# Patient Record
Sex: FEMALE | Race: ASIAN | NOT HISPANIC OR LATINO | Employment: FULL TIME | URBAN - METROPOLITAN AREA
[De-identification: names, ages, dates, MRNs, and addresses within clinical notes are randomized per-mention and may not be internally consistent; named-entity substitution may affect disease eponyms.]

---

## 2017-01-23 ENCOUNTER — GYNECOLOGY VISIT (OUTPATIENT)
Dept: OBGYN | Facility: MEDICAL CENTER | Age: 47
End: 2017-01-23
Payer: COMMERCIAL

## 2017-01-23 ENCOUNTER — HOSPITAL ENCOUNTER (OUTPATIENT)
Facility: MEDICAL CENTER | Age: 47
End: 2017-01-23
Attending: OBSTETRICS & GYNECOLOGY
Payer: COMMERCIAL

## 2017-01-23 VITALS
BODY MASS INDEX: 26.4 KG/M2 | DIASTOLIC BLOOD PRESSURE: 88 MMHG | HEIGHT: 63 IN | SYSTOLIC BLOOD PRESSURE: 142 MMHG | WEIGHT: 149 LBS

## 2017-01-23 DIAGNOSIS — N93.9 ABNORMAL UTERINE BLEEDING (AUB): ICD-10-CM

## 2017-01-23 DIAGNOSIS — D25.1 INTRAMURAL LEIOMYOMA OF UTERUS: ICD-10-CM

## 2017-01-23 LAB — PATHOLOGY CONSULT NOTE: NORMAL

## 2017-01-23 PROCEDURE — 88305 TISSUE EXAM BY PATHOLOGIST: CPT

## 2017-01-23 PROCEDURE — 58100 BIOPSY OF UTERUS LINING: CPT | Performed by: OBSTETRICS & GYNECOLOGY

## 2017-01-23 NOTE — MR AVS SNAPSHOT
"        Ines Soliz   2017 4:00 PM   Gynecology Visit   MRN: [id number]    Department:  [facility]   Dept Phone:  [phone]    Description:  Female : 1970   Provider:  Salena Eisenberg M.D.           Reason for Visit     Procedure Endometrial bipopsy       Allergies as of 2017     No Known Allergies      You were diagnosed with     Abnormal uterine bleeding (AUB)   [[id number]]         Vital Signs     Blood Pressure Height Weight Body Mass Index Last Menstrual Period Smoking Status    142/88 mmHg 1.588 m (5' 2.52\") 67.586 kg (149 lb) 26.80 kg/m2 2017 Never Smoker       Basic Information     Date Of Birth Sex Race Ethnicity Preferred Language    1970 Female  Non- English      Your appointments     2017  9:10 AM   MA SCRN10 with [facility] MG 3   [facility] BREAST [facility] ([address])    [address] [place] [address] 103  [facility] [id number] [phone]           No deodorant, powder, perfume or lotion under the arm or breast area.              Problem List              ICD-10-CM Priority Class Noted - Resolved    Family history of ischemic heart disease Z82.49   Unknown - Present    History of thyroidectomy, subtotal E89.0   Unknown - Present    Uterine fibroid D25.9   2011 - Present    History of iron deficiency anemia Z86.2   2011 - Present    Essential hypertension I10   2012 - Present    Choledocholithiasis with obstruction K80.51   2014 - Present    Hypokalemia E87.6   2016 - Present      Health Maintenance        Date Due Completion Dates    PAP SMEAR 2016 (Prv Comp), 2012    Override on 2013: Previously completed (negative)    MAMMOGRAM 2016, 2014, 2014 (Prv Comp), 2011    Override on 2014: Previously completed (category 1, [place])    IMM DTaP/Tdap/Td Vaccine (2 - Td) 2025            Current Immunizations     Influenza TIV (IM) " 10/13/2014, 9/27/2013, 10/5/2011    Influenza Vaccine Quad Inj (Pf) 10/3/2016  8:15 AM, 10/5/2015  9:25 AM, 10/13/2014    Tdap Vaccine 7/31/2015    Tuberculin Skin Test 3/7/2014  1:40 PM, 2/28/2014  1:45 PM      Below and/or attached are the medications your provider expects you to take. Review all of your home medications and newly ordered medications with your provider and/or pharmacist. Follow medication instructions as directed by your provider and/or pharmacist. Please keep your medication list with you and share with your provider. Update the information when medications are discontinued, doses are changed, or new medications (including over-the-counter products) are added; and carry medication information at all times in the event of emergency situations     Allergies:  No Known Allergies          Medications  Valid as of: January 23, 2017 -  4:38 PM    Generic Name Brand Name Tablet Size Instructions for use    AmLODIPine Besylate (Tab) NORVASC 5 MG Take 1 Tab by mouth every day.        Losartan Potassium (Tab) COZAAR 100 MG Take 1 Tab by mouth every day.        .                 Medicines prescribed today were sent to:     [id number] - [place], [place] - [address]    [address] [place] [address]    Phone: [phone] Fax: [phone]    Open 24 Hours?: No      Medication refill instructions:       If your prescription bottle indicates you have medication refills left, it is not necessary to call your provider’s office. Please contact your pharmacy and they will refill your medication.    If your prescription bottle indicates you do not have any refills left, you may request refills at any time through one of the following ways: The online Bfly system (except Urgent Care), by calling your provider’s office, or by asking your pharmacy to contact your provider’s office with a refill request. Medication refills are processed only during regular business hours and may not be available until the  next business day. Your provider may request additional information or to have a follow-up visit with you prior to refilling your medication.   *Please Note: Medication refills are assigned a new Rx number when refilled electronically. Your pharmacy may indicate that no refills were authorized even though a new prescription for the same medication is available at the pharmacy. Please request the medicine by name with the pharmacy before contacting your provider for a refill.        Your To Do List     Future Labs/Procedures Complete By Expires    PATHOLOGY SPECIMEN  As directed 1/24/2018         Finexkap Access Code: [email or web address]  Expires: 2/22/2017  3:45 PM    Finexkap  A secure, online tool to manage your health information     [employer]’s Finexkap® is a secure, online tool that connects you to your personalized health information from the privacy of your home -- day or night - making it very easy for you to manage your healthcare. Once the activation process is completed, you can even access your medical information using the Finexkap jose, which is available for free in the Apple Jose store or Google Play store.     Finexkap provides the following levels of access (as shown below):   My Chart Features   [facility] Doctor [facility]  Urgent  Care [facility]  Primary Care  Doctor   Email your healthcare team securely and privately 24/7 X X X    Manage appointments: schedule your next appointment; view details of past/upcoming appointments X      Request prescription refills. X      View recent personal medical records, including lab and immunizations X X X X   View health record, including health history, allergies, medications X X X X   Read reports about your outpatient visits, procedures, consult and ER notes X X X X   See your discharge summary, which is a recap of your hospital and/or ER visit that includes your diagnosis, lab results, and care plan. X X       How to register for  MyChart:  1. Go to  [email or web address].  2. Click on the Sign Up Now box, which takes you to the New Member Sign Up page. You will need to provide the following information:  a. Enter your [employer] Access Code exactly as it appears at the top of this page. (You will not need to use this code after you’ve completed the sign-up process. If you do not sign up before the expiration date, you must request a new code.)   b. Enter your date of birth.   c. Enter your home email address.   d. Click Submit, and follow the next screen’s instructions.  3. Create a [employer] ID. This will be your [employer] login ID and cannot be changed, so think of one that is secure and easy to remember.  4. Create a [employer] password. You can change your password at any time.  5. Enter your Password Reset Question and Answer. This can be used at a later time if you forget your password.   6. Enter your e-mail address. This allows you to receive e-mail notifications when new information is available in [employer]  7. Click Sign Up. You can now view your health information.    For assistance activating your [employer] account, call [phone]

## 2017-01-24 NOTE — PROGRESS NOTES
"Chief Complaint   Patient presents with   • Procedure     Endometrial bipopsy      History of present illness:   46 y.o. With history of fibroid uterus and AUB presents for endometrial biopsy  Still with on and off vaginal bleeding, most of the times heavy with blood clots  (+) crampy pelvic pain    Review of systems:  Pertinent positives documented in HPI and all other systems reviewed & are negative    All PMH, PSH, allergies, social history and FH reviewed and updated today:  Past Medical History   Diagnosis Date   • Elevated blood pressure complicating pregnancy, antepartum 2002   • Family history of diabetes mellitus    • Family history of ischemic heart disease    • History of thyroidectomy, subtotal    • Hypertension 4/2012   • Fibroid uterus      relatively stable   • Choledocholithiasis with obstruction 6/2014       Past Surgical History   Procedure Laterality Date   • Thyroidectomy  1997   • Ercp in or  6/15/2014     Performed by Raymon Mena M.D. at SURGERY [facility]   • Genia by laparoscopy  6/16/2014     Performed by Raghu Mata M.D. at SURGERY SAME DAY [facility] ORS   • Cholecystectomy  6/16/2014     Performed by Raghu Mata M.D. at SURGERY SAME DAY [facility] ORS       Allergies: No Known Allergies    Social History     Social History   • Marital Status:      Spouse Name: N/A   • Number of Children: N/A   • Years of Education: N/A     Occupational History   • Not on file.     Social History Main Topics   • Smoking status: Never Smoker    • Smokeless tobacco: Never Used   • Alcohol Use: No   • Drug Use: No   • Sexual Activity: Yes     Other Topics Concern   • Not on file     Social History Narrative       Family History   Problem Relation Age of Onset   • Diabetes Mother    • Heart Disease Father 70     heart attack   • Hypertension Father        Physical exam:  Blood pressure 142/88, height 1.588 m (5' 2.52\"), weight 67.586 kg (149 lb), last menstrual period " 01/19/2017.    General:appears stated age, is in no apparent distress, is well developed and well nourished  Head: normocephalic, non-tender  Abdomen: Bowel sounds positive, nondistended, soft, nontender x4, no rebound or guarding. No organomegaly. No masses.  Female GYN: cervix - closed  Uterus asymmetrically enlarged to 16-18 weeks size, mobile  Skin: No rashes, or ulcers or lesions seen  Psychiatric: Patient shows appropriate affect, is alert and oriented x3, intact judgment and insight.  1. Abnormal uterine bleeding (AUB)  Consent for Surgery / Procedure    POCT Pregnancy    PATHOLOGY SPECIMEN    REFERRAL TO GYNECOLOGY   2. Intramural leiomyoma of uterus  REFERRAL TO GYNECOLOGY   3. Negative UPT  4. Note: Procedure EMB  1. Bimanual exam done. See above  2. Sterile speculum placed with good visualization of the cervix. Cervix showed no lesions   3. Cervix cleansed with betadine x 3.  4. Anterior lip of cervix grasped with single-tooth tenaculum.  5. Uterus sounded to 13 cm  6. Pipelle curet  inserted gently in a normal usual fashion. Passed 2x.   7. Adequate endometrial tissue obtained and sent to pathology.  8. Intruments removed.  9. Bleeding from tenaculum controlled with silver nitrate.  10. Patient tolerated procedure well. No complications encountered.    Await pathology  Would like to have ANGELITO; other options discussed  - but in June or July. Her daughter's vacation from school  Referral placed

## 2017-01-30 ENCOUNTER — TELEPHONE (OUTPATIENT)
Dept: OBGYN | Facility: CLINIC | Age: 47
End: 2017-01-30

## 2017-01-30 NOTE — TELEPHONE ENCOUNTER
----- Message from Salena Eisenberg M.D. sent at 1/27/2017 12:36 PM PST -----  May inform pt of benign results. Gyn appt for POC

## 2017-02-17 ENCOUNTER — HOSPITAL ENCOUNTER (OUTPATIENT)
Dept: RADIOLOGY | Facility: MEDICAL CENTER | Age: 47
End: 2017-02-17
Attending: FAMILY MEDICINE
Payer: COMMERCIAL

## 2017-02-17 DIAGNOSIS — Z13.9 SCREENING: ICD-10-CM

## 2017-02-17 PROCEDURE — 77063 BREAST TOMOSYNTHESIS BI: CPT

## 2017-03-06 ENCOUNTER — OCCUPATIONAL MEDICINE (OUTPATIENT)
Dept: OCCUPATIONAL MEDICINE | Facility: CLINIC | Age: 47
End: 2017-03-06
Payer: COMMERCIAL

## 2017-03-06 VITALS
RESPIRATION RATE: 16 BRPM | TEMPERATURE: 98.2 F | SYSTOLIC BLOOD PRESSURE: 140 MMHG | DIASTOLIC BLOOD PRESSURE: 90 MMHG | OXYGEN SATURATION: 97 % | WEIGHT: 149 LBS | HEART RATE: 87 BPM | HEIGHT: 63 IN | BODY MASS INDEX: 26.4 KG/M2

## 2017-03-06 DIAGNOSIS — S80.02XA CONTUSION OF KNEE, LEFT: ICD-10-CM

## 2017-03-06 DIAGNOSIS — Z02.1 PRE-EMPLOYMENT DRUG SCREENING: ICD-10-CM

## 2017-03-06 LAB
AMP AMPHETAMINE: NORMAL
BAR BARBITURATES: NORMAL
BREATH ALCOHOL COMMENT: NORMAL
BZO BENZODIAZEPINES: NORMAL
COC COCAINE: NORMAL
INT CON NEG: NORMAL
INT CON POS: NORMAL
MDMA ECSTASY: NORMAL
MET METHAMPHETAMINES: NORMAL
MTD METHADONE: NORMAL
OPI OPIATES: NORMAL
OXY OXYCODONE: NORMAL
PCP PHENCYCLIDINE: NORMAL
POC BREATHALIZER: 0 PERCENT (ref 0–0.01)
POC URINE DRUG SCREEN OCDRS: NEGATIVE
THC: NORMAL

## 2017-03-06 PROCEDURE — 99201 PR OFFICE/OUTPT VISIT,NEW,LEVL I: CPT | Performed by: PREVENTIVE MEDICINE

## 2017-03-06 PROCEDURE — 80305 DRUG TEST PRSMV DIR OPT OBS: CPT | Performed by: PREVENTIVE MEDICINE

## 2017-03-06 PROCEDURE — 82075 ASSAY OF BREATH ETHANOL: CPT | Performed by: PREVENTIVE MEDICINE

## 2017-03-06 NOTE — PROGRESS NOTES
"Subjective:      Ines Soliz is a 46 y.o. female who presents with Other      DOI 3/6/2017. Mechanism of injury-twisted and fell on left knee. 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 She reports that while mopping she fell directly on the left knee. She has minimal pain complaints, but does note tenderness on the medial patellar area.     Other        ROS       Objective:     /90 mmHg  Pulse 87  Temp(Src) 36.8 °C (98.2 °F)  Resp 16  Ht 1.588 m (5' 2.5\")  Wt 67.586 kg (149 lb)  BMI 26.80 kg/m2  SpO2 97%     Physical Exam    Appearance: Well-developed, well-nourished.   Mental Status: Pleasant. Cooperative. Appropriate. .   Musculoskeletal: Left knee exam shows mild tenderness medial patella. Minimal crepitus. No effusion.       Assessment/Plan:     1. Contusion of knee, left  OTC ibuprofen  Ice  Regular work  Recheck in 4 days or sooner if needed        "

## 2017-03-06 NOTE — Clinical Note
"EMPLOYEE’S CLAIM FOR COMPENSATION/ REPORT OF INITIAL TREATMENT  FORM C-4    EMPLOYEE’S CLAIM - PROVIDE ALL INFORMATION REQUESTED   First Name  Ines Last Name  Martínez Birthdate                    1970                Sex  female Claim Number   Home Address  [address] Age  46 y.o. Height  1.588 m (5' 2.5\") Weight  67.586 kg (149 lb) [facility]     [facility] Zip  [address] Telephone  [phone] (home)    Mailing Address  [address] [address] [facility] Zip  [address] Primary Language Spoken  English    Insurer  *** Third Party   Workers Choice***   Employee's Occupation (Job Title) When Injury or Occupational Disease Occurred   ***   Employer's Name  [employer] *** Telephone  [phone] ***   Employer Address  [address] [place] [address] *** Located within [facility] *** [facility] *** Zip  [address] ***   Date of Injury  3/6/2017               Hour of Injury  12:30 PM Date Employer Notified  3/6/2017 Last Day of Work after Injury or Occupational Disease  3/6/2017 Supervisor to Whom Injury Reported  Mr Joshua Moreno   Address or Location of Accident (if applicable)  [[address]]   What were you doing at the time of accident? (if applicable)  Mopping the floor, I step on the fork plastic & slip    How did this injury or occupational disease occur? (Be specific an answer in detail. Use additional sheet if necessary)  Knee left pain/ red  I was mopping the floor when I step on plastic fork & I slip on the slip   If you believe that you have an occupational disease, when did you first have knowledge of the disability and it relationship to your employment?  na Witnesses to the Accident  na      Nature of Injury or Occupational Disease  Strain  Part(s) of Body Injured or Affected  Knee (L), ,     I certify that the above is true and correct to the best of my knowledge and that I have provided this information in order to obtain the " benefits of [place]).  I hereby authorize any physician, chiropractor, surgeon, practitioner, or other person, any hospital, including [facility] or [facility] hospital, any medical service organization, any insurance company, or other institution or organization to release to each other, any medical or other information, including benefits paid or payable, pertinent to this injury or disease, except information relative to diagnosis, treatment and/or counseling for AIDS, psychological conditions, alcohol or controlled substances, for which I must give specific authorization.  A Photostat of this authorization shall be as valid as the original.     Date   Place   Employee’s Signature   THIS REPORT MUST BE COMPLETED AND MAILED WITHIN 3 WORKING DAYS OF TREATMENT   Place  [facility]  [facility]   Date  3/6/2017 Diagnosis  No diagnosis found. Is there evidence the injured employee was under the influence of alcohol and/or another controlled substance at the time of accident?   Hour  2:33 PM Description of Injury or Disease  There were no encounter diagnoses. No   Treatment  Left knee contusion-OTC NSAIDs, ice  Have you advised the patient to remain off work five days or more? No   X-Ray Findings      If Yes   From Date  To Date      From information given by the employee, together with medical evidence, can you directly connect this injury or occupational disease as job incurred?  Yes If No Full Duty  Yes Modified Duty      Is additional medical care by a physician indicated?  Yes If Modified Duty, Specify any Limitations / Restrictions      Do you know of any previous injury or disease contributing to this condition or occupational disease?                            No   Date  3/6/2017 Print Doctor’s Name Golden Freedman M.D. I certify the employer’s copy  "of  this form was mailed on:   Address  [address]   [address] Insurer’s Use Only     [facility] Zip  [address]    Provider’s Tax ID Number  [id number]  Telephone  Dept: [phone]        alisha-TIGRE Mcclelland M.D.   e-Signature: Dr. Kevin Mo, Medical Director Degree  MD        ORIGINAL-TREATING PHYSICIAN OR CHIROPRACTOR    PAGE 2-INSURER/TPA    PAGE 3-EMPLOYER    PAGE 4-EMPLOYEE             Form C-4 (rev10/07)              BRIEF DESCRIPTION OF RIGHTS AND BENEFITS  (Pursuant to [place] 616C.050)    Notice of Injury or Occupational Disease (Incident Report Form C-1): If an injury or occupational disease (OD) arises out of and in the  course of employment, you must provide written notice to your employer as soon as practicable, but no later than 7 days after the accident or  OD. Your employer shall maintain a sufficient supply of the required forms.    Claim for Compensation (Form C-4): If medical treatment is sought, the form C-4 is available at the place of initial treatment. A completed  \"Claim for Compensation\" (Form C-4) must be filed within 90 days after an accident or OD. The treating physician or chiropractor must,  within 3 working days after treatment, complete and mail to the employer, the employer's insurer and third-party , the Claim for  Compensation.    Medical Treatment: If you require medical treatment for your on-the-job injury or OD, you may be required to select a physician or  chiropractor from a list provided by your workers’ compensation insurer, if it has contracted with an Organization for Managed Care (MCO) or  Preferred Provider Organization (PPO) or providers of health care. If your employer has not entered into a contract with an MCO or PPO, you  may select a physician or chiropractor from the Panel of Physicians and Chiropractors. Any medical costs related to your industrial injury or  OD will be paid by your insurer.    Temporary Total Disability (TTD): " If your doctor has certified that you are unable to work for a period of at least 5 consecutive days, or 5  cumulative days in a 20-day period, or places restrictions on you that your employer does not accommodate, you may be entitled to TTD  compensation.    Temporary Partial Disability (TPD): If the wage you receive upon reemployment is less than the compensation for TTD to which you are  entitled, the insurer may be required to pay you TPD compensation to make up the difference. TPD can only be paid for a maximum of 24  months.    Permanent Partial Disability (PPD): When your medical condition is stable and there is an indication of a PPD as a result of your injury or  OD, within 30 days, your insurer must arrange for an evaluation by a rating physician or chiropractor to determine the degree of your PPD. The  amount of your PPD award depends on the date of injury, the results of the PPD evaluation and your age and wage.    Permanent Total Disability (PTD): If you are medically certified by a treating physician or chiropractor as permanently and totally disabled  and have been granted a PTD status by your insurer, you are entitled to receive monthly benefits not to exceed 66 2/3% of your average  monthly wage. The amount of your PTD payments is subject to reduction if you previously received a PPD award.    Vocational Rehabilitation Services: You may be eligible for vocational rehabilitation services if you are unable to return to the job due to a  permanent physical impairment or permanent restrictions as a result of your injury or occupational disease.    Transportation and Per Ari Reimbursement: You may be eligible for travel expenses and per ari associated with medical treatment.    Reopening: You may be able to reopen your claim if your condition worsens after claim closure.    Appeal Process: If you disagree with a written determination issued by the insurer or the insurer does not respond to your  request, you may  appeal to the Department of Administration, , by following the instructions contained in your determination letter. You must  appeal the determination within 70 days from the date of the determination letter at [address], [address], [place], [place]  [address], or [address], [place], [place] [address]. If you disagree with the  decision, you may appeal to the  Department of Administration, . You must file your appeal within 30 days from the date of the  decision  letter at [address], [address], [place], [place] [address], or [address], [address], [place], [place] [address]. If you  disagree with a decision of an , you may file a petition for judicial review with the District Court. You must do so within 30  days of the Appeal Officer’s decision. You may be represented by an  at your own expense or you may contact the [facility] for possible  representation.    [place]  for Injured Workers (NAIW): If you disagree with a  decision, you may request that NAIW represent you  without charge at an  Hearing. For information regarding denial of benefits, you may contact the [facility] at: [address], [address], [place] [address], [phone], or [address], [place] [address], [phone]    To File a Complaint with the Division: If you wish to file a complaint with the  of the Division of Industrial Relations (DIR),  please contact the Workers’ Compensation Section, [address], [address], [place], [place] [address], telephone [phone], or  [address], [place], [place] [address], telephone [phone].    For assistance with Workers’ Compensation Issues: you may contact the Office of the Governor Consumer Health Assistance, [address], [address], [place], [place] [address], Toll Free 1-376.217.6630, Web site: http://govcha.[facility].[email or web address].us, E-mail  Ruth [email or web address].                                                                                                                                                                                                                                   __________________________________________________________________                                                                   _________________                Employee Name / Signature                                                                                                                                                       Date                                                                                                                                                                                                     D-2 (rev. 10/07)

## 2017-03-06 NOTE — MR AVS SNAPSHOT
"        Ines Soliz   3/6/2017 3:10 PM   Occupational Medicine   MRN: [id number]    Department:  [facility]   Dept Phone:  [phone]    Description:  Female : 1970   Provider:  Golden Freedman M.D.           Reason for Visit     Other WC New DOI 3/6/17 L Knee RM 25      Allergies as of 3/6/2017     No Known Allergies      You were diagnosed with     Contusion of knee, left   [[id number]]         Vital Signs     Blood Pressure Pulse Temperature Respirations Height Weight    140/90 mmHg 87 36.8 °C (98.2 °F) 16 1.588 m (5' 2.5\") 67.586 kg (149 lb)    Body Mass Index Oxygen Saturation Smoking Status             26.80 kg/m2 97% Never Smoker          Basic Information     Date Of Birth Sex Race Ethnicity Preferred Language    1970 Female  Non- English      Your appointments     Mar 06, 2017  3:10 PM   Workers Compensation (Long) with Golden Freedman M.D.   [facility]    [address]  [address]  [place] [address]   [phone]            2017 10:45 AM   GYN Visit with Salena Eisenberg M.D.   [facility]    [address]  [place] [address]   [phone]              Problem List              ICD-10-CM Priority Class Noted - Resolved    Family history of ischemic heart disease Z82.49   Unknown - Present    History of thyroidectomy, subtotal E89.0   Unknown - Present    Uterine fibroid D25.9   2011 - Present    History of iron deficiency anemia Z86.2   2011 - Present    Essential hypertension I10   2012 - Present    Choledocholithiasis with obstruction K80.51   2014 - Present    Hypokalemia E87.6   2016 - Present      Health Maintenance        Date Due Completion Dates    PAP SMEAR 2016 (Prv Comp), 2012    Override on 2013: Previously completed (negative)    MAMMOGRAM 2018, 2015, 2014, " 1/2/2014 (Prv Comp), 12/19/2011    Override on 1/2/2014: Previously completed (category 1, [place])    IMM DTaP/Tdap/Td Vaccine (2 - Td) 7/31/2025 7/31/2015            Current Immunizations     Influenza TIV (IM) 10/13/2014, 9/27/2013, 10/5/2011    Influenza Vaccine Quad Inj (Pf) 10/3/2016  8:15 AM, 10/5/2015  9:25 AM, 10/13/2014    Tdap Vaccine 7/31/2015    Tuberculin Skin Test 3/7/2014  1:40 PM, 2/28/2014  1:45 PM      Below and/or attached are the medications your provider expects you to take. Review all of your home medications and newly ordered medications with your provider and/or pharmacist. Follow medication instructions as directed by your provider and/or pharmacist. Please keep your medication list with you and share with your provider. Update the information when medications are discontinued, doses are changed, or new medications (including over-the-counter products) are added; and carry medication information at all times in the event of emergency situations     Allergies:  No Known Allergies          Medications  Valid as of: March 06, 2017 -  3:06 PM    Generic Name Brand Name Tablet Size Instructions for use    AmLODIPine Besylate (Tab) NORVASC 5 MG Take 1 Tab by mouth every day.        Losartan Potassium (Tab) COZAAR 100 MG Take 1 Tab by mouth every day.        .                 Medicines prescribed today were sent to:     [id number] - [place], [place] - [address]    [address] [address]    Phone: [phone] Fax: [phone]    Open 24 Hours?: No      Medication refill instructions:       If your prescription bottle indicates you have medication refills left, it is not necessary to call your provider’s office. Please contact your pharmacy and they will refill your medication.    If your prescription bottle indicates you do not have any refills left, you may request refills at any time through one of the following ways: The online My COI system (except Urgent Care), by  calling your provider’s office, or by asking your pharmacy to contact your provider’s office with a refill request. Medication refills are processed only during regular business hours and may not be available until the next business day. Your provider may request additional information or to have a follow-up visit with you prior to refilling your medication.   *Please Note: Medication refills are assigned a new Rx number when refilled electronically. Your pharmacy may indicate that no refills were authorized even though a new prescription for the same medication is available at the pharmacy. Please request the medicine by name with the pharmacy before contacting your provider for a refill.           [employer] Access Code: [email or web address]  Expires: 4/5/2017  1:45 PM    [employer]  A secure, online tool to manage your health information     [employer]’s [employer]® is a secure, online tool that connects you to your personalized health information from the privacy of your home -- day or night - making it very easy for you to manage your healthcare. Once the activation process is completed, you can even access your medical information using the [employer] jose, which is available for free in the Apple Jose store or Google Play store.     [employer] provides the following levels of access (as shown below):   My Chart Features   [facility] Doctor [facility]  Specialists [facility]  Urgent  Care Non-[facility]  Primary Care  Doctor   Email your healthcare team securely and privately 24/7 X X X    Manage appointments: schedule your next appointment; view details of past/upcoming appointments X      Request prescription refills. X      View recent personal medical records, including lab and immunizations X X X X   View health record, including health history, allergies, medications X X X X   Read reports about your outpatient visits, procedures, consult and ER notes X X X X   See your discharge summary, which is a recap of your hospital  and/or ER visit that includes your diagnosis, lab results, and care plan. X X       How to register for [employer]:  1. Go to  [email or web address].  2. Click on the Sign Up Now box, which takes you to the New Member Sign Up page. You will need to provide the following information:  a. Enter your [employer] Access Code exactly as it appears at the top of this page. (You will not need to use this code after you’ve completed the sign-up process. If you do not sign up before the expiration date, you must request a new code.)   b. Enter your date of birth.   c. Enter your home email address.   d. Click Submit, and follow the next screen’s instructions.  3. Create a [employer] ID. This will be your [employer] login ID and cannot be changed, so think of one that is secure and easy to remember.  4. Create a [employer] password. You can change your password at any time.  5. Enter your Password Reset Question and Answer. This can be used at a later time if you forget your password.   6. Enter your e-mail address. This allows you to receive e-mail notifications when new information is available in [employer].  7. Click Sign Up. You can now view your health information.    For assistance activating your [employer] account, call [phone]

## 2017-03-06 NOTE — Clinical Note
[facility]   [address] [place] [address],   [address] [place] [address]  Phone: [phone] - Fax: [phone]        [facility] Progress Report and Disability Certification  Date of Service: 3/6/2017   No Show:  No  Date / Time of Next Visit: 3/10/2017   Claim Information   Patient Name: Ines Soliz  Claim Number:     Employer: [employer] *** Date of Injury: 3/6/2017     Insurer / TPA: Workers Choice *** ID / SSN:     Occupation: EVS Tech *** Diagnosis: There were no encounter diagnoses.    Medical Information   Related to Industrial Injury? Yes ***   Subjective Complaints:  DOI 3/6/2017. Mechanism of injury-twisted and fell on left knee. 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 She reports that while mopping she fell directly on the left knee. She has minimal pain complaints, but does note tenderness on the medial patellar area.   Objective Findings: Appearance: Well-developed, well-nourished.   Mental Status: Pleasant. Cooperative. Appropriate. .   Musculoskeletal: Left knee exam shows mild tenderness medial patella. Minimal crepitus. No effusion.   Pre-Existing Condition(s):     Assessment:   Initial Visit    Status: Additional Care Required  Permanent Disability:No    Plan:      Diagnostics:      Comments:  OTC ibuprofen    Disability Information   Status: Released to Full Duty    From:  3/6/2017  Through: 3/10/2017 Restrictions are:     Physical Restrictions   Sitting:    Standing:    Stooping:    Bending:      Squatting:    Walking:    Climbing:    Pushing:      Pulling:    Other:    Reaching Above Shoulder (L):   Reaching Above Shoulder (R):       Reaching Below Shoulder (L):    Reaching Below Shoulder (R):      Not to exceed Weight Limits   Carrying(hrs):   Weight Limit(lb):   Lifting(hrs):   Weight  Limit(lb):     Comments:      Repetitive Actions   Hands: i.e. Fine Manipulations from Grasping:     Feet: i.e. Operating Foot Controls:      Driving / Operate Machinery:     Physician Name: Golden Freedman M.D. Physician Signature: GOLDEN Galeana M.D. e-Signature: Dr. Kevin Mo, Medical Director   Clinic Name / Location: [address],   [address]  [place] [address] Clinic Phone Number: Dept: [phone]   Appointment Time: 3:10 Pm Visit Start Time: 2:33 PM   Check-In Time:  1:59 Pm Visit Discharge Time:  ***   Original-Treating Physician or Chiropractor    Page 2-Insurer/TPA    Page 3-Employer    Page 4-Employee

## 2017-03-06 NOTE — Clinical Note
"EMPLOYEE’S CLAIM FOR COMPENSATION/ REPORT OF INITIAL TREATMENT  FORM C-4    EMPLOYEE’S CLAIM - PROVIDE ALL INFORMATION REQUESTED   First Name  Ines Last Name  Martínez Birthdate                    1970                Sex  female Claim Number   Home Address  [address] Age  46 y.o. 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 (if applicable)  Mopping the floor, I step on the fork plastic & slip    How did this injury or occupational disease occur? (Be specific an answer in detail. Use additional sheet if necessary)  Knee left pain/ red  I was mopping the floor when I step on plastic fork & I slip on the slip   If you believe that you have an occupational disease, when did you first have knowledge of the disability and it relationship to your employment?  na Witnesses to the Accident  na      Nature of Injury or Occupational Disease  Strain  Part(s) of Body Injured or Affected  Knee (L), ,     I certify that the above is true and correct to the best of my knowledge and that I have provided this information in order to obtain the benefits of [place]’s " [place]).  I hereby authorize any physician, chiropractor, surgeon, practitioner, or other person, any hospital, including [facility] or [facility] hospital, any medical service organization, any insurance company, or other institution or organization to release to each other, any medical or other information, including benefits paid or payable, pertinent to this injury or disease, except information relative to diagnosis, treatment and/or counseling for AIDS, psychological conditions, alcohol or controlled substances, for which I must give specific authorization.  A Photostat of this authorization shall be as valid as the original.     Date   Place   Employee’s Signature   THIS REPORT MUST BE COMPLETED AND MAILED WITHIN 3 WORKING DAYS OF TREATMENT   Place  [facility]  Name of Facility  [facility]   Date  3/6/2017 Diagnosis  (S80.02XA) Contusion of knee, left Is there evidence the injured employee was under the influence of alcohol and/or another controlled substance at the time of accident?   Hour  2:33 PM Description of Injury or Disease  The encounter diagnosis was Contusion of knee, left. No   Treatment  Left knee contusion-OTC NSAIDs, ice  Have you advised the patient to remain off work five days or more? No   X-Ray Findings      If Yes   From Date  To Date      From information given by the employee, together with medical evidence, can you directly connect this injury or occupational disease as job incurred?  Yes If No Full Duty  Yes Modified Duty      Is additional medical care by a physician indicated?  Yes If Modified Duty, Specify any Limitations / Restrictions      Do you know of any previous injury or disease contributing to this condition or occupational disease?                            No   Date  3/6/2017 Print Doctor’s Name Golden Freedman M.D. I certify the  "employer’s copy of  this form was mailed on:   Address  [address]   [address] Insurer’s Use Only     [place] Zip  [address]    Provider’s Tax ID Number  [id number]  Telephone  Dept: [phone]        alisha-TIGRE Mcclelland M.D.   e-Signature: Dr. Kevin Mo, Medical Director Degree  MD        ORIGINAL-TREATING PHYSICIAN OR CHIROPRACTOR    PAGE 2-INSURER/TPA    PAGE 3-EMPLOYER    PAGE 4-EMPLOYEE             Form C-4 (rev10/07)              BRIEF DESCRIPTION OF RIGHTS AND BENEFITS  (Pursuant to [place]RS 616C.050)    Notice of Injury or Occupational Disease (Incident Report Form C-1): If an injury or occupational disease (OD) arises out of and in the  course of employment, you must provide written notice to your employer as soon as practicable, but no later than 7 days after the accident or  OD. Your employer shall maintain a sufficient supply of the required forms.    Claim for Compensation (Form C-4): If medical treatment is sought, the form C-4 is available at the place of initial treatment. A completed  \"Claim for Compensation\" (Form C-4) must be filed within 90 days after an accident or OD. The treating physician or chiropractor must,  within 3 working days after treatment, complete and mail to the employer, the employer's insurer and third-party , the Claim for  Compensation.    Medical Treatment: If you require medical treatment for your on-the-job injury or OD, you may be required to select a physician or  chiropractor from a list provided by your workers’ compensation insurer, if it has contracted with an Organization for Managed Care (MCO) or  Preferred Provider Organization (PPO) or providers of health care. If your employer has not entered into a contract with an MCO or PPO, you  may select a physician or chiropractor from the Panel of Physicians and Chiropractors. Any medical costs related to your industrial injury or  OD will be paid by your insurer.    Temporary Total " Disability (TTD): If your doctor has certified that you are unable to work for a period of at least 5 consecutive days, or 5  cumulative days in a 20-day period, or places restrictions on you that your employer does not accommodate, you may be entitled to TTD  compensation.    Temporary Partial Disability (TPD): If the wage you receive upon reemployment is less than the compensation for TTD to which you are  entitled, the insurer may be required to pay you TPD compensation to make up the difference. TPD can only be paid for a maximum of 24  months.    Permanent Partial Disability (PPD): When your medical condition is stable and there is an indication of a PPD as a result of your injury or  OD, within 30 days, your insurer must arrange for an evaluation by a rating physician or chiropractor to determine the degree of your PPD. The  amount of your PPD award depends on the date of injury, the results of the PPD evaluation and your age and wage.    Permanent Total Disability (PTD): If you are medically certified by a treating physician or chiropractor as permanently and totally disabled  and have been granted a PTD status by your insurer, you are entitled to receive monthly benefits not to exceed 66 2/3% of your average  monthly wage. The amount of your PTD payments is subject to reduction if you previously received a PPD award.    Vocational Rehabilitation Services: You may be eligible for vocational rehabilitation services if you are unable to return to the job due to a  permanent physical impairment or permanent restrictions as a result of your injury or occupational disease.    Transportation and Per Ari Reimbursement: You may be eligible for travel expenses and per ari associated with medical treatment.    Reopening: You may be able to reopen your claim if your condition worsens after claim closure.    Appeal Process: If you disagree with a written determination issued by the insurer or the insurer does not  respond to your request, you may  appeal to the Department of Administration, , by following the instructions contained in your determination letter. You must  appeal the determination within 70 days from the date of the determination letter at [address], [address], [place], [place]  [address], or [address], [place], [place] [address]. If you disagree with the  decision, you may appeal to the  Department of Administration, . You must file your appeal within 30 days from the date of the  decision  letter at [address], [address], [place], [place] [address], or [address], [place], [place] [address]. If you  disagree with a decision of an , you may file a petition for judicial review with the District Court. You must do so within 30  days of the Appeal Officer’s decision. You may be represented by an  at your own expense or you may contact the [facility] for possible  representation.    [place]  for Injured Workers (NAIW): If you disagree with a  decision, you may request that NAIW represent you  without charge at an  Hearing. For information regarding denial of benefits, you may contact the [facility] at: [address], [address], [place] [address], [phone], or [address], [place] [address], [phone]    To File a Complaint with the Division: If you wish to file a complaint with the  of the Division of Industrial Relations (DIR),  please contact the Workers’ Compensation Section, [address], [address], [place], [place] [address], telephone [phone]) [phone], or  [address], [place] [address], telephone [phone]) [phone].    For assistance with Workers’ Compensation Issues: you may contact the Office of the [facility] Consumer Health Assistance, [address], [address], [place], [place] [address], Toll Free 1-414.812.2168, Web site: http://[email or web address].nv., E-mail  Ruth [email or web address].[email or web address].                                                                                                                                                                                                                                   __________________________________________________________________                                                                   _________________                Employee Name / Signature                                                                                                                                                       Date                                                                                                                                                                                                     D-2 (rev. 10/07)

## 2017-03-06 NOTE — Clinical Note
[address],   [address] [place] [address]  Phone: [phone] - Fax: [phone]        [facility] [address] [facility] Progress Report and Disability Certification  Date of Service: 3/6/2017   No Show:  No  Date / Time of Next Visit: 3/10/2017 @2:50 PM    Claim Information   Patient Name: Ines Soliz  Claim Number:     Employer: [employer]  Date of Injury: 3/6/2017     Insurer / TPA: Workers Choice  ID / SSN:     Occupation: EVS Tech  Diagnosis: The encounter diagnosis was Contusion of knee, left.    Medical Information   Related to Industrial Injury? Yes    Subjective Complaints:  DOI 3/6/2017. Mechanism of injury-twisted and fell on left knee. 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 She reports that while mopping she fell directly on the left knee. She has minimal pain complaints, but does note tenderness on the medial patellar area.   Objective Findings: Appearance: Well-developed, well-nourished.   Mental Status: Pleasant. Cooperative. Appropriate. .   Musculoskeletal: Left knee exam shows mild tenderness medial patella. Minimal crepitus. No effusion.   Pre-Existing Condition(s):     Assessment:   Initial Visit    Status: Additional Care Required  Permanent Disability:No    Plan:      Diagnostics:      Comments:  OTC ibuprofen    Disability Information   Status: Released to Full Duty    From:  3/6/2017  Through: 3/10/2017 Restrictions are:     Physical Restrictions   Sitting:    Standing:    Stooping:    Bending:      Squatting:    Walking:    Climbing:    Pushing:      Pulling:    Other:    Reaching Above Shoulder (L):   Reaching Above Shoulder (R):       Reaching Below Shoulder (L):    Reaching Below Shoulder (R):      Not to exceed Weight Limits   Carrying(hrs):   Weight Limit(lb):   Lifting(hrs):   Weight  Limit(lb):     Comments:      Repetitive Actions   Hands: i.e. Fine Manipulations from Grasping:     Feet: i.e. Operating  Foot Controls:     Driving / Operate Machinery:     Physician Name: Golden Freedman M.D. Physician Signature: GOLDEN Galeana M.D. e-Signature: Dr. Kevin Mo, Medical Director   Clinic Name / Location: [address],   [address]  [place] [address] Clinic Phone Number: Dept: [phone]   Appointment Time: 3:10 Pm Visit Start Time: 2:33 PM   Check-In Time:  1:59 Pm Visit Discharge Time:  3:36 PM    Original-Treating Physician or Chiropractor    Page 2-Insurer/TPA    Page 3-Employer    Page 4-Employee

## 2017-03-09 ENCOUNTER — OCCUPATIONAL MEDICINE (OUTPATIENT)
Dept: OCCUPATIONAL MEDICINE | Facility: CLINIC | Age: 47
End: 2017-03-09
Payer: COMMERCIAL

## 2017-03-09 VITALS
BODY MASS INDEX: 27.42 KG/M2 | RESPIRATION RATE: 16 BRPM | OXYGEN SATURATION: 98 % | WEIGHT: 149 LBS | DIASTOLIC BLOOD PRESSURE: 82 MMHG | HEIGHT: 62 IN | TEMPERATURE: 97.7 F | HEART RATE: 77 BPM | SYSTOLIC BLOOD PRESSURE: 110 MMHG

## 2017-03-09 DIAGNOSIS — S80.02XA CONTUSION OF KNEE, LEFT: ICD-10-CM

## 2017-03-09 PROCEDURE — 99213 OFFICE O/P EST LOW 20 MIN: CPT | Performed by: PREVENTIVE MEDICINE

## 2017-03-09 NOTE — Clinical Note
[address],   [address] [place] [address]  Phone: [phone] - Fax: [phone]        [facility] Progress Report and Disability Certification  Date of Service: 3/9/2017   No Show:  No  Date / Time of Next Visit:  MMI   Claim Information   Patient Name: Ines Soliz  Claim Number:     Employer: [employer]  Date of Injury: 3/6/2017     Insurer / TPA: Workers [employer]  ID / SSN:     Occupation: EVS Tech  Diagnosis: The encounter diagnosis was Contusion of knee, left.    Medical Information   Related to Industrial Injury? Yes    Subjective Complaints:  DOI 3/6/2017. Mechanism of injury-twisted and fell on left knee. 46-year-old worker seen for follow-up of left knee contusion. She indicates she was improved. She has resumed regular work   Objective Findings: Appearance: Well-developed, well-nourished.   Mental Status: Pleasant. Cooperative. Appropriate.   Musculoskeletal: Left knee exam shows small ecchymosis anteromedial left knee. Normal gait. Normal squat.   Pre-Existing Condition(s):     Assessment:   Condition Improved    Status: Discharged /  MMI  Permanent Disability:No    Plan:      Diagnostics:      Comments:       Disability Information   Status: Released to Full Duty    From:  3/9/2017  Through:   Restrictions are:     Physical Restrictions   Sitting:    Standing:    Stooping:    Bending:      Squatting:    Walking:    Climbing:    Pushing:      Pulling:    Other:    Reaching Above Shoulder (L):   Reaching Above Shoulder (R):       Reaching Below Shoulder (L):    Reaching Below Shoulder (R):      Not to exceed Weight Limits   Carrying(hrs):   Weight Limit(lb):   Lifting(hrs):   Weight  Limit(lb):     Comments:      Repetitive Actions   Hands: i.e. Fine Manipulations from Grasping:     Feet: i.e. Operating Foot Controls:     Driving / Operate Machinery:     Physician Name: Golden Freedman M.D. Physician Signature:  alisha-TIGRE Mcclelland M.D. e-Signature: Dr. Kevin Mo, Medical Director   Clinic Name / Location: [address],   [address]  [place] [address] Clinic Phone Number: Dept: [phone]   Appointment Time: 2:50 Pm Visit Start Time: 2:44 PM   Check-In Time:  2:36 Pm Visit Discharge Time: 3:11 Pm    Original-Treating Physician or Chiropractor    Page 2-Insurer/TPA    Page 3-Employer    Page 4-Employee

## 2017-03-09 NOTE — Clinical Note
[address],   [address] [place] [address]  Phone: [phone] - Fax: [phone]        [facility] Progress Report and Disability Certification  Date of Service: 3/9/2017   No Show:  No  Date / Time of Next Visit:     Claim Information   Patient Name: Ines Soliz  Claim Number:     Employer: [employer]  Date of Injury: 3/6/2017     Insurer / TPA: Workers [employer]  ID / SSN:     Occupation: EVS Tech  Diagnosis: The encounter diagnosis was Contusion of knee, left.    Medical Information   Related to Industrial Injury? Yes    Subjective Complaints:  DOI 3/6/2017. Mechanism of injury-twisted and fell on left knee. 46-year-old worker seen for follow-up of left knee contusion. She indicates she was improved. She has resumed regular work   Objective Findings: Appearance: Well-developed, well-nourished.   Mental Status: Pleasant. Cooperative. Appropriate.   Musculoskeletal: Left knee exam shows small ecchymosis anteromedial left knee. Normal gait. Normal squat.   Pre-Existing Condition(s):     Assessment:   Condition Improved    Status: Discharged /  MMI  Permanent Disability:No    Plan:      Diagnostics:      Comments:       Disability Information   Status: Released to Full Duty    From:  3/9/2017  Through:   Restrictions are:     Physical Restrictions   Sitting:    Standing:    Stooping:    Bending:      Squatting:    Walking:    Climbing:    Pushing:      Pulling:    Other:    Reaching Above Shoulder (L):   Reaching Above Shoulder (R):       Reaching Below Shoulder (L):    Reaching Below Shoulder (R):      Not to exceed Weight Limits   Carrying(hrs):   Weight Limit(lb):   Lifting(hrs):   Weight  Limit(lb):     Comments:      Repetitive Actions   Hands: i.e. Fine Manipulations from Grasping:     Feet: i.e. Operating Foot Controls:     Driving / Operate Machinery:     Physician Name: Golden Freedman M.D. Physician Signature: Kervin  TIGRE SANTIAGO M.D. e-Signature: Dr. Kevin Mo, Medical Director   Clinic Name / Location: [address],   [address]  [place] [address] Clinic Phone Number: Dept: [phone]   Appointment Time: 2:50 Pm Visit Start Time: 2:44 PM   Check-In Time:  2:36 Pm Visit Discharge Time: 3:11 Pm    Original-Treating Physician or Chiropractor    Page 2-Insurer/TPA    Page 3-Employer    Page 4-Employee

## 2017-03-09 NOTE — MR AVS SNAPSHOT
"        Ines Soliz   3/9/2017 2:50 PM   Occupational Medicine   MRN: [id number]    Department:  [facility]   Dept Phone:  [phone]    Description:  Female : 1970   Provider:  Golden Freedman M.D.           Reason for Visit     Follow-Up WC DOI 3/6/17 Knee is feeling better      Allergies as of 3/9/2017     No Known Allergies      You were diagnosed with     Contusion of knee, left   [[id number]]         Vital Signs     Blood Pressure Pulse Temperature Respirations Height Weight    110/82 mmHg 77 36.5 °C (97.7 °F) 16 1.575 m (5' 2\") 67.586 kg (149 lb)    Body Mass Index Oxygen Saturation Smoking Status             27.25 kg/m2 98% Never Smoker          Basic Information     Date Of Birth Sex Race Ethnicity Preferred Language    1970 Female  Non- English      Your appointments     2017 10:45 AM   GYN Visit with Salena Eisenberg M.D.   [facility]    [address]  [facility] [address] [phone]              Problem List              ICD-10-CM Priority Class Noted - Resolved    Family history of ischemic heart disease Z82.49   Unknown - Present    History of thyroidectomy, subtotal E89.0   Unknown - Present    Uterine fibroid D25.9   2011 - Present    History of iron deficiency anemia Z86.2   2011 - Present    Essential hypertension I10   2012 - Present    Choledocholithiasis with obstruction K80.51   2014 - Present    Hypokalemia E87.6   2016 - Present      Health Maintenance        Date Due Completion Dates    PAP SMEAR 2016 (Prv Comp), 2012    Override on 2013: Previously completed (negative)    MAMMOGRAM 2018, 2015, 2014, 2014 (Prv Comp), 2011    Override on 2014: Previously completed (category 1, [place]ines)    IMM DTaP/Tdap/Td Vaccine (2 - Td) 2025            Current Immunizations   "    Influenza TIV (IM) 10/13/2014, 9/27/2013, 10/5/2011    Influenza Vaccine Quad Inj (Pf) 10/3/2016  8:15 AM, 10/5/2015  9:25 AM, 10/13/2014    Tdap Vaccine 7/31/2015    Tuberculin Skin Test 3/7/2014  1:40 PM, 2/28/2014  1:45 PM      Below and/or attached are the medications your provider expects you to take. Review all of your home medications and newly ordered medications with your provider and/or pharmacist. Follow medication instructions as directed by your provider and/or pharmacist. Please keep your medication list with you and share with your provider. Update the information when medications are discontinued, doses are changed, or new medications (including over-the-counter products) are added; and carry medication information at all times in the event of emergency situations     Allergies:  No Known Allergies          Medications  Valid as of: March 09, 2017 -  3:11 PM    Generic Name Brand Name Tablet Size Instructions for use    AmLODIPine Besylate (Tab) NORVASC 5 MG Take 1 Tab by mouth every day.        Losartan Potassium (Tab) COZAAR 100 MG Take 1 Tab by mouth every day.        .                 Medicines prescribed today were sent to:     [facility]ARMACY #[id number] - [place], [place] - [address]    [address] [place] [address]    Phone: [phone] Fax: [phone]    Open 24 Hours?: No      Medication refill instructions:       If your prescription bottle indicates you have medication refills left, it is not necessary to call your provider’s office. Please contact your pharmacy and they will refill your medication.    If your prescription bottle indicates you do not have any refills left, you may request refills at any time through one of the following ways: The online [employer] system (except Urgent Care), by calling your provider’s office, or by asking your pharmacy to contact your provider’s office with a refill request. Medication refills are processed only during regular business hours and may not be  available until the next business day. Your provider may request additional information or to have a follow-up visit with you prior to refilling your medication.   *Please Note: Medication refills are assigned a new Rx number when refilled electronically. Your pharmacy may indicate that no refills were authorized even though a new prescription for the same medication is available at the pharmacy. Please request the medicine by name with the pharmacy before contacting your provider for a refill.           [employer] Access Code: [email or web address]  Expires: 4/5/2017  1:45 PM    [employer]  A secure, online tool to manage your health information     [employer]’s [employer]ph[employer]® is a secure, online tool that connects you to your personalized health information from the privacy of your home -- day or night - making it very easy for you to manage your healthcare. Once the activation process is completed, you can even access your medical information using the nk[employer]ph[employer] jose, which is available for free in the Apple Jose store or Google Play store.     [employer] provides the following levels of access (as shown below):   My Chart Features   [facility] Primary Care Doctor [facility]  Specialists [facility]  Urgent  Care Non-[facility]  Primary Care  Doctor   Email your healthcare team securely and privately 24/7 X X X    Manage appointments: schedule your next appointment; view details of past/upcoming appointments X      Request prescription refills. X      View recent personal medical records, including lab and immunizations X X X X   View health record, including health history, allergies, medications X X X X   Read reports about your outpatient visits, procedures, consult and ER notes X X X X   See your discharge summary, which is a recap of your hospital and/or ER visit that includes your diagnosis, lab results, and care plan. X X       How to register for [employer]:  1. Go to  [email or web address].  2. Click on the Sign Up Now box, which takes  you to the New Member Sign Up page. You will need to provide the following information:  a. Enter your [employer] Access Code exactly as it appears at the top of this page. (You will not need to use this code after you’ve completed the sign-up process. If you do not sign up before the expiration date, you must request a new code.)   b. Enter your date of birth.   c. Enter your home email address.   d. Click Submit, and follow the next screen’s instructions.  3. Create a [employer] ID. This will be your [employer] login ID and cannot be changed, so think of one that is secure and easy to remember.  4. Create a [employer] password. You can change your password at any time.  5. Enter your Password Reset Question and Answer. This can be used at a later time if you forget your password.   6. Enter your e-mail address. This allows you to receive e-mail notifications when new information is available in [employer].  7. Click Sign Up. You can now view your health information.    For assistance activating your [employer] account, call [phone]

## 2017-03-09 NOTE — Clinical Note
[address],   [address] [place] [address]  Phone: [phone] - Fax: [phone]        [facility] Progress Report and Disability Certification  Date of Service: 3/9/2017   No Show:  No  Date / Time of Next Visit:   MMI    Claim Information   Patient Name: Ines Soliz  Claim Number:     Employer: [employer]  Date of Injury: 3/6/2017     Insurer / TPA: Workers [employer]  ID / SSN:     Occupation: EVS Tech  Diagnosis: The encounter diagnosis was Contusion of knee, left.    Medical Information   Related to Industrial Injury? Yes    Subjective Complaints:  DOI 3/6/2017. Mechanism of injury-twisted and fell on left knee. 46-year-old worker seen for follow-up of left knee contusion. She indicates she was improved. She has resumed regular work   Objective Findings: Appearance: Well-developed, well-nourished.   Mental Status: Pleasant. Cooperative. Appropriate.   Musculoskeletal: Left knee exam shows small ecchymosis anteromedial left knee. Normal gait. Normal squat.   Pre-Existing Condition(s):     Assessment:   Condition Improved    Status: Discharged /  MMI  Permanent Disability:No    Plan:      Diagnostics:      Comments:       Disability Information   Status: Released to Full Duty    From:  3/9/2017  Through:   Restrictions are:     Physical Restrictions   Sitting:    Standing:    Stooping:    Bending:      Squatting:    Walking:    Climbing:    Pushing:      Pulling:    Other:    Reaching Above Shoulder (L):   Reaching Above Shoulder (R):       Reaching Below Shoulder (L):    Reaching Below Shoulder (R):      Not to exceed Weight Limits   Carrying(hrs):   Weight Limit(lb):   Lifting(hrs):   Weight  Limit(lb):     Comments:      Repetitive Actions   Hands: i.e. Fine Manipulations from Grasping:     Feet: i.e. Operating Foot Controls:     Driving / Operate Machinery:     Physician Name: Golden Freedman M.D. Physician Signature:  TIGRE Galeana M.D. e-Signature: Dr. Kevin Mo, Medical Director   Clinic Name / Location: [address],   [address]  [place] [address] Clinic Phone Number: Dept: [phone]   Appointment Time: 2:50 Pm Visit Start Time: 2:44 PM   Check-In Time:  2:36 Pm Visit Discharge Time:  @3:03PM   Original-Treating Physician or Chiropractor    Page 2-Insurer/TPA    Page 3-Employer    Page 4-Employee

## 2017-03-09 NOTE — PROGRESS NOTES
"Subjective:      Ines Soliz is a 46 y.o. female who presents with Follow-Up      DOI 3/6/2017. Mechanism of injury-twisted and fell on left knee. 46-year-old worker seen for follow-up of left knee contusion. She indicates she was improved. She has resumed regular work     HPI    ROS       Objective:     /82 mmHg  Pulse 77  Temp(Src) 36.5 °C (97.7 °F)  Resp 16  Ht 1.575 m (5' 2\")  Wt 67.586 kg (149 lb)  BMI 27.25 kg/m2  SpO2 98%     Physical Exam    Appearance: Well-developed, well-nourished.   Mental Status: Pleasant. Cooperative. Appropriate.   Musculoskeletal: Left knee exam shows small ecchymosis anteromedial left knee. Normal gait. Normal squat.       Assessment/Plan:     1. Contusion of knee, left  Condition improved  Regular work  No impairment  Released from care        "

## 2017-04-14 ENCOUNTER — GYNECOLOGY VISIT (OUTPATIENT)
Dept: OBGYN | Facility: MEDICAL CENTER | Age: 47
End: 2017-04-14
Payer: COMMERCIAL

## 2017-04-14 VITALS
BODY MASS INDEX: 27.23 KG/M2 | DIASTOLIC BLOOD PRESSURE: 82 MMHG | WEIGHT: 148 LBS | HEIGHT: 62 IN | SYSTOLIC BLOOD PRESSURE: 124 MMHG

## 2017-04-14 DIAGNOSIS — D25.1 INTRAMURAL LEIOMYOMA OF UTERUS: ICD-10-CM

## 2017-04-14 DIAGNOSIS — N93.9 ABNORMAL UTERINE BLEEDING (AUB): ICD-10-CM

## 2017-04-14 PROCEDURE — 99213 OFFICE O/P EST LOW 20 MIN: CPT | Performed by: OBSTETRICS & GYNECOLOGY

## 2017-04-14 NOTE — PROGRESS NOTES
"Chief Complaint   Patient presents with   • Follow-Up       History of present illness:   46 y.o. With known fibroid uterus and AUB presents for ff-up to discuss EMB results and POC  EMB - benign  She would like to have the hysterectomy after June 2017, when her child if off school  Irregular vaginal bleeding now, not heavy  Occasional pelvic cramping    Review of systems:  Pertinent positives documented in HPI and all other systems reviewed & are negative    All PMH, PSH, allergies, social history and FH reviewed and updated today:  Past Medical History   Diagnosis Date   • Elevated blood pressure complicating pregnancy, antepartum 2002   • Family history of diabetes mellitus    • Family history of ischemic heart disease    • History of thyroidectomy, subtotal    • Hypertension 4/2012   • Fibroid uterus      relatively stable   • Choledocholithiasis with obstruction 6/2014       Past Surgical History   Procedure Laterality Date   • Thyroidectomy  1997   • Ercp in or  6/15/2014     Performed by Raymon Mena M.D. at SUR[facility]ERY [facility]   • Genia by laparoscopy  6/16/2014     Performed by Raghu Mata M.D. at SURGERY SAME DAY [facility]   • Cholecystectomy  6/16/2014     Performed by Raghu Mata M.D. at SURGERY SAME DAY [facility]       Allergies: No Known Allergies    Social History     Social History   • Marital Status:      Spouse Name: N/A   • Number of Children: N/A   • Years of Education: N/A     Occupational History   • Not on file.     Social History Main Topics   • Smoking status: Never Smoker    • Smokeless tobacco: Never Used   • Alcohol Use: No   • Drug Use: No   • Sexual Activity: Yes     Other Topics Concern   • Not on file     Social History Narrative       Family History   Problem Relation Age of Onset   • Diabetes Mother    • Heart Disease Father 70     heart attack   • Hypertension Father        Physical exam:  Blood pressure 124/82, height 1.575 m (5' 2\"), weight 67.132 kg " (148 lb).    General:appears stated age, is in no apparent distress, is well developed and well nourished  Skin: No rashes, or ulcers or lesions seen  Psychiatric: Patient shows appropriate affect, is alert and oriented x3, intact judgment and insight.      1. Intramural leiomyoma of uterus     2. Abnormal uterine bleeding (AUB)     3. EMB results discussed. Pt would still want to go with ANG[facility]O-[facility], but would like to have the procedure after June 1st.  4. Procedure discussed with her. All questions addressed.    Spent  15 minutes in face-to-face patient contact in which greater than 50% of that visit was spent in counseling/coordination of care

## 2017-04-14 NOTE — MR AVS SNAPSHOT
"Ines Soliz   2017 10:45 AM   Gynecology Visit   MRN: [id number]    [facility]   Dept Phone:  [phone]    Description:  Female : 1970   Provider:  Salena Eisenberg M.D.           Reason for Visit     Follow-Up           Allergies as of 2017     No Known Allergies      Vital Signs     Blood Pressure Height Weight Body Mass Index Smoking Status       124/82 mmHg 1.575 m (5' 2\") 67.132 kg (148 lb) 27.06 kg/m2 Never Smoker        Basic Information     Date Of Birth Sex Race Ethnicity Preferred Language    1970 Female  Non- English      Problem List              ICD-10-CM Priority Class Noted - Resolved    Family history of ischemic heart disease Z82.49   Unknown - Present    History of thyroidectomy, subtotal E89.0   Unknown - Present    Uterine fibroid D25.9   2011 - Present    History of iron deficiency anemia Z86.2   2011 - Present    Essential hypertension I10   2012 - Present    Choledocholithiasis with obstruction K80.51   2014 - Present    Hypokalemia E87.6   2016 - Present      Health Maintenance        Date Due Completion Dates    PAP SMEAR 2016 (Prv Comp), 2012    Override on 2013: Previously completed (negative)    MAMMOGRAM 2018, 2015, 2014, 2014 (Prv Comp), 2011    Override on 2014: Previously completed (category 1, [place])    IMM DTaP/Tdap/Td Vaccine (2 - Td) 2025            Current Immunizations     Hepatitis B Vaccine Non-Recombivax (Ped/Adol) 2012, 2011, 2011    Influenza TIV (IM) 10/13/2014, 2013, 10/5/2011    Influenza Vaccine Quad Inj (Pf) 10/3/2016  8:15 AM, 10/5/2015  9:25 AM, 10/13/2014    Tdap Vaccine 2015    Tuberculin Skin Test 3/7/2014  1:40 PM, 2014  1:45 PM      Below and/or attached are the medications your provider expects you to take. Review all of your home " medications and newly ordered medications with your provider and/or pharmacist. Follow medication instructions as directed by your provider and/or pharmacist. Please keep your medication list with you and share with your provider. Update the information when medications are discontinued, doses are changed, or new medications (including over-the-counter products) are added; and carry medication information at all times in the event of emergency situations     Allergies:  No Known Allergies          Medications  Valid as of: April 14, 2017 - 11:30 AM    Generic Name Brand Name Tablet Size Instructions for use    AmLODIPine Besylate (Tab) NORVASC 5 MG Take 1 Tab by mouth every day.        Losartan Potassium (Tab) COZAAR 100 MG Take 1 Tab by mouth every day.        .                 Medicines prescribed today were sent to:     [facility]/PHARMACY #[id number] - [place], [place] - [address]    [address] [place] [address]    Phone: [phone] Fax: [phone]    Open 24 Hours?: No      Medication refill instructions:       If your prescription bottle indicates you have medication refills left, it is not necessary to call your provider’s office. Please contact your pharmacy and they will refill your medication.    If your prescription bottle indicates you do not have any refills left, you may request refills at any time through one of the following ways: The online [employer] system (except Urgent Care), by calling your provider’s office, or by asking your pharmacy to contact your provider’s office with a refill request. Medication refills are processed only during regular business hours and may not be available until the next business day. Your provider may request additional information or to have a follow-up visit with you prior to refilling your medication.   *Please Note: Medication refills are assigned a new Rx number when refilled electronically. Your pharmacy may indicate that no refills were authorized even though a new  prescription for the same medication is available at the pharmacy. Please request the medicine by name with the pharmacy before contacting your provider for a refill.           MyChart Access Code: Activation code not generated  Current MyChart Status: Active

## 2017-05-20 DIAGNOSIS — I10 ESSENTIAL HYPERTENSION: ICD-10-CM

## 2017-05-21 RX ORDER — LOSARTAN POTASSIUM 100 MG/1
TABLET ORAL
Qty: 90 TAB | Refills: 2 | Status: SHIPPED | OUTPATIENT
Start: 2017-05-21 | End: 2018-02-11 | Stop reason: SDUPTHER

## 2017-05-21 RX ORDER — AMLODIPINE BESYLATE 5 MG/1
TABLET ORAL
Qty: 90 TAB | Refills: 2 | Status: SHIPPED | OUTPATIENT
Start: 2017-05-21 | End: 2018-02-11 | Stop reason: SDUPTHER

## 2017-06-30 ENCOUNTER — GYNECOLOGY VISIT (OUTPATIENT)
Dept: OBGYN | Facility: MEDICAL CENTER | Age: 47
End: 2017-06-30
Payer: COMMERCIAL

## 2017-06-30 VITALS
SYSTOLIC BLOOD PRESSURE: 120 MMHG | HEIGHT: 62 IN | WEIGHT: 152 LBS | DIASTOLIC BLOOD PRESSURE: 80 MMHG | BODY MASS INDEX: 27.97 KG/M2

## 2017-06-30 DIAGNOSIS — N93.9 ABNORMAL UTERINE BLEEDING (AUB): ICD-10-CM

## 2017-06-30 DIAGNOSIS — D25.9 UTERINE LEIOMYOMA, UNSPECIFIED LOCATION: ICD-10-CM

## 2017-06-30 PROCEDURE — 99213 OFFICE O/P EST LOW 20 MIN: CPT | Performed by: OBSTETRICS & GYNECOLOGY

## 2017-06-30 NOTE — MR AVS SNAPSHOT
"        Ines Soliz   2017 1:45 PM   Gynecology Visit   MRN: [id number]    Department:  [facility]   Dept Phone:  [phone]    Description:  Female : 1970   Provider:  Salena Eisenberg M.D.           Reason for Visit     Pre-op Exam consultation for surgical managment      Allergies as of 2017     No Known Allergies      You were diagnosed with     Abnormal uterine bleeding (AUB)   [[id number]]       Uterine leiomyoma, unspecified location   [[id number]]         Vital Signs     Blood Pressure Height Weight Body Mass Index Smoking Status       120/80 mmHg 1.575 m (5' 2.01\") 68.947 kg (152 lb) 27.79 kg/m2 Never Smoker        Basic Information     Date Of Birth Sex Race Ethnicity Preferred Language    1970 Female  Non- English      Your appointments     2017 10:15 AM   Scheduled Pre Admission with PREADMIT 1   [facility] TESTS [facility] [place] (--)    [address] [address] [phone]            2017 11:15 AM   Pre / Post GYN Surgical Visit with Salena Eisenberg M.D.   [facility]    [address]  [place] [address] [phone]              Problem List              ICD-10-CM Priority Class Noted - Resolved    Family history of ischemic heart disease Z82.49   Unknown - Present    History of thyroidectomy, subtotal E89.0   Unknown - Present    Uterine fibroid D25.9   2011 - Present    History of iron deficiency anemia Z86.2   2011 - Present    Essential hypertension I10   2012 - Present    Choledocholithiasis with obstruction K80.51   2014 - Present    Hypokalemia E87.6   2016 - Present      Health Maintenance        Date Due Completion Dates    PAP SMEAR 2016 (Prv Comp), 2012    Override on 2013: Previously completed (negative)    MAMMOGRAM 2018, 2015, 2014, 2014 (Prv Comp), 2011   "    Override on 1/2/2014: Previously completed (category 1, [place])    IMM DTaP/Tdap/Td Vaccine (2 - Td) 7/31/2025 7/31/2015            Current Immunizations     Hepatitis B Vaccine Non-Recombivax (Ped/Adol) 1/17/2012, 8/16/2011, 7/12/2011    Influenza TIV (IM) 10/13/2014, 9/27/2013, 10/5/2011    Influenza Vaccine Quad Inj (Pf) 10/3/2016  8:15 AM, 10/5/2015  9:25 AM, 10/13/2014    Tdap Vaccine 7/31/2015    Tuberculin Skin Test 3/7/2014  1:40 PM, 2/28/2014  1:45 PM      Below and/or attached are the medications your provider expects you to take. Review all of your home medications and newly ordered medications with your provider and/or pharmacist. Follow medication instructions as directed by your provider and/or pharmacist. Please keep your medication list with you and share with your provider. Update the information when medications are discontinued, doses are changed, or new medications (including over-the-counter products) are added; and carry medication information at all times in the event of emergency situations     Allergies:  No Known Allergies          Medications  Valid as of: June 30, 2017 -  2:12 PM    Generic Name Brand Name Tablet Size Instructions for use    AmLODIPine Besylate (Tab) NORVASC 5 MG TAKE 1 TAB BY MOUTH EVERY DAY.        Losartan Potassium (Tab) COZAAR 100 MG TAKE 1 TAB BY MOUTH EVERY DAY.        .                 Medicines prescribed today were sent to:     [id number] - [place], [place] - [address] [facility]    [address] [place] [address]    Phone: [phone] Fax: [phone]    Open 24 Hours?: No      Medication refill instructions:       If your prescription bottle indicates you have medication refills left, it is not necessary to call your provider’s office. Please contact your pharmacy and they will refill your medication.    If your prescription bottle indicates you do not have any refills left, you may request refills at any time through one of the following ways: The  online [employer] system (except Urgent Care), by calling your provider’s office, or by asking your pharmacy to contact your provider’s office with a refill request. Medication refills are processed only during regular business hours and may not be available until the next business day. Your provider may request additional information or to have a follow-up visit with you prior to refilling your medication.   *Please Note: Medication refills are assigned a new Rx number when refilled electronically. Your pharmacy may indicate that no refills were authorized even though a new prescription for the same medication is available at the pharmacy. Please request the medicine by name with the pharmacy before contacting your provider for a refill.           [employer] Access Code: Activation code not generated  Current [employer] Status: Active

## 2017-07-01 NOTE — PROGRESS NOTES
"Chief Complaint   Patient presents with   • Pre-op Exam     consultation for surgical managment     PREOP         History of present illness:   46 y.o. presents to discuss hysterectomy procedure scheduled for 7/11/2017  History of abnormal menses, most recent periods lasted 2 weeks  (+) pelvic pain  Pt opts to retain her cervix    Review of systems:  Pertinent positives documented in HPI and all other systems reviewed & are negative    All PMH, PSH, allergies, social history and FH reviewed and updated today:  Past Medical History   Diagnosis Date   • Elevated blood pressure complicating pregnancy, antepartum 2002   • Family history of diabetes mellitus    • Family history of ischemic heart disease    • History of thyroidectomy, subtotal    • Hypertension 4/2012   • Fibroid uterus      relatively stable   • Choledocholithiasis with obstruction 6/2014       Past Surgical History   Procedure Laterality Date   • Thyroidectomy  1997   • Ercp in or  6/15/2014     Performed by Raymon Mena M.D. at SURGERY [facility]   • Genia by laparoscopy  6/16/2014     Performed by Raghu Mata M.D. at SURGERY SAME DAY [facility] ORS   • Cholecystectomy  6/16/2014     Performed by Raghu Mata M.D. at SURGERY SAME DAY [facility] ORS       Allergies: No Known Allergies    Social History     Social History   • Marital Status:      Spouse Name: N/A   • Number of Children: N/A   • Years of Education: N/A     Occupational History   • Not on file.     Social History Main Topics   • Smoking status: Never Smoker    • Smokeless tobacco: Never Used   • Alcohol Use: No   • Drug Use: No   • Sexual Activity: Yes     Other Topics Concern   • Not on file     Social History Narrative       Family History   Problem Relation Age of Onset   • Diabetes Mother    • Heart Disease Father 70     heart attack   • Hypertension Father        Physical exam:  Blood pressure 120/80, height 1.575 m (5' 2.01\"), weight 68.947 kg (152 " lb).    General:appears stated age, is in no apparent distress, is well developed and well nourished  Head: normocephalic, non-tender  Neck: neck is supple  CV : regular rate and rhythm, no peripheral edema  Lungs: Normal respiratory effort. Clear to auscultation bilaterally  Abdomen: Bowel sounds positive, nondistended, soft, nontender x4, no rebound or guarding. No organomegaly. No masses.  Female GYN: deferred   Skin: No rashes, or ulcers or lesions seen  Psychiatric: Patient shows appropriate affect, is alert and oriented x3, intact judgment and insight.      1. Abnormal uterine bleeding (AUB)  Consent for Surgery / Procedure   2. Uterine leiomyoma, unspecified location  Consent for Surgery / Procedure   3. Complete discussion regarding the proposed procedure was conducted both today and throughout the entire micheal-operative period. The procedure: ABDOMINAL SUPRACERVICAL HYSTERECTOMY WITH BILATERAL SALPINGECTOMY                                                           _______________  Specific to Abdominal surgery ( open), patient is made aware of risk of injury to the bowel, bladder, nerves and the ureter ( urine conduit). Complications to these organs are rare, but do occur and indeed it was discussed that the specific pathology of the patient that necessitated surgery may make the risk greater. Patient additionally is aware of the risk of subsequent adhesions or small bowel obstruction from open abdominal surgery.    Specific to Hysterectomy, patient is aware that although alternative methods exist for uterine preservation, that both patient and surgeon, agree that the benefits to hysterectomy outweigh the risks and that alternative methods are not indicated or beneficial for her specific pathology ( problem) Patient is also aware that removal of the ovaries may if not already in menopause, speed the onset of menopause, and that specific discussions regarding the risk/benefit of ovarian preservation vs  removal and the use of hormone replacement have been discussed and all questions answered.   PRE-OP INSTRUCTIONS GIVEN

## 2017-07-07 DIAGNOSIS — Z01.812 PRE-PROCEDURAL LABORATORY EXAMINATION: ICD-10-CM

## 2017-07-07 DIAGNOSIS — Z01.810 PRE-OPERATIVE CARDIOVASCULAR EXAMINATION: ICD-10-CM

## 2017-07-07 LAB
ANION GAP SERPL CALC-SCNC: 11 MMOL/L (ref 0–11.9)
BUN SERPL-MCNC: 12 MG/DL (ref 8–22)
CALCIUM SERPL-MCNC: 9.7 MG/DL (ref 8.5–10.5)
CHLORIDE SERPL-SCNC: 102 MMOL/L (ref 96–112)
CO2 SERPL-SCNC: 26 MMOL/L (ref 20–33)
CREAT SERPL-MCNC: 0.56 MG/DL (ref 0.5–1.4)
EKG IMPRESSION: NORMAL
ERYTHROCYTE [DISTWIDTH] IN BLOOD BY AUTOMATED COUNT: 39 FL (ref 35.9–50)
GFR SERPL CREATININE-BSD FRML MDRD: >60 ML/MIN/1.73 M 2
GLUCOSE SERPL-MCNC: 102 MG/DL (ref 65–99)
HCT VFR BLD AUTO: 45.6 % (ref 37–47)
HGB BLD-MCNC: 15 G/DL (ref 12–16)
MCH RBC QN AUTO: 28 PG (ref 27–33)
MCHC RBC AUTO-ENTMCNC: 32.9 G/DL (ref 33.6–35)
MCV RBC AUTO: 85.2 FL (ref 81.4–97.8)
PLATELET # BLD AUTO: 288 K/UL (ref 164–446)
PMV BLD AUTO: 10.2 FL (ref 9–12.9)
POTASSIUM SERPL-SCNC: 3.9 MMOL/L (ref 3.6–5.5)
RBC # BLD AUTO: 5.35 M/UL (ref 4.2–5.4)
SODIUM SERPL-SCNC: 139 MMOL/L (ref 135–145)
WBC # BLD AUTO: 5.7 K/UL (ref 4.8–10.8)

## 2017-07-07 PROCEDURE — 93005 ELECTROCARDIOGRAM TRACING: CPT

## 2017-07-07 PROCEDURE — 85027 COMPLETE CBC AUTOMATED: CPT

## 2017-07-07 PROCEDURE — 80048 BASIC METABOLIC PNL TOTAL CA: CPT

## 2017-07-07 PROCEDURE — 93010 ELECTROCARDIOGRAM REPORT: CPT | Performed by: INTERNAL MEDICINE

## 2017-07-07 PROCEDURE — 36415 COLL VENOUS BLD VENIPUNCTURE: CPT

## 2017-07-11 ENCOUNTER — HOSPITAL ENCOUNTER (INPATIENT)
Facility: MEDICAL CENTER | Age: 47
LOS: 3 days | DRG: 743 | End: 2017-07-14
Attending: OBSTETRICS & GYNECOLOGY | Admitting: OBSTETRICS & GYNECOLOGY
Payer: COMMERCIAL

## 2017-07-11 PROBLEM — N93.9 VAGINAL HEMORRHAGE: Status: ACTIVE | Noted: 2017-07-11

## 2017-07-11 LAB
B-HCG FREE SERPL-ACNC: <5 MIU/ML
IHCGL IHCGL: NEGATIVE MIU/ML

## 2017-07-11 PROCEDURE — A6404 STERILE GAUZE > 48 SQ IN: HCPCS | Performed by: OBSTETRICS & GYNECOLOGY

## 2017-07-11 PROCEDURE — 160036 HCHG PACU - EA ADDL 30 MINS PHASE I: Performed by: OBSTETRICS & GYNECOLOGY

## 2017-07-11 PROCEDURE — 500122 HCHG BOVIE, BLADE: Performed by: OBSTETRICS & GYNECOLOGY

## 2017-07-11 PROCEDURE — A4311 CATHETER W/O BAG 2-WAY LATEX: HCPCS | Performed by: OBSTETRICS & GYNECOLOGY

## 2017-07-11 PROCEDURE — 0UT90ZZ RESECTION OF UTERUS, OPEN APPROACH: ICD-10-PCS | Performed by: OBSTETRICS & GYNECOLOGY

## 2017-07-11 PROCEDURE — 0UT70ZZ RESECTION OF BILATERAL FALLOPIAN TUBES, OPEN APPROACH: ICD-10-PCS | Performed by: OBSTETRICS & GYNECOLOGY

## 2017-07-11 PROCEDURE — 160041 HCHG SURGERY MINUTES - EA ADDL 1 MIN LEVEL 4: Performed by: OBSTETRICS & GYNECOLOGY

## 2017-07-11 PROCEDURE — 700101 HCHG RX REV CODE 250

## 2017-07-11 PROCEDURE — 84702 CHORIONIC GONADOTROPIN TEST: CPT

## 2017-07-11 PROCEDURE — 502240 HCHG MISC OR SUPPLY RC 0272: Performed by: OBSTETRICS & GYNECOLOGY

## 2017-07-11 PROCEDURE — 160029 HCHG SURGERY MINUTES - 1ST 30 MINS LEVEL 4: Performed by: OBSTETRICS & GYNECOLOGY

## 2017-07-11 PROCEDURE — 88307 TISSUE EXAM BY PATHOLOGIST: CPT

## 2017-07-11 PROCEDURE — 770001 HCHG ROOM/CARE - MED/SURG/GYN PRIV*

## 2017-07-11 PROCEDURE — 700111 HCHG RX REV CODE 636 W/ 250 OVERRIDE (IP)

## 2017-07-11 PROCEDURE — A9270 NON-COVERED ITEM OR SERVICE: HCPCS | Performed by: OBSTETRICS & GYNECOLOGY

## 2017-07-11 PROCEDURE — 700111 HCHG RX REV CODE 636 W/ 250 OVERRIDE (IP): Performed by: OBSTETRICS & GYNECOLOGY

## 2017-07-11 PROCEDURE — 503053 HCHG HEMOSTAT POWDER-3GRAM: Performed by: OBSTETRICS & GYNECOLOGY

## 2017-07-11 PROCEDURE — 700105 HCHG RX REV CODE 258: Performed by: ANESTHESIOLOGY

## 2017-07-11 PROCEDURE — 160002 HCHG RECOVERY MINUTES (STAT): Performed by: OBSTETRICS & GYNECOLOGY

## 2017-07-11 PROCEDURE — 700102 HCHG RX REV CODE 250 W/ 637 OVERRIDE(OP): Performed by: OBSTETRICS & GYNECOLOGY

## 2017-07-11 PROCEDURE — A6402 STERILE GAUZE <= 16 SQ IN: HCPCS | Performed by: OBSTETRICS & GYNECOLOGY

## 2017-07-11 PROCEDURE — 160048 HCHG OR STATISTICAL LEVEL 1-5: Performed by: OBSTETRICS & GYNECOLOGY

## 2017-07-11 PROCEDURE — 160009 HCHG ANES TIME/MIN: Performed by: OBSTETRICS & GYNECOLOGY

## 2017-07-11 PROCEDURE — 160035 HCHG PACU - 1ST 60 MINS PHASE I: Performed by: OBSTETRICS & GYNECOLOGY

## 2017-07-11 PROCEDURE — 501838 HCHG SUTURE GENERAL: Performed by: OBSTETRICS & GYNECOLOGY

## 2017-07-11 RX ORDER — ONDANSETRON 2 MG/ML
4 INJECTION INTRAMUSCULAR; INTRAVENOUS EVERY 6 HOURS PRN
Status: DISCONTINUED | OUTPATIENT
Start: 2017-07-11 | End: 2017-07-14 | Stop reason: HOSPADM

## 2017-07-11 RX ORDER — LIDOCAINE HYDROCHLORIDE 10 MG/ML
0.5 INJECTION, SOLUTION INFILTRATION; PERINEURAL
Status: COMPLETED | OUTPATIENT
Start: 2017-07-11 | End: 2017-07-11

## 2017-07-11 RX ORDER — BUPIVACAINE HYDROCHLORIDE 7.5 MG/ML
INJECTION, SOLUTION EPIDURAL; RETROBULBAR
Status: DISPENSED
Start: 2017-07-11 | End: 2017-07-11

## 2017-07-11 RX ORDER — ONDANSETRON 2 MG/ML
INJECTION INTRAMUSCULAR; INTRAVENOUS
Status: COMPLETED
Start: 2017-07-11 | End: 2017-07-11

## 2017-07-11 RX ORDER — MIDAZOLAM HYDROCHLORIDE 1 MG/ML
INJECTION INTRAMUSCULAR; INTRAVENOUS
Status: DISPENSED
Start: 2017-07-11 | End: 2017-07-11

## 2017-07-11 RX ORDER — IBUPROFEN 600 MG/1
600 TABLET ORAL EVERY 6 HOURS PRN
Status: DISCONTINUED | OUTPATIENT
Start: 2017-07-11 | End: 2017-07-14 | Stop reason: HOSPADM

## 2017-07-11 RX ORDER — LIDOCAINE HYDROCHLORIDE 40 MG/ML
SOLUTION TOPICAL
Status: DISPENSED
Start: 2017-07-11 | End: 2017-07-11

## 2017-07-11 RX ORDER — LIDOCAINE HYDROCHLORIDE 10 MG/ML
INJECTION, SOLUTION INFILTRATION; PERINEURAL
Status: COMPLETED
Start: 2017-07-11 | End: 2017-07-11

## 2017-07-11 RX ORDER — SODIUM CHLORIDE, SODIUM LACTATE, POTASSIUM CHLORIDE, CALCIUM CHLORIDE 600; 310; 30; 20 MG/100ML; MG/100ML; MG/100ML; MG/100ML
INJECTION, SOLUTION INTRAVENOUS CONTINUOUS
Status: DISCONTINUED | OUTPATIENT
Start: 2017-07-11 | End: 2017-07-14 | Stop reason: HOSPADM

## 2017-07-11 RX ORDER — LIDOCAINE AND PRILOCAINE 25; 25 MG/G; MG/G
1 CREAM TOPICAL
Status: COMPLETED | OUTPATIENT
Start: 2017-07-11 | End: 2017-07-11

## 2017-07-11 RX ORDER — OXYCODONE HYDROCHLORIDE AND ACETAMINOPHEN 5; 325 MG/1; MG/1
1 TABLET ORAL EVERY 4 HOURS PRN
Status: DISCONTINUED | OUTPATIENT
Start: 2017-07-11 | End: 2017-07-14 | Stop reason: HOSPADM

## 2017-07-11 RX ORDER — BUPIVACAINE HYDROCHLORIDE 2.5 MG/ML
INJECTION, SOLUTION EPIDURAL; INFILTRATION; INTRACAUDAL
Status: DISPENSED
Start: 2017-07-11 | End: 2017-07-11

## 2017-07-11 RX ADMIN — OXYCODONE HYDROCHLORIDE AND ACETAMINOPHEN 1 TABLET: 5; 325 TABLET ORAL at 18:02

## 2017-07-11 RX ADMIN — SODIUM CHLORIDE, POTASSIUM CHLORIDE, SODIUM LACTATE AND CALCIUM CHLORIDE 1000 ML: 600; 310; 30; 20 INJECTION, SOLUTION INTRAVENOUS at 09:25

## 2017-07-11 RX ADMIN — OXYCODONE HYDROCHLORIDE AND ACETAMINOPHEN 1 TABLET: 5; 325 TABLET ORAL at 13:14

## 2017-07-11 RX ADMIN — ONDANSETRON 4 MG: 2 INJECTION INTRAMUSCULAR; INTRAVENOUS at 12:05

## 2017-07-11 RX ADMIN — SODIUM CHLORIDE, POTASSIUM CHLORIDE, SODIUM LACTATE AND CALCIUM CHLORIDE: 600; 310; 30; 20 INJECTION, SOLUTION INTRAVENOUS at 13:07

## 2017-07-11 RX ADMIN — ONDANSETRON 4 MG: 2 INJECTION INTRAMUSCULAR; INTRAVENOUS at 18:02

## 2017-07-11 RX ADMIN — LIDOCAINE HYDROCHLORIDE 0.5 ML: 10 INJECTION, SOLUTION INFILTRATION; PERINEURAL at 08:45

## 2017-07-11 RX ADMIN — OXYCODONE HYDROCHLORIDE AND ACETAMINOPHEN 1 TABLET: 5; 325 TABLET ORAL at 23:10

## 2017-07-11 RX ADMIN — FENTANYL CITRATE 50 MCG: 50 INJECTION, SOLUTION INTRAMUSCULAR; INTRAVENOUS at 11:10

## 2017-07-11 ASSESSMENT — PAIN SCALES - GENERAL
PAINLEVEL_OUTOF10: 0
PAINLEVEL_OUTOF10: 0
PAINLEVEL_OUTOF10: 8
PAINLEVEL_OUTOF10: 8
PAINLEVEL_OUTOF10: 5
PAINLEVEL_OUTOF10: 10

## 2017-07-11 ASSESSMENT — LIFESTYLE VARIABLES
DO YOU DRINK ALCOHOL: NO
EVER_SMOKED: NEVER

## 2017-07-11 NOTE — IP AVS SNAPSHOT
" Home Care Instructions                                                                                                                  Name:Ines Soliz  Medical Record Number:[id number]  CSN: [id number]    YOB: 1970   Age: 46 y.o.  Sex: female  HT:1.6 m (5' 3\") WT: 66.3 kg (146 lb 2.6 oz)          Admit Date: 7/11/2017     Discharge Date:   Today's Date: 7/13/2017  Attending Doctor:  Salena Mojica*                  Allergies:  Review of patient's allergies indicates no known allergies.            Discharge Instructions       Discharge Instructions    Discharged to home by car with relative. Discharged via wheelchair, hospital escort: Yes.  Special equipment needed: Not Applicable    Be sure to schedule a follow-up appointment with your primary care doctor or any specialists as instructed.     Discharge Plan:   Diet Plan: Discussed  Activity Level: Discussed  Confirmed Follow up Appointment: Patient to Call and Schedule Appointment  Confirmed Symptoms Management: Discussed  Medication Reconciliation Updated: Yes  Influenza Vaccine Indication: Not indicated: Previously immunized this influenza season and > 8 years of age    I understand that a diet low in cholesterol, fat, and sodium is recommended for good health. Unless I have been given specific instructions below for another diet, I accept this instruction as my diet prescription.   Other diet: Regular    Special Instructions: None    · Is patient discharged on Warfarin / Coumadin?   No     · Is patient Post Blood Transfusion?  No    Depression / Suicide Risk    As you are discharged from this [facility] facility, it is important to learn how to keep safe from harming yourself.    Recognize the warning signs:  · Abrupt changes in personality, positive or negative- including increase in energy   · Giving away possessions  · Change in eating patterns- significant weight changes-  positive or negative  · Change in sleeping patterns- " unable to sleep or sleeping all the time   · Unwillingness or inability to communicate  · Depression  · Unusual sadness, discouragement and loneliness  · Talk of wanting to die  · Neglect of personal appearance   · Rebelliousness- reckless behavior  · Withdrawal from people/activities they love  · Confusion- inability to concentrate     If you or a loved one observes any of these behaviors or has concerns about self-harm, here's what you can do:  · Talk about it- your feelings and reasons for harming yourself  · Remove any means that you might use to hurt yourself (examples: pills, rope, extension cords, firearm)  · Get professional help from the community (Mental Health, Substance Abuse, psychological counseling)  · Do not be alone:Call your Safe Contact- someone whom you trust who will be there for you.  · Call your local CRISIS HOTLINE 979-9159 or 934-483-5068  · Call your local Children's Mobile Crisis Response Team [place] (765) 598-2613 or www.Orgdot  · Call the toll free National Suicide Prevention Hotlines   · National Suicide Prevention Lifeline 590-929-YLHN (2579)  · National Hope Line Network 800-SUICIDE (767-8053)    Abdominal Hysterectomy  Abdominal hysterectomy is a surgical procedure to remove your womb (uterus). Your uterus is the muscular organ that contains a developing baby. This surgery is done for many reasons. You may need an abdominal hysterectomy if you have cancer, growths (tumors), long-term pain, or bleeding. You may also have this procedure if your uterus has slipped down into your vagina (uterine prolapse).  Depending on why you need an abdominal hysterectomy, you may also have other reproductive organs removed. These could include the part of your vagina that connects with your uterus (cervix), the organs that make eggs (ovaries), and the tubes that connect the ovaries to the uterus (fallopian tubes).  LET YOUR HEALTH CARE PROVIDER KNOW ABOUT:   · Any allergies you  have.  · All medicines you are taking, including vitamins, herbs, eye drops, creams, and over-the-counter medicines.  · Previous problems you or members of your family have had with the use of anesthetics.  · Any blood disorders you have.  · Previous surgeries you have had.  · Medical conditions you have.  RISKS AND COMPLICATIONS  Generally, this is a safe procedure. However, as with any procedure, problems can occur. Infection is the most common problem after an abdominal hysterectomy. Other possible problems include:  · Bleeding.  · Formation of blood clots that may break free and travel to your lungs.  · Injury to other organs near your uterus.  · Nerve injury causing nerve pain.  · Decreased interest in sex or pain during sexual intercourse.  BEFORE THE PROCEDURE  · Abdominal hysterectomy is a major surgical procedure. It can affect the way you feel about yourself. Talk to your health care provider about the physical and emotional changes hysterectomy may cause.  · You may need to have blood work and X-rays done before surgery.  · Quit smoking if you smoke. Ask your health care provider for help if you are struggling to quit.  · Stop taking medicines that thin your blood as directed by your health care provider.  · You may be instructed to take antibiotic medicines or laxatives before surgery.  · Do not eat or drink anything for 6-8 hours before surgery.  · Take your regular medicines with a small sip of water.  · Bathe or shower the night or morning before surgery.  PROCEDURE  · Abdominal hysterectomy is done in the operating room at the hospital.  · In most cases, you will be given a medicine that makes you go to sleep (general anesthetic).  · The surgeon will make a cut (incision) through the skin in your lower belly.  · The incision may be about 5-7 inches long. It may go side-to-side or up-and-down.  · The surgeon will move aside the body tissue that covers your uterus. The surgeon will then carefully take  out your uterus along with any of your other reproductive organs that need to be removed.  · Bleeding will be controlled with clamps or sutures.  · The surgeon will close your incision with sutures or metal clips.  AFTER THE PROCEDURE  · You will have some pain immediately after the procedure.  · You will be given pain medicine in the recovery room.  · You will be taken to your hospital room when you have recovered from the anesthesia.  · You may need to stay in the hospital for 2-5 days.  · You will be given instructions for recovery at home.     This information is not intended to replace advice given to you by your health care provider. Make sure you discuss any questions you have with your health care provider.     Document Released: 12/23/2014 Document Reviewed: 12/23/2014  [employer]italclip Interactive Patient Education ©2016 [employer]    Abdominal Hysterectomy, Care After  These instructions give you information on caring for yourself after your procedure. Your doctor may also give you more specific instructions. Call your doctor if you have any problems or questions after your procedure.   HOME CARE  It takes 4-6 weeks to recover from this surgery. Follow all of your doctor's instructions.   · Only take medicines as told by your doctor.  · Change your bandage as told by your doctor.  · Return to your doctor to have your stitches taken out.  · Take showers for 2-3 weeks. Ask your doctor when it is okay to shower.  · Do not douche, use tampons, or have sex (intercourse) for at least 6 weeks or as told.  · Follow your doctor's advice about exercise, lifting objects, driving, and general activities.  · Get plenty of rest and sleep.  · Do not lift anything heavier than a gallon of milk (about 10 pounds [4.5 kilograms]) for the first month after surgery.  · Get back to your normal diet as told by your doctor.  · Do not drink alcohol until your doctor says it is okay.  · Take a medicine to help you poop (laxative) as  told by your doctor.  · Eating foods high in fiber may help you poop. Eat a lot of raw fruits and vegetables, whole grains, and beans.  · Drink enough fluids to keep your pee (urine) clear or pale yellow.  · Have someone help you at home for 1-2 weeks after your surgery.  · Keep follow-up doctor visits as told.  GET HELP IF:  · You have chills or fever.  · You have puffiness, redness, or pain in area of the cut (incision).  · You have yellowish-white fluid (pus) coming from the cut.  · You have a bad smell coming from the cut or bandage.  · Your cut pulls apart.  · You feel dizzy or light-headed.  · You have pain or bleeding when you pee.  · You keep having watery poop (diarrhea).  · You keep feeling sick to your stomach (nauseous) or keep throwing up (vomiting).  · You have fluid (discharge) coming from your vagina.  · You have a rash.  · You have a reaction to your medicine.  · You need stronger pain medicine.  GET HELP RIGHT AWAY IF:   · You have a fever and your symptoms suddenly get worse.  · You have bad belly (abdominal) pain.  · You have chest pain.  · You are short of breath.  · You pass out (faint).  · You have pain, puffiness, or redness of your leg.  · You bleed a lot from your vagina and notice clumps of tissue (clots).  MAKE SURE YOU:   · Understand these instructions.  · Will watch your condition.  · Will get help right away if you are not doing well or get worse.     This information is not intended to replace advice given to you by your health care provider. Make sure you discuss any questions you have with your health care provider.     Document Released: 09/26/2009 Document Revised: 12/23/2014 Document Reviewed: 10/10/2014  [employer] Interactive Patient Education ©2016 [employer]      Your appointments     Jul 25, 2017 11:15 AM   Pre / Post GYN Surgical Visit with Salena Eisenberg M.D.   [facility])    [address]  [facility]  [address] [phone]              Follow-up Information     1. Follow up with Salena Eisenberg M.D. On 7/25/2017.    Specialty:  OB/Gyn    Why:  For wound re-check    Contact information    [address]  [place] [address] [phone]           Discharge Medication Instructions:    Below are the medications your physician expects you to take upon discharge:    Review all your home medications and newly ordered medications with your doctor and/or pharmacist. Follow medication instructions as directed by your doctor and/or pharmacist.    Please keep your medication list with you and share with your physician.               Medication List      START taking these medications        Instructions    Morning Afternoon Evening Bedtime    ibuprofen 600 MG Tabs   Commonly known as:  MOTRIN        Take 1 Tab by mouth every 6 hours as needed for Mild Pain.   Dose:  600 mg                        ondansetron 4 MG Tabs tablet   Commonly known as:  ZOFRAN        Take 1 Tab by mouth every four hours as needed for Nausea/Vomiting (ODT).   Dose:  4 mg                        oxycodone-acetaminophen 5-325 MG Tabs   Last time this was given:  1 Tab on 7/13/2017  7:32 AM   Commonly known as:  PERCOCET        Take 1 Tab by mouth every 6 hours as needed for Moderate Pain.   Dose:  1 Tab                          CONTINUE taking these medications        Instructions    Morning Afternoon Evening Bedtime    amlodipine 5 MG Tabs   Commonly known as:  NORVASC        TAKE 1 TAB BY MOUTH EVERY DAY.                        losartan 100 MG Tabs   Commonly known as:  COZAAR        TAKE 1 TAB BY MOUTH EVERY DAY.                             Where to Get Your Medications      Information about where to get these medications is not yet available     ! Ask your nurse or doctor about these medications    - ibuprofen 600 MG Tabs  - ondansetron 4 MG Tabs tablet  - oxycodone-acetaminophen 5-325 MG Tabs            Instructions              Diet / Nutrition:    Follow any diet instructions given to you by your doctor or the dietician, including how much salt (sodium) you are allowed each day.    If you are overweight, talk to your doctor about a weight reduction plan.    Activity:    Remain physically active following your doctor's instructions about exercise and activity.    Rest often.     Any time you become even a little tired or short of breath, SIT DOWN and rest.    Worsening Symptoms:    Report any of the following signs and symptoms to the doctor's office immediately:    *Pain of jaw, arm, or neck  *Chest pain not relieved by medication                               *Dizziness or loss of consciousness  *Difficulty breathing even when at rest   *More tired than usual                                       *Bleeding drainage or swelling of surgical site  *Swelling of feet, ankles, legs or stomach                 *Fever (>100ºF)  *Pink or blood tinged sputum  *Weight gain (3lbs/day or 5lbs /week)           *Shock from internal defibrillator (if applicable)  *Palpitations or irregular heartbeats                *Cool and/or numb extremities    Stroke Awareness    Common Risk Factors for Stroke include:    Age  Atrial Fibrillation  Carotid Artery Stenosis  Diabetes Mellitus  Excessive alcohol consumption  High blood pressure  Overweight   Physical inactivity  Smoking    Warning signs and symptoms of a stroke include:    *Sudden numbness or weakness of the face, arm or leg (especially on one side of the body).  *Sudden confusion, trouble speaking or understanding.  *Sudden trouble seeing in one or both eyes.  *Sudden trouble walking, dizziness, loss of balance or coordination.Sudden severe headache with no known cause.    It is very important to get treatment quickly when a stroke occurs. If you experience any of the above warning signs, call 911 immediately.                   Disclaimer         Quit Smoking / Tobacco Use:    I understand the use  of any tobacco products increases my chance of suffering from future heart disease or stroke and could cause other illnesses which may shorten my life. Quitting the use of tobacco products is the single most important thing I can do to improve my health. For further information on smoking / tobacco cessation call a Toll Free Quit Line at 1-648.935.5449 (*National Cancer [place]) or 1-956.184.4641 (American Lung Association) or you can access the web based program at www.lungusa.org.    [place] Tobacco Users Help Line:  (847) 189-8368       Toll Free: 1-246.542.9724  Quit Tobacco Program [facility] (621)842-0263    Crisis Hotline:    [place] Crisis Hotline:  9-836-YCLIVSU or 1-657.368.5212    [place] Crisis Hotline:    1-779.503.1941 or 654-701-8517    Discharge Survey:   Thank you for choosing [facility]. We hope we did everything we could to make your hospital stay a pleasant one. You may be receiving a phone survey and we would appreciate your time and participation in answering the questions. Your input is very valuable to us in our efforts to improve our service to our patients and their families.        My signature on this form indicates that:    1. I have reviewed and understand the above information.  2. My questions regarding this information have been answered to my satisfaction.  3. I have formulated a plan with my discharge nurse to obtain my prescribed medications for home.                  Disclaimer         __________________________________                     __________       ________                       Patient Signature                                                 Date                    Time

## 2017-07-11 NOTE — PROGRESS NOTES
Pt vomited and this RN has attempted X2 to change pt's linens. Pt states she does not want to get up. Pt chooses to lay in vomit. Instructed pt to let us know when she would like to be cleaned up.

## 2017-07-11 NOTE — IP AVS SNAPSHOT
MilePoint Access Code: Activation code not generated  Current MilePoint Status: Active    MeeGeniushart  A secure, online tool to manage your health information     [employer]’s MilePoint® is a secure, online tool that connects you to your personalized health information from the privacy of your home -- day or night - making it very easy for you to manage your healthcare. Once the activation process is completed, you can even access your medical information using the MilePoint jose, which is available for free in the Apple Jose store or Google Play store.     MilePoint provides the following levels of access (as shown below):   My Chart Features   [facility] Primary Care Doctor [facility]  Specialists [facility]  Urgent  Care Non-[facility]  Primary Care  Doctor   Email your healthcare team securely and privately 24/7 X X X X   Manage appointments: schedule your next appointment; view details of past/upcoming appointments X      Request prescription refills. X      View recent personal medical records, including lab and immunizations X X X X   View health record, including health history, allergies, medications X X X X   Read reports about your outpatient visits, procedures, consult and ER notes X X X X   See your discharge summary, which is a recap of your hospital and/or ER visit that includes your diagnosis, lab results, and care plan. X X       How to register for MilePoint:  1. Go to  [email or web address].  2. Click on the Sign Up Now box, which takes you to the New Member Sign Up page. You will need to provide the following information:  a. Enter your MilePoint Access Code exactly as it appears at the top of this page. (You will not need to use this code after you’ve completed the sign-up process. If you do not sign up before the expiration date, you must request a new code.)   b. Enter your date of birth.   c. Enter your home email address.   d. Click Submit, and follow the next screen’s instructions.  3. Create a MilePoint ID. This will  be your Digifeye login ID and cannot be changed, so think of one that is secure and easy to remember.  4. Create a Digifeye password. You can change your password at any time.  5. Enter your Password Reset Question and Answer. This can be used at a later time if you forget your password.   6. Enter your e-mail address. This allows you to receive e-mail notifications when new information is available in Digifeye.  7. Click Sign Up. You can now view your health information.    For assistance activating your Digifeye account, call [phone]

## 2017-07-11 NOTE — IP AVS SNAPSHOT
7/13/2017    Ines Soliz  [address] [place] [address]    Dear Ines:    [facility] wants to ensure your discharge home is safe and you or your loved ones have had all of your questions answered regarding your care after you leave the hospital.    Below is a list of resources and contact information should you have any questions regarding your hospital stay, follow-up instructions, or active medical symptoms.    Questions or Concerns Regarding… Contact   Medical Questions Related to Your Discharge  (7 days a week, 8am-5pm) Contact a Nurse Care Coordinator   [phone]   Medical Questions Not Related to Your Discharge  (24 hours a day / 7 days a week)  Contact the Nurse Health Line   [phone]    Medications or Discharge Instructions Refer to your discharge packet   or contact your [facility] Primary Care Provider   [phone]   Follow-up Appointment(s) Schedule your appointment via 3DVista   or contact Scheduling [phone]   Billing Review your statement via 3DVista  or contact Billing [phone]   Medical Records Review your records via 3DVista   or contact Medical Records [phone]     You may receive a telephone call within two days of discharge. This call is to make certain you understand your discharge instructions and have the opportunity to have any questions answered. You can also easily access your medical information, test results and upcoming appointments via the 3DVista free online health management tool. You can learn more and sign up at Maxymiser/3DVista. For assistance setting up your 3DVista account, please call [phone].    Once again, we want to ensure your discharge home is safe and that you have a clear understanding of any next steps in your care. If you have any questions or concerns, please do not hesitate to contact us, we are here for you. Thank you for choosing [facility] for your healthcare needs.    Sincerely,    Your [facility] Healthcare Team

## 2017-07-11 NOTE — H&P
HISTORY AND PHYSICAL    PRE-OP DIAGNOSIS:   1. FIBROID UTERUS   2. ABNORMAL UTERINE BLEEDING   3. CHRONIC PELVIC PAIN  PROPOSED SURGICAL PROCEDURE:   1.  ABDOMINAL SUPRACERVICAL  HYSTERECTOMY WITH BILATERAL SALPINGECTOMY   2. ANY OTHER INDICATED PROCEDURE    History of present illness:   46 y.o. With known fibroid uterus and abnormal uterine bleeding presents for proposed surgery  (+) heavy periods x more than a year. Endometrial biopsy - benign  (+) pelvic pains on and off  Fibroids has increased in size past year    Review of systems:  Pertinent positives documented in HPI and all other systems reviewed & are negative    All PMH, PSH, allergies, social history and FH reviewed and updated today:  Past Medical History   Diagnosis Date   • Elevated blood pressure complicating pregnancy, antepartum 2002   • Family history of diabetes mellitus    • Family history of ischemic heart disease    • History of thyroidectomy, subtotal    • Hypertension 4/2012   • Fibroid uterus      relatively stable   • Choledocholithiasis with obstruction 6/2014   • Dental disorder      upper plate       Past Surgical History   Procedure Laterality Date   • Thyroidectomy  1997   • Ercp in or  6/15/2014     Performed by Raymon Mena M.D. at SURGERY [facility] ORS   • Genia by laparoscopy  6/16/2014     Performed by Raghu Mata M.D. at SURGERY [facility] ORS   • Cholecystectomy  6/16/2014     Performed by Raghu Mata M.D. at SURGERY [facility] ORS       Allergies: No Known Allergies    Social History     Social History   • Marital Status:      Spouse Name: N/A   • Number of Children: N/A   • Years of Education: N/A     Occupational History   • Not on file.     Social History Main Topics   • Smoking status: Never Smoker    • Smokeless tobacco: Never Used   • Alcohol Use: No   • Drug Use: No   • Sexual Activity: Yes     Other Topics Concern   • Not on file     Social History Narrative       Family  "History   Problem Relation Age of Onset   • Diabetes Mother    • Heart Disease Father 70     heart attack   • Hypertension Father        Physical exam:  Height 1.6 m (5' 3\"), weight 68.8 kg (151 lb 10.8 oz), last menstrual period 06/24/2017.    General:appears stated age, is in no apparent distress, is well developed and well nourished  Head: normocephalic, non-tender  Neck: neck is supple  CV : regular rate and rhythm, no peripheral edema  Lungs: Normal respiratory effort. Clear to auscultation bilaterally  Abdomen: Bowel sounds positive, nondistended, soft, nontender x4, no rebound or guarding. No organomegaly. No masses.  Female GYN: deferred  Skin: No rashes, or ulcers or lesions seen  Psychiatric: Patient shows appropriate affect, is alert and oriented x3, intact judgment and insight.    ASSESSMENT AND PLAN:   1. SUPRACERVICAL HYSTERECTOMY WITH BILATERAL SALPINGECTOMY   2. ANY OTHER INDICATED PROCEDURE  Complete discussion regarding the proposed procedure was conducted     Specific to Abdominal surgery ( open), patient is made aware of risk of injury to the bowel, bladder, nerves and the ureter ( urine conduit). Complications to these organs are rare, but do occur and indeed it was discussed that the specific pathology of the patient that necessitated surgery may make the risk greater. Patient additionally is aware of the risk of subsequent adhesions or small bowel obstruction from open abdominal surgery.    Specific to supracervical hysterectomy, patient is aware that although alternative methods exist for uterine preservation, that both patient and surgeon, agree that the benefits to hysterectomy outweigh the risks and that alternative methods are not indicated or beneficial for her specific pathology ( problem) Patient is also aware that removal of the ovaries may if not already in menopause, speed the onset of menopause, and that specific discussions regarding the risk/benefit of ovarian preservation vs " removal and the use of hormone replacement have been discussed and all questions answered.

## 2017-07-11 NOTE — OR SURGEON
Immediate Post-Operative Note      PreOp Diagnosis: 1. Fibroid Uterus                                2. Abnormal Uterine Bleeding                                3. Chronic Pelvic Pain    PostOp Diagnosis: same    Procedure(s):  ABDOMINAL HYSTERECTOMY TOTAL - SUPRACERVICAL  - Wound Class: Clean  BILATERAL SALPINGECTOMY - Wound Class: Clean    Surgeon(s):  Salena Quiñones M.D.    Anesthesiologist/Type of Anesthesia:  Anesthesiologist: Rodney Campbell M.D./General    Surgical Staff:  Circulator: Dominga Bryant R.N.  Scrub Person: Pablito Lui    Specimen: uterus, bilateral fallopian tubes    Estimated Blood Loss: 50 cc    Findings: uterus is enlarged and bulky to 18 week size                  Bilateral fallopian tubes and ovaries normal    Complications: normal    7/11/2017 10:48 AM Salena Arriaza MD

## 2017-07-11 NOTE — OR NURSING
1056 Pt arrived from OR, rec'd report from . VSS. No s/s of resp distress. Vertical incision below umbilicus, tegaderm in place, CDI. Peripad in place, CDI. Pt arousable, but sleeping between care. Pt denies pain. Pt has equal strength in both feet, strong flexion.   1110 Pt c/o of pain 8/10, see  MAR  1120 Pt sleeping, appears comfortable  1140 Report called to [facility] RN, room still cleaning, pt remains asleep,   1205: pt nauseated, medicated with iv zofran, request placed for transport,   1215: pt back to sleep, family updated on room number and will meet patient up there,   1220: transport here, pt taken to room, nausea better, belongings with pt.

## 2017-07-11 NOTE — PROGRESS NOTES
Pt arrived to unit from PACU. VSS. Pt denies chest pain/shortness of breath. Pt denies numbness/tingling. Pt c/o nausea, recently medicated with PRN in PACU. Pt c/o pain, medicated with PRN per MAR. Continue to monitor pain level and provide intervention as needed. Dressing CDI to abdomen. Admit profile completed. Pt oriented to room. Plan of care reviewed. Bed in lowest position. Call light within reach. Continue to monitor.

## 2017-07-11 NOTE — IP AVS SNAPSHOT
" <p align=\"LEFT\"><IMG SRC=\"//[email or web address]\" alt=\"Image\" WIDTH=\"50%\" HEIGHT=\"200\" BORDER=\"\"></p>                   Name:Ines Soliz  Medical Record Number:[id number]  CSN: [id number]    YOB: 1970   Age: 46 y.o.  Sex: female  HT:1.6 m (5' 3\") WT: 66.3 kg (146 lb 2.6 oz)          Admit Date: 7/11/2017     Discharge Date:   Today's Date: 7/13/2017  Attending Doctor:  Salena Mojica*                  Allergies:  Review of patient's allergies indicates no known allergies.          Your appointments     Jul 25, 2017 11:15 AM   Pre / Post GYN Surgical Visit with Salena Eisenberg M.D.   [facility])    [address]  [facility] [address] [phone]              Follow-up Information     1. Follow up with Salena Eisenberg M.D. On 7/25/2017.    Specialty:  OB/Gyn    Why:  For wound re-check    Contact information    [address]  [facility] [id number] [phone]           Medication List      Take these Medications        Instructions    amlodipine 5 MG Tabs   Commonly known as:  NORVASC    TAKE 1 TAB BY MOUTH EVERY DAY.       ibuprofen 600 MG Tabs   Commonly known as:  MOTRIN    Take 1 Tab by mouth every 6 hours as needed for Mild Pain.   Dose:  600 mg       losartan 100 MG Tabs   Commonly known as:  COZAAR    TAKE 1 TAB BY MOUTH EVERY DAY.       ondansetron 4 MG Tabs tablet   Commonly known as:  ZOFRAN    Take 1 Tab by mouth every four hours as needed for Nausea/Vomiting (ODT).   Dose:  4 mg       oxycodone-acetaminophen 5-325 MG Tabs   Commonly known as:  PERCOCET    Take 1 Tab by mouth every 6 hours as needed for Moderate Pain.   Dose:  1 Tab         "

## 2017-07-12 PROCEDURE — 700111 HCHG RX REV CODE 636 W/ 250 OVERRIDE (IP): Performed by: OBSTETRICS & GYNECOLOGY

## 2017-07-12 PROCEDURE — 700102 HCHG RX REV CODE 250 W/ 637 OVERRIDE(OP): Performed by: OBSTETRICS & GYNECOLOGY

## 2017-07-12 PROCEDURE — A9270 NON-COVERED ITEM OR SERVICE: HCPCS | Performed by: OBSTETRICS & GYNECOLOGY

## 2017-07-12 PROCEDURE — 770001 HCHG ROOM/CARE - MED/SURG/GYN PRIV*

## 2017-07-12 RX ADMIN — ONDANSETRON 4 MG: 2 INJECTION INTRAMUSCULAR; INTRAVENOUS at 05:45

## 2017-07-12 RX ADMIN — ONDANSETRON 4 MG: 2 INJECTION INTRAMUSCULAR; INTRAVENOUS at 20:57

## 2017-07-12 RX ADMIN — OXYCODONE HYDROCHLORIDE AND ACETAMINOPHEN 1 TABLET: 5; 325 TABLET ORAL at 20:57

## 2017-07-12 RX ADMIN — OXYCODONE HYDROCHLORIDE AND ACETAMINOPHEN 1 TABLET: 5; 325 TABLET ORAL at 05:45

## 2017-07-12 RX ADMIN — ONDANSETRON 4 MG: 2 INJECTION INTRAMUSCULAR; INTRAVENOUS at 13:29

## 2017-07-12 RX ADMIN — OXYCODONE HYDROCHLORIDE AND ACETAMINOPHEN 1 TABLET: 5; 325 TABLET ORAL at 16:24

## 2017-07-12 ASSESSMENT — PAIN SCALES - GENERAL
PAINLEVEL_OUTOF10: 6
PAINLEVEL_OUTOF10: 6
PAINLEVEL_OUTOF10: 3
PAINLEVEL_OUTOF10: 4
PAINLEVEL_OUTOF10: 4
PAINLEVEL_OUTOF10: 0
PAINLEVEL_OUTOF10: 7
PAINLEVEL_OUTOF10: 0

## 2017-07-12 NOTE — PROGRESS NOTES
Received report from evening RN.  Assumed care of patient.  Patient A&Ox4.  Patient declines pain medication at this time, resting comfortably in bed.  Family at bedside. Quinonez to gravity with clear, yellow urine.  No complaints of nausea at this time.  Tolerating clear liquid diet.  Patient ambulating with a  Steady gait.  Abdominal incision with dressing CDI, no redness or drainage noted.  Patient passing flatus, no BM.  Plan of care discussed.  Call light within reach.  Bed in lowest position.

## 2017-07-12 NOTE — CARE PLAN
Problem: Bowel/Gastric:  Goal: Normal bowel function is maintained or improved  Outcome: PROGRESSING AS EXPECTED  Patient having complaints of nausea with movement, medicated with zofran per MAR.      Problem: Pain Management  Goal: Pain level will decrease to patient’s comfort goal  Outcome: PROGRESSING AS EXPECTED  Patient's pain well controlled with PO pain medication.  Patient resting comfortably in bed at this time.

## 2017-07-12 NOTE — PROGRESS NOTES
GYN Post OP Progress Note    Name:   Ines Soliz   Date/Time:  7/12/2017 - 9:27 AM  Chief Admitting Dx:  INTRAMURAL LEIMYOMA OF UTERUS  AUB  CPP  S/P ABDOMINAL WESLEY WITH BS  Post-Op/Post Partum Days #:  1    Subjective:  Abdominal pain: no  Ambulating:   yes  Tolerating liquids:  yes  Tolerating food:  yes common adult  Flatus:   yes      Filed Vitals:    07/11/17 1855 07/11/17 2315 07/12/17 0255 07/12/17 0758   BP: 124/73 158/85 122/65 118/65   Pulse: 73 79 66 59   Temp: 36.5 °C (97.7 °F) 36.7 °C (98.1 °F) 36.3 °C (97.4 °F) 36.3 °C (97.4 °F)   Resp: 17 19 17 16   Height:       Weight:       SpO2: 94% 93% 95% 94%       Exam:  Abdomen: soft, NABS, incision dry/clean/intact    Meds:  Current Facility-Administered Medications   Medication Dose   • lactated ringers infusion     • Pharmacy Consult Request ...Pain Management Review 1 Each  1 Each   • ibuprofen (MOTRIN) tablet 600 mg  600 mg   • ondansetron (ZOFRAN) syringe/vial injection 4 mg  4 mg   • oxycodone-acetaminophen (PERCOCET) 5-325 MG per tablet 1 Tab  1 Tab       Labs:       Assessment: POD 1   Chief Admitting Dx:  INTRAMURAL LEIMYOMA OF UTERUS  AUB  CPP  S/P ABDOMINAL WESLEY- BS  Stable    Plan:  Continue routine post op care  Ambulate  Use incentive spirometer  D/c adry Eisenberg M.D.

## 2017-07-12 NOTE — PROGRESS NOTES
Quinonez catheter discontinued per MD.  Patient educated on need to void within 6 hours.  Incentive spirometer provided.  Patient given education on its use, demonstrated understanding.

## 2017-07-12 NOTE — CARE PLAN
Problem: Infection  Goal: Will remain free from infection  Outcome: PROGRESSING AS EXPECTED  Pt educated on the s/s of infection, verbalizes understanding, will continue to monitor    Problem: Pain Management  Goal: Pain level will decrease to patient’s comfort goal  Outcome: PROGRESSING AS EXPECTED  Pt reports pain 4/10 in abd. Medicated per MAR.

## 2017-07-12 NOTE — PROGRESS NOTES
Pt axo vss, pain in abd tx with percocet. Received one dose of zofran overnight, states nausea is improving. abd inc c/d/i. No BM overnight hypoactive BS + flatus, remains on clear liquids.  Quinonez in place draining clear yellow urine. Continue with plan of care.

## 2017-07-13 LAB
ERYTHROCYTE [DISTWIDTH] IN BLOOD BY AUTOMATED COUNT: 38.1 FL (ref 35.9–50)
HCT VFR BLD AUTO: 50 % (ref 37–47)
HGB BLD-MCNC: 16.7 G/DL (ref 12–16)
MCH RBC QN AUTO: 28.3 PG (ref 27–33)
MCHC RBC AUTO-ENTMCNC: 33.4 G/DL (ref 33.6–35)
MCV RBC AUTO: 84.7 FL (ref 81.4–97.8)
PLATELET # BLD AUTO: 248 K/UL (ref 164–446)
PMV BLD AUTO: 9.8 FL (ref 9–12.9)
RBC # BLD AUTO: 5.9 M/UL (ref 4.2–5.4)
WBC # BLD AUTO: 8.2 K/UL (ref 4.8–10.8)

## 2017-07-13 PROCEDURE — 770001 HCHG ROOM/CARE - MED/SURG/GYN PRIV*

## 2017-07-13 PROCEDURE — 700111 HCHG RX REV CODE 636 W/ 250 OVERRIDE (IP): Performed by: OBSTETRICS & GYNECOLOGY

## 2017-07-13 PROCEDURE — 85027 COMPLETE CBC AUTOMATED: CPT

## 2017-07-13 PROCEDURE — 36415 COLL VENOUS BLD VENIPUNCTURE: CPT

## 2017-07-13 PROCEDURE — 700102 HCHG RX REV CODE 250 W/ 637 OVERRIDE(OP): Performed by: OBSTETRICS & GYNECOLOGY

## 2017-07-13 PROCEDURE — A9270 NON-COVERED ITEM OR SERVICE: HCPCS | Performed by: OBSTETRICS & GYNECOLOGY

## 2017-07-13 RX ORDER — ONDANSETRON 4 MG/1
4 TABLET, FILM COATED ORAL EVERY 4 HOURS PRN
Qty: 30 TAB | Refills: 1 | Status: SHIPPED | OUTPATIENT
Start: 2017-07-13 | End: 2017-09-22

## 2017-07-13 RX ORDER — IBUPROFEN 600 MG/1
600 TABLET ORAL EVERY 6 HOURS PRN
Qty: 30 TAB | Refills: 3 | Status: SHIPPED | OUTPATIENT
Start: 2017-07-13 | End: 2017-09-22

## 2017-07-13 RX ORDER — OXYCODONE HYDROCHLORIDE AND ACETAMINOPHEN 5; 325 MG/1; MG/1
1 TABLET ORAL EVERY 6 HOURS PRN
Qty: 30 TAB | Refills: 0 | Status: SHIPPED | OUTPATIENT
Start: 2017-07-13 | End: 2017-09-22

## 2017-07-13 RX ORDER — ONDANSETRON 4 MG/1
4 TABLET, ORALLY DISINTEGRATING ORAL EVERY 4 HOURS PRN
Status: DISCONTINUED | OUTPATIENT
Start: 2017-07-13 | End: 2017-07-14 | Stop reason: HOSPADM

## 2017-07-13 RX ADMIN — OXYCODONE HYDROCHLORIDE AND ACETAMINOPHEN 1 TABLET: 5; 325 TABLET ORAL at 11:57

## 2017-07-13 RX ADMIN — ONDANSETRON 4 MG: 2 INJECTION INTRAMUSCULAR; INTRAVENOUS at 11:57

## 2017-07-13 RX ADMIN — OXYCODONE HYDROCHLORIDE AND ACETAMINOPHEN 1 TABLET: 5; 325 TABLET ORAL at 07:32

## 2017-07-13 RX ADMIN — ONDANSETRON 4 MG: 4 TABLET, ORALLY DISINTEGRATING ORAL at 17:59

## 2017-07-13 RX ADMIN — ONDANSETRON 4 MG: 4 TABLET, ORALLY DISINTEGRATING ORAL at 22:16

## 2017-07-13 ASSESSMENT — PAIN SCALES - GENERAL
PAINLEVEL_OUTOF10: 7
PAINLEVEL_OUTOF10: 6
PAINLEVEL_OUTOF10: ASSUMED PAIN PRESENT
PAINLEVEL_OUTOF10: 9
PAINLEVEL_OUTOF10: 5

## 2017-07-13 NOTE — DISCHARGE SUMMARY
Discharge Summary:      Ines Soliz      Admit Date:   2017  Discharge Date:  2017     Admitting diagnosis:  INTRAMURAL LEIMYOMA OF UTERUS/ADENOMYOSIS  AUB; CPP  Discharge Diagnosis: Status post ABDOMINAL WESLEY-BS POD 2   STABLE     History:  Past Medical History   Diagnosis Date   • Elevated blood pressure complicating pregnancy, antepartum    • Family history of diabetes mellitus    • Family history of ischemic heart disease    • History of thyroidectomy, subtotal    • Hypertension 2012   • Fibroid uterus      relatively stable   • Choledocholithiasis with obstruction 2014   • Dental disorder      upper plate     OB History    Para Term  AB SAB TAB Ectopic Multiple Living   1 1        1      # Outcome Date GA Lbr Juan/2nd Weight Sex Delivery Anes PTL Lv   1 Para                    Review of patient's allergies indicates no known allergies.  Patient Active Problem List    Diagnosis Date Noted   • Vaginal hemorrhage 2017   • Hypokalemia 2016   • Choledocholithiasis with obstruction 2014   • Essential hypertension 2012   • History of iron deficiency anemia 2011   • History of thyroidectomy, subtotal    • Family history of ischemic heart disease         Hospital Course:   46 y.o. S/P abdominal WESLEY - BS POD 2. Doing well. Pain is well controlled. Tolerating regular diet, ambulating, no urinary and bowel symptoms  Patient postop course was unremarkable,  complaints today and desires discharge.      Filed Vitals:    17 1700 17 1955 17 0305 17 0744   BP:  130/70 124/69 129/81   Pulse:  64 62 67   Temp:  37.4 °C (99.4 °F) 36.3 °C (97.4 °F) 37.1 °C (98.8 °F)   Resp:  18 16 16   Height:       Weight: 66.3 kg (146 lb 2.6 oz)      SpO2:  96% 95% 94%       Current Facility-Administered Medications   Medication Dose   • lactated ringers infusion     • Pharmacy Consult Request ...Pain Management Review 1 Each  1 Each   • ibuprofen  (MOTRIN) tablet 600 mg  600 mg   • ondansetron (ZOFRAN) syringe/vial injection 4 mg  4 mg   • oxycodone-acetaminophen (PERCOCET) 5-325 MG per tablet 1 Tab  1 Tab       Exam:  Abdomen: Abdomen soft, non-tender. BS normal. No masses,  No organomegaly  Incision: dry and intact  Perineum: perineum intact  Extremity: extremities, peripheral pulses and reflexes normal     Labs:         Activity:   Discharge to home  Pelvic Rest x 6 weeks    Assessment:  Stable, normal post op course     Follow up:   Dr Sherwin Martino - 2 weeks for incision check      Discharge Meds:   Current Outpatient Prescriptions   Medication Sig Dispense Refill   • oxycodone-acetaminophen (PERCOCET) 5-325 MG Tab Take 1 Tab by mouth every 6 hours as needed for Moderate Pain. 30 Tab 0   • ibuprofen (MOTRIN) 600 MG Tab Take 1 Tab by mouth every 6 hours as needed for Mild Pain. 30 Tab 3   • ondansetron (ZOFRAN) 4 MG Tab tablet Take 1 Tab by mouth every four hours as needed for Nausea/Vomiting (ODT). 30 Tab 1       Salena Eisenberg M.D.

## 2017-07-13 NOTE — PROGRESS NOTES
Patient had 200 mL of orange emesis.  Patient unable to keep down food.  Patient does not wish to discharge home at this time.

## 2017-07-13 NOTE — PROGRESS NOTES
"/70 mmHg  Pulse 64  Temp(Src) 37.4 °C (99.4 °F)  Resp 18  Ht 1.6 m (5' 3\")  Wt 66.3 kg (146 lb 2.6 oz)  BMI 25.90 kg/m2  SpO2 96%  LMP 06/24/2017  Breastfeeding? No    Patient is A&Ox4.   Reports pain to abdomen, medicated per MAR.   JONES, CMS intact; refused to mobilize at this time d/t dizziness and nausea.   On RA, denies SOB, chest pain.   Normoactive BS x 4. Reports nausea, addressed per MAR. +flatus, - BM. Pt is voiding q shift.   Midline abdominal incision c/d/i.   Labs noted.   Assessment completed.   Updated on POC. Belongings and call light within reach. All needs met at this time.   "

## 2017-07-13 NOTE — OP REPORT
OPERATIVE REPORT    PreOp Diagnosis: 1. Fibroid Uterus                                2. Abnormal Uterine Bleeding                                3. Chronic Pelvic Pain    PostOp Diagnosis: same    Procedure(s):  ABDOMINAL HYSTERECTOMY TOTAL - SUPRACERVICAL  - Wound Class: Clean  BILATERAL SALPINGECTOMY - Wound Class: Clean    Surgeon(s):  Salena Quiñones M.D.    Anesthesiologist/Type of Anesthesia:  Anesthesiologist: Rodney Campbell M.D./General    Surgical Staff:  Circulator: Dominga Bryant R.N.  Scrub Person: Pablito Lui    Specimen: uterus, bilateral fallopian tubes    Estimated Blood Loss: 50 cc    Findings: uterus is enlarged and bulky to 18 week size                  Bilateral fallopian tubes and ovaries normal    Complications: normal    DESCRIPTION OF THE PROCEDURE: The risks, benefits, indications and    alternatives of the procedure were reviewed with the patient at length and    informed consents were obtained. The patient was taken to the operating room    with IV running.    Patient was then placed in a supine position, given general anesthesia,    prepared and draped in the normal usual sterile fashion with a Quinonez catheter    placed in the normal usual sterile fashion, draining clear urine.   A formal time-out was called and antibiotics was given.  A vertical infraumbilical incision was made on the skin and carried down to the underlying fascia.  Rectus muscle was side swept and peritoneum was entered sharply and extended  cephalo-caudally.    Bowels were packed away with moist lap sponges and patient was placed on trendelenberg  position    Uterine ovarian ligaments were  doubly clamped, cut and suture ligated bilaterally. Hemostasis was noted.   The fallopian tubes were  from the ovary with bovie cautery which will be included in the specimen    Round ligaments in both sides were clamped, transected, suture ligated with 0 Vicryl.    Anterior leaf of the  broad ligaments in both side were incised to the densely adherent    bladder and was sharply and bluntly dissected from the lower uterine segment, however, it    was densely adherent to the cervix. Hemostasis was visualized. Uterine arteries were    skeletonized bilaterally, clamped with Tiarra, transected, suture ligatedwith 0 Vicryl. Excellent    hemostasis was assured.    A supracervical hysterectomy was performed with the Bovie cautery amputating the uterus at the level    of the cervical neck. The endocervix was cauterized.  The cervical stump was re-approximated    with 0 vicryl running locking stitch.  Hemostasis was assured.     Pelvis was then irrigated copiously with warm normal saline. All laparotomy sponges,    instruments were removed from the abdomen. The peritoneum was reapproximated with vicryl 2-0  The fascia was closed with running 0 Vicryl suture. Hemostasis    was assured. The skin was closed using subcuticular Monocryl 4-0 with excellent hemostasis.  Perineo was placed over the incision, covered with Dermabond with Tegaderm dressing.    Sponges, laps, needles and instrument counts were correct x2. Patient was    taken to the PACU awake in stable condition.    MALINDA PETTY MD

## 2017-07-13 NOTE — CARE PLAN
Problem: Venous Thromboembolism (VTW)/Deep Vein Thrombosis (DVT) Prevention:  Goal: Patient will participate in Venous Thrombosis (VTE)/Deep Vein Thrombosis (DVT)Prevention Measures  Outcome: PROGRESSING AS EXPECTED  SCDs in place. Pt educated to mobilize more, however refused d/t dizziness.     Problem: Pain Management  Goal: Pain level will decrease to patient’s comfort goal  Outcome: PROGRESSING AS EXPECTED  Pain managed with Percocet PRN

## 2017-07-13 NOTE — CARE PLAN
Problem: Venous Thromboembolism (VTW)/Deep Vein Thrombosis (DVT) Prevention:  Goal: Patient will participate in Venous Thrombosis (VTE)/Deep Vein Thrombosis (DVT)Prevention Measures  Outcome: PROGRESSING AS EXPECTED  Patient has JHONATAN hose and AMEs for DVT prophylaxis.      Problem: Pain Management  Goal: Pain level will decrease to patient’s comfort goal  Outcome: PROGRESSING AS EXPECTED  Patient has complaints of pain in abdomen 7/10, medicated per MAR.

## 2017-07-13 NOTE — DISCHARGE INSTRUCTIONS
Discharge Instructions    Discharged to home by car with relative. Discharged via wheelchair, hospital escort: Yes.  Special equipment needed: Not Applicable    Be sure to schedule a follow-up appointment with your primary care doctor or any specialists as instructed.     Discharge Plan:   Diet Plan: Discussed  Activity Level: Discussed  Confirmed Follow up Appointment: Patient to Call and Schedule Appointment  Confirmed Symptoms Management: Discussed  Medication Reconciliation Updated: Yes  Influenza Vaccine Indication: Not indicated: Previously immunized this influenza season and > 8 years of age    I understand that a diet low in cholesterol, fat, and sodium is recommended for good health. Unless I have been given specific instructions below for another diet, I accept this instruction as my diet prescription.   Other diet: Regular    Special Instructions: None    · Is patient discharged on Warfarin / Coumadin?   No     · Is patient Post Blood Transfusion?  No    Depression / Suicide Risk    As you are discharged from this [facility] facility, it is important to learn how to keep safe from harming yourself.    Recognize the warning signs:  · Abrupt changes in personality, positive or negative- including increase in energy   · Giving away possessions  · Change in eating patterns- significant weight changes-  positive or negative  · Change in sleeping patterns- unable to sleep or sleeping all the time   · Unwillingness or inability to communicate  · Depression  · Unusual sadness, discouragement and loneliness  · Talk of wanting to die  · Neglect of personal appearance   · Rebelliousness- reckless behavior  · Withdrawal from people/activities they love  · Confusion- inability to concentrate     If you or a loved one observes any of these behaviors or has concerns about self-harm, here's what you can do:  · Talk about it- your feelings and reasons for harming yourself  · Remove any means that you might use to hurt  yourself (examples: pills, rope, extension cords, firearm)  · Get professional help from the community (Mental Health, Substance Abuse, psychological counseling)  · Do not be alone:Call your Safe Contact- someone whom you trust who will be there for you.  · Call your local CRISIS HOTLINE 014-8954 or 774-435-4512  · Call your local Children's Mobile Crisis Response Team [place] (267) 929-5589 or www.myhub  · Call the toll free National Suicide Prevention Hotlines   · National Suicide Prevention Lifeline 972-310-ICWM (4519)  · [place] Line Network 800-SUICIDE (634-4321)    Abdominal Hysterectomy  Abdominal hysterectomy is a surgical procedure to remove your womb (uterus). Your uterus is the muscular organ that contains a developing baby. This surgery is done for many reasons. You may need an abdominal hysterectomy if you have cancer, growths (tumors), long-term pain, or bleeding. You may also have this procedure if your uterus has slipped down into your vagina (uterine prolapse).  Depending on why you need an abdominal hysterectomy, you may also have other reproductive organs removed. These could include the part of your vagina that connects with your uterus (cervix), the organs that make eggs (ovaries), and the tubes that connect the ovaries to the uterus (fallopian tubes).  LET YOUR HEALTH CARE PROVIDER KNOW ABOUT:   · Any allergies you have.  · All medicines you are taking, including vitamins, herbs, eye drops, creams, and over-the-counter medicines.  · Previous problems you or members of your family have had with the use of anesthetics.  · Any blood disorders you have.  · Previous surgeries you have had.  · Medical conditions you have.  RISKS AND COMPLICATIONS  Generally, this is a safe procedure. However, as with any procedure, problems can occur. Infection is the most common problem after an abdominal hysterectomy. Other possible problems include:  · Bleeding.  · Formation of blood clots  that may break free and travel to your lungs.  · Injury to other organs near your uterus.  · Nerve injury causing nerve pain.  · Decreased interest in sex or pain during sexual intercourse.  BEFORE THE PROCEDURE  · Abdominal hysterectomy is a major surgical procedure. It can affect the way you feel about yourself. Talk to your health care provider about the physical and emotional changes hysterectomy may cause.  · You may need to have blood work and X-rays done before surgery.  · Quit smoking if you smoke. Ask your health care provider for help if you are struggling to quit.  · Stop taking medicines that thin your blood as directed by your health care provider.  · You may be instructed to take antibiotic medicines or laxatives before surgery.  · Do not eat or drink anything for 6-8 hours before surgery.  · Take your regular medicines with a small sip of water.  · Bathe or shower the night or morning before surgery.  PROCEDURE  · Abdominal hysterectomy is done in the operating room at the hospital.  · In most cases, you will be given a medicine that makes you go to sleep (general anesthetic).  · The surgeon will make a cut (incision) through the skin in your lower belly.  · The incision may be about 5-7 inches long. It may go side-to-side or up-and-down.  · The surgeon will move aside the body tissue that covers your uterus. The surgeon will then carefully take out your uterus along with any of your other reproductive organs that need to be removed.  · Bleeding will be controlled with clamps or sutures.  · The surgeon will close your incision with sutures or metal clips.  AFTER THE PROCEDURE  · You will have some pain immediately after the procedure.  · You will be given pain medicine in the recovery room.  · You will be taken to your hospital room when you have recovered from the anesthesia.  · You may need to stay in the hospital for 2-5 days.  · You will be given instructions for recovery at home.     This  information is not intended to replace advice given to you by your health care provider. Make sure you discuss any questions you have with your health care provider.     Document Released: 12/23/2014 Document Reviewed: 12/23/2014  [employer] Interactive Patient Education ©2016 [employer]    Abdominal Hysterectomy, Care After  These instructions give you information on caring for yourself after your procedure. Your doctor may also give you more specific instructions. Call your doctor if you have any problems or questions after your procedure.   HOME CARE  It takes 4-6 weeks to recover from this surgery. Follow all of your doctor's instructions.   · Only take medicines as told by your doctor.  · Change your bandage as told by your doctor.  · Return to your doctor to have your stitches taken out.  · Take showers for 2-3 weeks. Ask your doctor when it is okay to shower.  · Do not douche, use tampons, or have sex (intercourse) for at least 6 weeks or as told.  · Follow your doctor's advice about exercise, lifting objects, driving, and general activities.  · Get plenty of rest and sleep.  · Do not lift anything heavier than a gallon of milk (about 10 pounds [4.5 kilograms]) for the first month after surgery.  · Get back to your normal diet as told by your doctor.  · Do not drink alcohol until your doctor says it is okay.  · Take a medicine to help you poop (laxative) as told by your doctor.  · Eating foods high in fiber may help you poop. Eat a lot of raw fruits and vegetables, whole grains, and beans.  · Drink enough fluids to keep your pee (urine) clear or pale yellow.  · Have someone help you at home for 1-2 weeks after your surgery.  · Keep follow-up doctor visits as told.  GET HELP IF:  · You have chills or fever.  · You have puffiness, redness, or pain in area of the cut (incision).  · You have yellowish-white fluid (pus) coming from the cut.  · You have a bad smell coming from the cut or bandage.  · Your cut pulls  apart.  · You feel dizzy or light-headed.  · You have pain or bleeding when you pee.  · You keep having watery poop (diarrhea).  · You keep feeling sick to your stomach (nauseous) or keep throwing up (vomiting).  · You have fluid (discharge) coming from your vagina.  · You have a rash.  · You have a reaction to your medicine.  · You need stronger pain medicine.  GET HELP RIGHT AWAY IF:   · You have a fever and your symptoms suddenly get worse.  · You have bad belly (abdominal) pain.  · You have chest pain.  · You are short of breath.  · You pass out (faint).  · You have pain, puffiness, or redness of your leg.  · You bleed a lot from your vagina and notice clumps of tissue (clots).  MAKE SURE YOU:   · Understand these instructions.  · Will watch your condition.  · Will get help right away if you are not doing well or get worse.     This information is not intended to replace advice given to you by your health care provider. Make sure you discuss any questions you have with your health care provider.     Document Released: 09/26/2009 Document Revised: 12/23/2014 Document Reviewed: 10/10/2014  [employer] Interactive Patient Education ©2016 [employer]

## 2017-07-13 NOTE — PROGRESS NOTES
Received report from evening RN.  Assumed care of patient.  Patient A&Ox4.  C/O pain 7/10 in abdomen, medicated per MAR.  No complaints of nausea or vomiting at this time.  +void, +flatus.  No BM.  Abdominal incision with dressing intact, no redness or drainage noted.  Ambulating with steady gait.  Patient educated that she needs to get up and ambulate halls today.  IS encouraged.  Plan of care discussed.  Family at bedside.  Call light within reach.  Bed in lowest position.

## 2017-07-14 VITALS
HEIGHT: 63 IN | TEMPERATURE: 97.9 F | HEART RATE: 66 BPM | BODY MASS INDEX: 25.9 KG/M2 | SYSTOLIC BLOOD PRESSURE: 114 MMHG | OXYGEN SATURATION: 92 % | DIASTOLIC BLOOD PRESSURE: 73 MMHG | WEIGHT: 146.16 LBS | RESPIRATION RATE: 16 BRPM

## 2017-07-14 PROCEDURE — A9270 NON-COVERED ITEM OR SERVICE: HCPCS | Performed by: OBSTETRICS & GYNECOLOGY

## 2017-07-14 PROCEDURE — 700102 HCHG RX REV CODE 250 W/ 637 OVERRIDE(OP): Performed by: OBSTETRICS & GYNECOLOGY

## 2017-07-14 PROCEDURE — 700111 HCHG RX REV CODE 636 W/ 250 OVERRIDE (IP): Performed by: OBSTETRICS & GYNECOLOGY

## 2017-07-14 RX ADMIN — OXYCODONE HYDROCHLORIDE AND ACETAMINOPHEN 1 TABLET: 5; 325 TABLET ORAL at 11:16

## 2017-07-14 RX ADMIN — OXYCODONE HYDROCHLORIDE AND ACETAMINOPHEN 1 TABLET: 5; 325 TABLET ORAL at 06:17

## 2017-07-14 RX ADMIN — ONDANSETRON 4 MG: 4 TABLET, ORALLY DISINTEGRATING ORAL at 11:16

## 2017-07-14 RX ADMIN — ONDANSETRON 4 MG: 4 TABLET, ORALLY DISINTEGRATING ORAL at 06:17

## 2017-07-14 ASSESSMENT — PAIN SCALES - GENERAL
PAINLEVEL_OUTOF10: 6
PAINLEVEL_OUTOF10: 7

## 2017-07-14 NOTE — CARE PLAN
Problem: Bowel/Gastric:  Goal: Normal bowel function is maintained or improved  Outcome: PROGRESSING AS EXPECTED  Patient doesn't have any complaints of nausea or vomiting at this time.  Nausea controlled with PO zofran.      Problem: Pain Management  Goal: Pain level will decrease to patient’s comfort goal  Outcome: PROGRESSING AS EXPECTED  Patient's pain well controlled with PO pain meds at this time.

## 2017-07-14 NOTE — PROGRESS NOTES
Patient tolerating PO intake without nausea or vomiting.  Pain is well controlled with PO pain medication.  Patient ambulated with a steady gait, with no complaints of dizziness.  Prescriptions and discharge instructions given to patient.  All questions answered.

## 2017-07-14 NOTE — PROGRESS NOTES
RN notified Dr. Jair Eisenberg of patient staying overnight due to nausea and vomiting.  Orders received for a CBC.

## 2017-07-14 NOTE — PROGRESS NOTES
Patient is A&Ox4. Family at bedside.   Pain is controlled at this time.   JONES, CMS intact, mobilizes with SBA, gait steady, denies dizziness, denies new onset numbness and tingling.   On RA, denies SOB, denies chest pain.   Normoactive BS x 4. Reports nausea, thus refused to ambulate at this time. +flatus, - BM. Pt is voiding q shift.   Midline abdominal incision, c/d/i   No PIV, MD aware.   Labs noted.   Assessment completed.   Updated pt and family on POC. Belongings and call light within reach. All needs met at this time.

## 2017-07-14 NOTE — PROGRESS NOTES
Received report from evening RN.  Assumed care of patient.  Patient A&Ox4.  Family at bedside.  Abdominal incision with dressing CDI.  No complaints of nausea at this time.  Patient had crackers/ginger ale overnight without vomiting.  C/O pain 7/10 in abdomen, previously medicated for pain this AM. Patient up to bathroom with SBA.  Ambulating halls overnight. Plan of care dicussed.  Call light within reach.  Bed in lowest position.

## 2017-07-14 NOTE — CARE PLAN
Problem: Venous Thromboembolism (VTW)/Deep Vein Thrombosis (DVT) Prevention:  Goal: Patient will participate in Venous Thrombosis (VTE)/Deep Vein Thrombosis (DVT)Prevention Measures  Outcome: PROGRESSING AS EXPECTED  Refused SCDs. Pt is mobilizing and is ambulatory.     Problem: Pain Management  Goal: Pain level will decrease to patient’s comfort goal  Outcome: PROGRESSING AS EXPECTED  Pain managed with Percocet PRN

## 2017-07-25 ENCOUNTER — GYNECOLOGY VISIT (OUTPATIENT)
Dept: OBGYN | Facility: MEDICAL CENTER | Age: 47
End: 2017-07-25
Payer: COMMERCIAL

## 2017-07-25 VITALS
BODY MASS INDEX: 26.58 KG/M2 | WEIGHT: 150 LBS | SYSTOLIC BLOOD PRESSURE: 100 MMHG | DIASTOLIC BLOOD PRESSURE: 70 MMHG | HEIGHT: 63 IN

## 2017-07-25 DIAGNOSIS — Z51.89 VISIT FOR WOUND CHECK: ICD-10-CM

## 2017-07-25 DIAGNOSIS — Z90.711: ICD-10-CM

## 2017-07-25 PROBLEM — N93.9 VAGINAL HEMORRHAGE: Status: RESOLVED | Noted: 2017-07-11 | Resolved: 2017-07-25

## 2017-07-25 PROCEDURE — 99024 POSTOP FOLLOW-UP VISIT: CPT | Performed by: OBSTETRICS & GYNECOLOGY

## 2017-07-25 NOTE — MR AVS SNAPSHOT
"Ines Soliz   2017 11:15 AM   Gynecology Visit   MRN: [id number]    [facility] Phone[facility]  [phone]    Description:  Female : 1970   Provider:  Salena Eisenberg M.D.           Reason for Visit     Follow-Up 2 wks post-op follow up       Allergies as of 2017     No Known Allergies      Vital Signs     Blood Pressure Height Weight Body Mass Index Last Menstrual Period Smoking Status    100/70 mmHg 1.6 m (5' 2.99\") 68.04 kg (150 lb) 26.58 kg/m2 2017 Never Smoker       Basic Information     Date Of Birth Sex Race Ethnicity Preferred Language    1970 Female  Non- English      Your appointments     Aug 08, 2017 11:00 AM   Pre / Post GYN Surgical Visit with Salena Eisenberg M.D.   [facility]    [address]  [facility] [id number] [phone]              Problem List              ICD-10-CM Priority Class Noted - Resolved    Family history of ischemic heart disease Z82.49   Unknown - Present    History of thyroidectomy, subtotal E89.0   Unknown - Present    History of iron deficiency anemia Z86.2   2011 - Present    Essential hypertension I10   2012 - Present    Choledocholithiasis with obstruction K80.51   2014 - Present    Hypokalemia E87.6   2016 - Present    Vaginal hemorrhage N93.9   2017 - Present      Health Maintenance        Date Due Completion Dates    PAP SMEAR 2016 (Prv Comp), 2012    Override on 2013: Previously completed (negative)    IMM INFLUENZA (1) 2017 10/3/2016, 10/5/2015, 10/13/2014, 10/13/2014, 2013, 10/5/2011    MAMMOGRAM 2018, 2015, 2014, 2014 (Prv Comp), 2011    Override on 2014: Previously completed (category 1, [place])    IMM DTaP/Tdap/Td Vaccine (2 - Td) 2025            Current Immunizations     Hepatitis B " Vaccine Non-Recombivax (Ped/Adol) 1/17/2012, 8/16/2011, 7/12/2011    Influenza TIV (IM) 10/13/2014, 9/27/2013, 10/5/2011    Influenza Vaccine Quad Inj (Pf) 10/3/2016  8:15 AM, 10/5/2015  9:25 AM, 10/13/2014    Tdap Vaccine 7/31/2015    Tuberculin Skin Test 3/7/2014  1:40 PM, 2/28/2014  1:45 PM      Below and/or attached are the medications your provider expects you to take. Review all of your home medications and newly ordered medications with your provider and/or pharmacist. Follow medication instructions as directed by your provider and/or pharmacist. Please keep your medication list with you and share with your provider. Update the information when medications are discontinued, doses are changed, or new medications (including over-the-counter products) are added; and carry medication information at all times in the event of emergency situations     Allergies:  No Known Allergies          Medications  Valid as of: July 25, 2017 - 12:00 PM    Generic Name Brand Name Tablet Size Instructions for use    AmLODIPine Besylate (Tab) NORVASC 5 MG TAKE 1 TAB BY MOUTH EVERY DAY.        Ibuprofen (Tab) MOTRIN 600 MG Take 1 Tab by mouth every 6 hours as needed for Mild Pain.        Losartan Potassium (Tab) COZAAR 100 MG TAKE 1 TAB BY MOUTH EVERY DAY.        Ondansetron HCl (Tab) ZOFRAN 4 MG Take 1 Tab by mouth every four hours as needed for Nausea/Vomiting (ODT).        Oxycodone-Acetaminophen (Tab) PERCOCET 5-325 MG Take 1 Tab by mouth every 6 hours as needed for Moderate Pain.        .                 Medicines prescribed today were sent to:     [id number] - [place], [place] - [address]    [address] [place] [address]    Phone: [phone] Fax: [phone]    Open 24 Hours?: No      Medication refill instructions:       If your prescription bottle indicates you have medication refills left, it is not necessary to call your provider’s office. Please contact your pharmacy and they will refill your medication.      If your prescription bottle indicates you do not have any refills left, you may request refills at any time through one of the following ways: The online [employer] system (except Urgent Care), by calling your provider’s office, or by asking your pharmacy to contact your provider’s office with a refill request. Medication refills are processed only during regular business hours and may not be available until the next business day. Your provider may request additional information or to have a follow-up visit with you prior to refilling your medication.   *Please Note: Medication refills are assigned a new Rx number when refilled electronically. Your pharmacy may indicate that no refills were authorized even though a new prescription for the same medication is available at the pharmacy. Please request the medicine by name with the pharmacy before contacting your provider for a refill.           InfiKno Access Code: Activation code not generated  Current InfiKno Status: Active

## 2017-07-26 NOTE — PROGRESS NOTES
Chief Complaint   Patient presents with   • Follow-Up     2 wks post-op follow up      History of present illness:   46 y.o. S/p abdominal WESLEY-BS presents for wound check  Doing well  No vaginal bleeding  Pain is well controlled  No UTI symptoms, no constipation  Review of systems:  Pertinent positives documented in HPI and all other systems reviewed & are negative    All PMH, PSH, allergies, social history and FH reviewed and updated today:  Past Medical History   Diagnosis Date   • Elevated blood pressure complicating pregnancy, antepartum 2002   • Family history of diabetes mellitus    • Family history of ischemic heart disease    • History of thyroidectomy, subtotal    • Hypertension 4/2012   • Fibroid uterus      relatively stable   • Choledocholithiasis with obstruction 6/2014   • Dental disorder      upper plate       Past Surgical History   Procedure Laterality Date   • Thyroidectomy  1997   • Ercp in or  6/15/2014     Performed by Raymon Mena M.D. at SURGERY [facility]   • Genia by laparoscopy  6/16/2014     Performed by Raghu Mata M.D. at SURGERY SAME DAY [facility]S   • Cholecystectomy  6/16/2014     Performed by Raghu Mata M.D. at SURGERY SAME DAY [facility]   • Abdominal hysterectomy total  7/11/2017     Procedure: ABDOMINAL HYSTERECTOMY TOTAL - SUPRACERVICAL ;  Surgeon: Salena Eisenberg M.D.;  Location: SURGERY SAME DAY [facility];  Service:    • Salpingectomy Bilateral 7/11/2017     Procedure: SALPINGECTOMY;  Surgeon: Salena Eisenberg M.D.;  Location: SURGERY SAME DAY [facility];  Service:        Allergies: No Known Allergies    Social History     Social History   • Marital Status:      Spouse Name: N/A   • Number of Children: N/A   • Years of Education: N/A     Occupational History   • Not on file.     Social History Main Topics   • Smoking status: Never Smoker    • Smokeless tobacco: Never Used   • Alcohol Use: No   • Drug Use: No   • Sexual  "Activity: Yes     Other Topics Concern   • Not on file     Social History Narrative       Family History   Problem Relation Age of Onset   • Diabetes Mother    • Heart Disease Father 70     heart attack   • Hypertension Father        Physical exam:  Blood pressure 100/70, height 1.6 m (5' 2.99\"), weight 68.04 kg (150 lb), last menstrual period 06/24/2017.    General:appears stated age, is in no apparent distress, is well developed and well nourished  Abdomen: Bowel sounds positive, nondistended, soft, nontender x4, no rebound or guarding. No organomegaly. No masses.  INCISION - DRY/CLEAN/INTACT  Skin: No rashes, or ulcers or lesions seen  Psychiatric: Patient shows appropriate affect, is alert and oriented x3, intact judgment and insight.  1. Visit for wound check     2. Status post partial hysterectomy with remaining cervical stump with bilateral salpingectomy     3. Stable  4. Pily-rochelle dressing removed  5. Ambulate  6. Pelvic rest  7. Post op check 6 weeks    "

## 2017-08-08 ENCOUNTER — GYNECOLOGY VISIT (OUTPATIENT)
Dept: OBGYN | Facility: MEDICAL CENTER | Age: 47
End: 2017-08-08
Payer: COMMERCIAL

## 2017-08-08 VITALS
WEIGHT: 152 LBS | HEIGHT: 63 IN | SYSTOLIC BLOOD PRESSURE: 120 MMHG | BODY MASS INDEX: 26.93 KG/M2 | DIASTOLIC BLOOD PRESSURE: 94 MMHG

## 2017-08-08 DIAGNOSIS — Z98.890 POST-OPERATIVE STATE: ICD-10-CM

## 2017-08-08 DIAGNOSIS — Z51.89 VISIT FOR WOUND CHECK: ICD-10-CM

## 2017-08-08 PROCEDURE — 99024 POSTOP FOLLOW-UP VISIT: CPT | Performed by: OBSTETRICS & GYNECOLOGY

## 2017-08-08 NOTE — MR AVS SNAPSHOT
"Ines Soliz   2017 11:00 AM   Gynecology Visit   MRN: [id number]    Department:  [facility]   Dept Phone:  [phone]    Description:  Female : 1970   Provider:  Salena Eisenberg M.D.           Reason for Visit     Follow-Up Post-op follow up       Allergies as of 2017     No Known Allergies      Vital Signs     Blood Pressure Height Weight Body Mass Index Smoking Status       120/94 mmHg 1.6 m (5' 2.99\") 68.947 kg (152 lb) 26.93 kg/m2 Never Smoker        Basic Information     Date Of Birth Sex Race Ethnicity Preferred Language    1970 Female  Non- English      Your appointments     Aug 29, 2017  8:15 AM   Healthy Tracks with LAB [facility] (--)    [address] [place] [address]. [address]  [place] [address]   [phone]            Sep 22, 2017 10:00 AM   ANNUAL EXAM PREVENTATIVE with Tamiko Paulson M.D.   [facility] ([facility])    [address] [place] [address] [address]  [place] [id number] [phone]              Problem List              ICD-10-CM Priority Class Noted - Resolved    Family history of ischemic heart disease Z82.49   Unknown - Present    History of thyroidectomy, subtotal E89.0   Unknown - Present    History of iron deficiency anemia Z86.2   2011 - Present    Essential hypertension I10   2012 - Present    Choledocholithiasis with obstruction K80.51   2014 - Present      Health Maintenance        Date Due Completion Dates    PAP SMEAR 2016 (Prv Comp), 2012    Override on 2013: Previously completed (negative)    IMM INFLUENZA (1) 2017 10/3/2016, 10/5/2015, 10/13/2014, 10/13/2014, 2013, 10/5/2011    MAMMOGRAM 2018, 2015, 2014, 2014 (Prv Comp), 2011    Override on 2014: Previously completed (category 1, [place])    IMM DTaP/Tdap/Td Vaccine (2 - Td) 2025            Current Immunizations     Hepatitis B Vaccine Non-Recombivax " (Ped/Adol) 1/17/2012, 8/16/2011, 7/12/2011    Influenza TIV (IM) 10/13/2014, 9/27/2013, 10/5/2011    Influenza Vaccine Quad Inj (Pf) 10/3/2016  8:15 AM, 10/5/2015  9:25 AM, 10/13/2014    Tdap Vaccine 7/31/2015    Tuberculin Skin Test 3/7/2014  1:40 PM, 2/28/2014  1:45 PM      Below and/or attached are the medications your provider expects you to take. Review all of your home medications and newly ordered medications with your provider and/or pharmacist. Follow medication instructions as directed by your provider and/or pharmacist. Please keep your medication list with you and share with your provider. Update the information when medications are discontinued, doses are changed, or new medications (including over-the-counter products) are added; and carry medication information at all times in the event of emergency situations     Allergies:  No Known Allergies          Medications  Valid as of: August 08, 2017 - 11:34 AM    Generic Name Brand Name Tablet Size Instructions for use    AmLODIPine Besylate (Tab) NORVASC 5 MG TAKE 1 TAB BY MOUTH EVERY DAY.        Ibuprofen (Tab) MOTRIN 600 MG Take 1 Tab by mouth every 6 hours as needed for Mild Pain.        Losartan Potassium (Tab) COZAAR 100 MG TAKE 1 TAB BY MOUTH EVERY DAY.        Ondansetron HCl (Tab) ZOFRAN 4 MG Take 1 Tab by mouth every four hours as needed for Nausea/Vomiting (ODT).        Oxycodone-Acetaminophen (Tab) PERCOCET 5-325 MG Take 1 Tab by mouth every 6 hours as needed for Moderate Pain.        .                 Medicines prescribed today were sent to:     [facility] #[id number] - [place], [place] - [address] [facility]    [address] [place] [address]    Phone: [phone] Fax: [phone]    Open 24 Hours?: No      Medication refill instructions:       If your prescription bottle indicates you have medication refills left, it is not necessary to call your provider’s office. Please contact your pharmacy and they will refill your medication.    If your prescription  bottle indicates you do not have any refills left, you may request refills at any time through one of the following ways: The online Hunch system (except Urgent Care), by calling your provider’s office, or by asking your pharmacy to contact your provider’s office with a refill request. Medication refills are processed only during regular business hours and may not be available until the next business day. Your provider may request additional information or to have a follow-up visit with you prior to refilling your medication.   *Please Note: Medication refills are assigned a new Rx number when refilled electronically. Your pharmacy may indicate that no refills were authorized even though a new prescription for the same medication is available at the pharmacy. Please request the medicine by name with the pharmacy before contacting your provider for a refill.           Hunch Access Code: Activation code not generated  Current Hunch Status: Active

## 2017-08-08 NOTE — PROGRESS NOTES
Chief Complaint   Patient presents with   • Follow-Up     Post-op follow up      History of present illness:   46 y.o. S/p abdominal WESLEY-BS 4 weeks post op presents for exam  Doing well  Some pain with exertion  No vaginal bleeding  No n/v  No urinary no bowel issues  Review of systems:  Pertinent positives documented in HPI and all other systems reviewed & are negative    All PMH, PSH, allergies, social history and FH reviewed and updated today:  Past Medical History   Diagnosis Date   • Elevated blood pressure complicating pregnancy, antepartum 2002   • Family history of diabetes mellitus    • Family history of ischemic heart disease    • History of thyroidectomy, subtotal    • Hypertension 4/2012   • Fibroid uterus      relatively stable   • Choledocholithiasis with obstruction 6/2014   • Dental disorder      upper plate       Past Surgical History   Procedure Laterality Date   • Thyroidectomy  1997   • Ercp in or  6/15/2014     Performed by Raymon Mena M.D. at SURGERY [facility]   • Genia by laparoscopy  6/16/2014     Performed by Raghu Mata M.D. at SURGERY SAME DAY [facility] ORS   • Cholecystectomy  6/16/2014     Performed by Raghu Mata M.D. at SURGERY SAME DAY [facility] ORS   • Abdominal hysterectomy total  7/11/2017     Procedure: ABDOMINAL HYSTERECTOMY TOTAL - SUPRACERVICAL ;  Surgeon: Salena Eisenberg M.D.;  Location: SURGERY SAME DAY [facility];  Service:    • Salpingectomy Bilateral 7/11/2017     Procedure: SALPINGECTOMY;  Surgeon: Salena Eisenberg M.D.;  Location: SURGERY SAME DAY [facility];  Service:        Allergies: No Known Allergies    Social History     Social History   • Marital Status:      Spouse Name: N/A   • Number of Children: N/A   • Years of Education: N/A     Occupational History   • Not on file.     Social History Main Topics   • Smoking status: Never Smoker    • Smokeless tobacco: Never Used   • Alcohol Use: No   • Drug Use: No   • Sexual  "Activity: Yes     Other Topics Concern   • Not on file     Social History Narrative       Family History   Problem Relation Age of Onset   • Diabetes Mother    • Heart Disease Father 70     heart attack   • Hypertension Father      Physical exam:  Blood pressure 120/94, height 1.6 m (5' 2.99\"), weight 68.947 kg (152 lb).    General:appears stated age, is in no apparent distress, is well developed and well nourished  Head: normocephalic, non-tender  Abdomen: Bowel sounds positive, nondistended, soft, nontender x4, no rebound or guarding. No organomegaly. No masses.  INCISION - DRY/CLEAN/INTACT  Female GYN: CERVIX - NORMAL NO BLEEDING  Skin: No rashes, or ulcers or lesions seen  Psychiatric: Patient shows appropriate affect, is alert and oriented x3, intact judgment and insight.    1. Visit for wound check     2. Post-operative state     3. Doing well  4. Gyn/annual exam    "

## 2017-08-11 ENCOUNTER — TELEPHONE (OUTPATIENT)
Dept: OBGYN | Facility: MEDICAL CENTER | Age: 47
End: 2017-08-11

## 2017-08-11 NOTE — TELEPHONE ENCOUNTER
Call from , he would like to have his FMLA extended for 8 weeks. FMLA updated and faxed please see media.

## 2017-08-29 ENCOUNTER — HOSPITAL ENCOUNTER (OUTPATIENT)
Dept: LAB | Facility: MEDICAL CENTER | Age: 47
End: 2017-08-29
Payer: COMMERCIAL

## 2017-08-29 LAB
BDY FAT % MEASURED: 37.6 %
BP DIAS: 79 MMHG
BP SYS: 159 MMHG
CHOLEST SERPL-MCNC: 206 MG/DL (ref 100–199)
DIABETES HTDIA: NO
EVENT NAME HTEVT: NORMAL
FASTING HTFAS: YES
GLUCOSE SERPL-MCNC: 102 MG/DL (ref 65–99)
HDLC SERPL-MCNC: 51 MG/DL
HYPERTENSION HTHYP: YES
LDLC SERPL CALC-MCNC: 109 MG/DL
SCREENING LOC CITY HTCIT: NORMAL
SCREENING LOC STATE HTSTA: NORMAL
SCREENING LOCATION HTLOC: NORMAL
SMOKING HTSMO: NO
SUBSCRIBER ID HTSID: NORMAL
TRIGL SERPL-MCNC: 228 MG/DL (ref 0–149)

## 2017-08-29 PROCEDURE — 36415 COLL VENOUS BLD VENIPUNCTURE: CPT

## 2017-08-29 PROCEDURE — S5190 WELLNESS ASSESSMENT BY NONPH: HCPCS

## 2017-08-29 PROCEDURE — 80061 LIPID PANEL: CPT

## 2017-08-29 PROCEDURE — 82947 ASSAY GLUCOSE BLOOD QUANT: CPT

## 2017-09-13 ENCOUNTER — EH NON-PROVIDER (OUTPATIENT)
Dept: OCCUPATIONAL MEDICINE | Facility: CLINIC | Age: 47
End: 2017-09-13

## 2017-09-13 DIAGNOSIS — Z29.89 NEED FOR ISOLATION: ICD-10-CM

## 2017-09-13 PROCEDURE — 94375 RESPIRATORY FLOW VOLUME LOOP: CPT

## 2017-09-22 ENCOUNTER — HOSPITAL ENCOUNTER (OUTPATIENT)
Facility: MEDICAL CENTER | Age: 47
End: 2017-09-22
Attending: FAMILY MEDICINE
Payer: COMMERCIAL

## 2017-09-22 ENCOUNTER — OFFICE VISIT (OUTPATIENT)
Dept: MEDICAL GROUP | Facility: CLINIC | Age: 47
End: 2017-09-22
Payer: COMMERCIAL

## 2017-09-22 VITALS
HEIGHT: 63 IN | TEMPERATURE: 98.2 F | BODY MASS INDEX: 27.85 KG/M2 | RESPIRATION RATE: 15 BRPM | HEART RATE: 62 BPM | DIASTOLIC BLOOD PRESSURE: 78 MMHG | OXYGEN SATURATION: 98 % | WEIGHT: 157.2 LBS | SYSTOLIC BLOOD PRESSURE: 132 MMHG

## 2017-09-22 DIAGNOSIS — Z23 NEED FOR IMMUNIZATION AGAINST INFLUENZA: ICD-10-CM

## 2017-09-22 DIAGNOSIS — Z01.419 WELL WOMAN EXAM WITH ROUTINE GYNECOLOGICAL EXAM: ICD-10-CM

## 2017-09-22 DIAGNOSIS — Z90.711: ICD-10-CM

## 2017-09-22 DIAGNOSIS — Z00.00 LABORATORY EXAMINATION ORDERED AS PART OF A ROUTINE GENERAL MEDICAL EXAMINATION: ICD-10-CM

## 2017-09-22 PROCEDURE — 90471 IMMUNIZATION ADMIN: CPT | Performed by: FAMILY MEDICINE

## 2017-09-22 PROCEDURE — 99000 SPECIMEN HANDLING OFFICE-LAB: CPT | Performed by: FAMILY MEDICINE

## 2017-09-22 PROCEDURE — 99396 PREV VISIT EST AGE 40-64: CPT | Mod: 25 | Performed by: FAMILY MEDICINE

## 2017-09-22 PROCEDURE — 90686 IIV4 VACC NO PRSV 0.5 ML IM: CPT | Performed by: FAMILY MEDICINE

## 2017-09-22 PROCEDURE — 88175 CYTOPATH C/V AUTO FLUID REDO: CPT

## 2017-09-22 PROCEDURE — 87624 HPV HI-RISK TYP POOLED RSLT: CPT

## 2017-09-22 ASSESSMENT — ENCOUNTER SYMPTOMS
DEPRESSION: 0
DOUBLE VISION: 0
ORTHOPNEA: 0
NAUSEA: 0
COUGH: 0
DIZZINESS: 0
CHILLS: 0
ABDOMINAL PAIN: 0
BLURRED VISION: 0
FEVER: 0
HEADACHES: 0
SORE THROAT: 0
NERVOUS/ANXIOUS: 0
SHORTNESS OF BREATH: 0
VOMITING: 0
MYALGIAS: 0
SENSORY CHANGE: 0
DIARRHEA: 0
SPUTUM PRODUCTION: 0
HEARTBURN: 0
BLOOD IN STOOL: 0
PALPITATIONS: 0
WEIGHT LOSS: 0
SPEECH CHANGE: 0

## 2017-09-22 ASSESSMENT — PATIENT HEALTH QUESTIONNAIRE - PHQ9: CLINICAL INTERPRETATION OF PHQ2 SCORE: 0

## 2017-09-22 NOTE — PROGRESS NOTES
Subjective:      Ines Soliz is a 47 y.o. female who presents with Annual Exam        She is here for her well woman examination with pap.  She had a partial hysterectomy six weeks ago, cervix retained.    HPI   has a past medical history of Choledocholithiasis with obstruction (6/2014); Dental disorder; Elevated blood pressure complicating pregnancy, antepartum (2002); Family history of diabetes mellitus; Family history of ischemic heart disease; Fibroid uterus; History of thyroidectomy, subtotal; and Hypertension (4/2012).   has a past surgical history that includes thyroidectomy (1997); ercp in or (6/15/2014); rolando by laparoscopy (6/16/2014); cholecystectomy (6/16/2014); abdominal hysterectomy total (7/11/2017); and salpingectomy (Bilateral, 7/11/2017).  family history includes Diabetes in her mother; Heart Disease (age of onset: 70) in her father; Hypertension in her father.   reports that she has never smoked. She has never used smokeless tobacco. She reports that she does not drink alcohol or use drugs.      Current Outpatient Prescriptions:   •  amlodipine (NORVASC) 5 MG Tab, TAKE 1 TAB BY MOUTH EVERY DAY., Disp: 90 Tab, Rfl: 2  •  losartan (COZAAR) 100 MG Tab, TAKE 1 TAB BY MOUTH EVERY DAY., Disp: 90 Tab, Rfl: 2  has No Known Allergies.    Review of Systems   Constitutional: Negative for chills, fever, malaise/fatigue and weight loss.   HENT: Negative for congestion, hearing loss and sore throat.    Eyes: Negative for blurred vision and double vision.   Respiratory: Negative for cough, sputum production and shortness of breath.    Cardiovascular: Negative for chest pain, palpitations, orthopnea and leg swelling.   Gastrointestinal: Negative for abdominal pain, blood in stool, diarrhea, heartburn, nausea and vomiting.   Genitourinary: Negative for dysuria, frequency and urgency.   Musculoskeletal: Negative for joint pain and myalgias.   Skin: Negative for rash.   Neurological: Negative for  "dizziness, sensory change, speech change and headaches.   Psychiatric/Behavioral: Negative for depression. The patient is not nervous/anxious.           Objective:     /78   Pulse 62   Temp 36.8 °C (98.2 °F)   Resp 15   Ht 1.6 m (5' 3\")   Wt 71.3 kg (157 lb 3.2 oz)   LMP 06/19/2017   SpO2 98%   Breastfeeding? No   BMI 27.85 kg/m²      Physical Exam   Constitutional: She is oriented to person, place, and time. She appears well-developed and well-nourished. No distress.   HENT:   Head: Normocephalic and atraumatic.   Mouth/Throat: Oropharynx is clear and moist.   Eyes: EOM are normal. Pupils are equal, round, and reactive to light. Left eye exhibits no discharge.   Neck: Normal range of motion. Neck supple. No JVD present. No thyromegaly present.   Cardiovascular: Normal rate, regular rhythm and normal heart sounds.    No murmur heard.  Pulmonary/Chest: Effort normal and breath sounds normal. No respiratory distress. She has no wheezes. She has no rales. Right breast exhibits no inverted nipple, no mass, no nipple discharge, no skin change and no tenderness. Left breast exhibits no inverted nipple, no mass, no nipple discharge, no skin change and no tenderness. Breasts are symmetrical.   Abdominal: Soft. Bowel sounds are normal. She exhibits no distension and no mass. There is no tenderness.   Genitourinary: Vagina normal. No breast tenderness, discharge or bleeding. Cervix exhibits no discharge. Right adnexum displays no mass. Left adnexum displays no mass. No vaginal discharge found.   Genitourinary Comments: Pap smear (brush and spatula) taken and sent.  Cervix is present, partial hysterectomy.   Musculoskeletal: Normal range of motion. She exhibits no edema.   Lymphadenopathy:     She has no cervical adenopathy.   Neurological: She is alert and oriented to person, place, and time. Coordination normal.   Skin: Skin is warm and dry. No rash noted. No erythema.   Psychiatric: She has a normal mood and " affect. Her behavior is normal.   Vitals reviewed.              Assessment/Plan:     1. Well woman exam with routine gynecological exam  She is generally well, feeling better after hysterectomy.  - THINPREP PAP WITH HPV; Future    2. Status post partial hysterectomy with remaining cervical stump  This was 6 weeks ago. Has had no bleeding or drainage. Is overdue for actual Pap.    3. Laboratory examination ordered as part of a routine general medical examination  Routine orders discussed and placed  - COMP METABOLIC PANEL; Future  - LIPID PROFILE; Future  - CBC WITHOUT DIFFERENTIAL; Future    4. Need for immunization against influenza  Administered today  - INFLUENZA VACCINE QUAD INJ >3Y(PF)

## 2017-09-23 LAB
CYTOLOGY REG CYTOL: NORMAL
HPV HR 12 DNA CVX QL NAA+PROBE: NEGATIVE
HPV16 DNA SPEC QL NAA+PROBE: NEGATIVE
HPV18 DNA SPEC QL NAA+PROBE: NEGATIVE
SPECIMEN SOURCE: NORMAL

## 2017-09-28 ENCOUNTER — TELEPHONE (OUTPATIENT)
Dept: OBGYN | Facility: MEDICAL CENTER | Age: 47
End: 2017-09-28

## 2017-09-28 NOTE — TELEPHONE ENCOUNTER
Pt called stating that her work is requiring a release for her to go back on 10/4. Pt would like us to e-mail the form to her at [email or web address]  Thank you

## 2017-11-16 ENCOUNTER — OCCUPATIONAL MEDICINE (OUTPATIENT)
Dept: URGENT CARE | Facility: CLINIC | Age: 47
End: 2017-11-16
Payer: COMMERCIAL

## 2017-11-16 VITALS
DIASTOLIC BLOOD PRESSURE: 82 MMHG | SYSTOLIC BLOOD PRESSURE: 142 MMHG | BODY MASS INDEX: 27.82 KG/M2 | HEIGHT: 63 IN | TEMPERATURE: 97.9 F | WEIGHT: 157 LBS | RESPIRATION RATE: 16 BRPM | OXYGEN SATURATION: 98 % | HEART RATE: 72 BPM

## 2017-11-16 DIAGNOSIS — Z02.1 PRE-EMPLOYMENT DRUG SCREENING: ICD-10-CM

## 2017-11-16 DIAGNOSIS — S05.01XA RIGHT CORNEAL ABRASION, INITIAL ENCOUNTER: ICD-10-CM

## 2017-11-16 LAB
AMP AMPHETAMINE: NORMAL
BAR BARBITURATES: NORMAL
BREATH ALCOHOL COMMENT: NORMAL
BZO BENZODIAZEPINES: NORMAL
COC COCAINE: NORMAL
INT CON NEG: NORMAL
INT CON POS: NORMAL
MDMA ECSTASY: NORMAL
MET METHAMPHETAMINES: NORMAL
MTD METHADONE: NORMAL
OPI OPIATES: NORMAL
OXY OXYCODONE: NORMAL
PCP PHENCYCLIDINE: NORMAL
POC BREATHALIZER: 0 PERCENT (ref 0–0.01)
POC URINE DRUG SCREEN OCDRS: NORMAL
THC: NORMAL

## 2017-11-16 PROCEDURE — 82075 ASSAY OF BREATH ETHANOL: CPT | Performed by: EMERGENCY MEDICINE

## 2017-11-16 PROCEDURE — 80305 DRUG TEST PRSMV DIR OPT OBS: CPT | Performed by: EMERGENCY MEDICINE

## 2017-11-16 PROCEDURE — 99202 OFFICE O/P NEW SF 15 MIN: CPT | Performed by: EMERGENCY MEDICINE

## 2017-11-16 RX ORDER — ERYTHROMYCIN 5 MG/G
1 OINTMENT OPHTHALMIC 3 TIMES DAILY
Qty: 1 TUBE | Refills: 0 | Status: SHIPPED | OUTPATIENT
Start: 2017-11-16 | End: 2017-11-16 | Stop reason: SDUPTHER

## 2017-11-16 RX ORDER — ERYTHROMYCIN 5 MG/G
1 OINTMENT OPHTHALMIC 3 TIMES DAILY
Qty: 1 TUBE | Refills: 0 | Status: SHIPPED | OUTPATIENT
Start: 2017-11-16 | End: 2018-09-28

## 2017-11-16 RX ORDER — IBUPROFEN 200 MG
400 TABLET ORAL ONCE
Status: COMPLETED | OUTPATIENT
Start: 2017-11-16 | End: 2017-11-16

## 2017-11-16 RX ADMIN — Medication 400 MG: at 13:30

## 2017-11-16 ASSESSMENT — ENCOUNTER SYMPTOMS: DOUBLE VISION: 0

## 2017-11-16 NOTE — PATIENT INSTRUCTIONS
Recheck in 1 day.    Corneal Abrasion  The cornea is the clear covering at the front and center of the eye. When you look at the colored portion of the eye, you are looking through the cornea. It is a thin tissue made up of layers. The top layer is the most sensitive layer. A corneal abrasion happens if this layer is scratched or an injury causes it to come off.   HOME CARE  · You may be given drops or a medicated cream. Use the medicine as told by your doctor.  · A pressure patch may be put over the eye. If this is done, follow your doctor's instructions for when to remove the patch. Do not drive or use machines while the eye patch is on. Judging distances is hard to do with a patch on.  · See your doctor for a follow-up exam if you are told to do so. It is very important that you keep this appointment.  GET HELP IF:   · You have pain, are sensitive to light, and have a scratchy feeling in one eye or both eyes.  · Your pressure patch keeps getting loose. You can blink your eye under the patch.  · You have fluid coming from your eye or the lids stick together in the morning.  · You have the same symptoms in the morning that you did with the first abrasion. This could be days, weeks, or months after the first abrasion healed.  MAKE SURE YOU:   · Understand these instructions.  · Will watch your condition.  · Will get help right away if you are not doing well or get worse.     This information is not intended to replace advice given to you by your health care provider. Make sure you discuss any questions you have with your health care provider.     Document Released: 06/05/2009 Document Revised: 10/08/2014 Document Reviewed: 08/25/2014  [employer] Interactive Patient Education ©2016 [employer]

## 2017-11-16 NOTE — PROGRESS NOTES
"Subjective:      Ines Soliz is a 47 y.o. female who presents with Eye Burn (Right eye/burning/redness s/p mopping and felt \"a drop of something splash into eye\" x 30 mins ago)      Date of injury: 11/16/2017. Injured at work: yes; while using mop suddenly felt something in the right eye. No specific chemical or solvent exposure; irrigated eye at eyewash station immediately afterwards. She notes persisting foreign body sensation right eye. Previous injury: none. Second job: none. Outside activity: none. Contributing medical illness: none. Severity: moderate.  Location: right eye only. Tetanus up to date.     HPI    Review of Systems   Eyes: Negative for double vision.   Skin: Negative for itching and rash.     PMH:  has a past medical history of Choledocholithiasis with obstruction (6/2014); Dental disorder; Elevated blood pressure complicating pregnancy, antepartum (2002); Family history of diabetes mellitus; Family history of ischemic heart disease; Fibroid uterus; History of thyroidectomy, subtotal; and Hypertension (4/2012).  MEDS:   Current Outpatient Prescriptions:   •  erythromycin 5 MG/GM Ointment, Place 1 Inch in right eye 3 times a day., Disp: 1 Tube, Rfl: 0  •  amlodipine (NORVASC) 5 MG Tab, TAKE 1 TAB BY MOUTH EVERY DAY., Disp: 90 Tab, Rfl: 2  •  losartan (COZAAR) 100 MG Tab, TAKE 1 TAB BY MOUTH EVERY DAY., Disp: 90 Tab, Rfl: 2  ALLERGIES: No Known Allergies  SURGHX:   Past Surgical History:   Procedure Laterality Date   • ABDOMINAL HYSTERECTOMY TOTAL  7/11/2017    Procedure: ABDOMINAL HYSTERECTOMY TOTAL - SUPRACERVICAL ;  Surgeon: Salena Eisenberg M.D.;  Location: SURGERY SAME DAY [facility]S;  Service:    • SALPINGECTOMY Bilateral 7/11/2017    Procedure: SALPINGECTOMY;  Surgeon: Salena Eisenberg M.D.;  Location: SURGERY SAME DAY [facility] ORS;  Service:    • MARKY BY LAPAROSCOPY  6/16/2014    Performed by Raghu Mata M.D. at SURGERY SAME DAY [facility] ORS   • " "CHOLECYSTECTOMY  6/16/2014    Performed by Raghu Mata M.D. at SURGERY SAME DAY [facility] ORS   • ERCP IN OR  6/15/2014    Performed by Raymon Mena M.D. at SURGERY [facility] ORS   • THYROIDECTOMY  1997     SOCHX:  reports that she has never smoked. She has never used smokeless tobacco. She reports that she does not drink alcohol or use drugs.  FH: family history includes Diabetes in her mother; Heart Disease (age of onset: 70) in her father; Hypertension in her father.       Objective:     /82   Pulse 72   Temp 36.6 °C (97.9 °F)   Resp 16   Ht 1.6 m (5' 3\")   Wt 71.2 kg (157 lb)   SpO2 98%   Breastfeeding? No   BMI 27.81 kg/m²      Physical Exam    Gen.: Alert, cooperative, mild distress. Face: No swelling or rash. Eyes: Pupils equal round and reactive to light and accommodation, extraocular movement intact. Right eye-diffuse bulbar and palpebral conjunctival injection. Eyelids everted, no foreign body noted, swabbed. Right lateral mid corneal fluorescein uptake without foreign body noted.    Proparacaine topical 3 gtts OD used for analgesia.  Assessment/Plan:     1. Right corneal abrasion, initial encounter  OTC analgesia.  Recheck 24 hours.  - erythromycin 5 MG/GM Ointment; Place 1 Inch in right eye 3 times a day.  Dispense: 1 Tube; Refill: 0    2. Pre-employment drug screening  - POCT Breath Alcohol Test  - POCT 11 Panel Urine Drug Screen      "

## 2017-11-16 NOTE — LETTER
"EMPLOYEE’S CLAIM FOR COMPENSATION/ REPORT OF INITIAL TREATMENT  FORM C-4    EMPLOYEE’S CLAIM - PROVIDE ALL INFORMATION REQUESTED   First Name  Ines Last Name  Martínez Birthdate                    1970                Sex  female Claim Number   Home Address  [address]   Age  47 y.o. 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 Millard   Address or Location of Accident (if applicable)  [[address]   What were you doing at the time of accident? (if applicable)  cleaning room    How did this injury or occupational disease occur? (Be specific an answer in detail. Use additional sheet if necessary)  I was cleaning the room and moping the floor + I felt something my right eye.  NA   If you believe that you have an occupational disease, when did you first have knowledge of the disability and it relationship to your employment?  NA Witnesses to the Accident  NA      Nature of Injury or Occupational Disease  Foreign Body  Part(s) of Body Injured or Affected  Eye (R), ,     I certify that the above is true and correct to the best of my knowledge and that I have provided this information in order to obtain the benefits of [place]  I hereby authorize any physician, chiropractor, surgeon, practitioner, or other person, any hospital, including [facility] hospital, any medical service organization, any insurance company, or other institution or organization to release to each other, any medical or other information, including benefits paid or payable, pertinent to this injury or disease, except information relative to diagnosis, treatment and/or counseling for AIDS, psychological conditions, alcohol or controlled substances, for which I must give specific authorization.  A Photostat of this authorization shall be as valid as the original.     Date   Place   Employee’s Signature   THIS REPORT MUST BE COMPLETED AND MAILED WITHIN 3 WORKING DAYS OF TREATMENT   Place  [facility]  Name of [facility]   Date  11/16/2017 Diagnosis  (S05.01XA) Right corneal abrasion, initial encounter  (Z02.1) Pre-employment drug screening Is there evidence the injured employee was under the influence of alcohol and/or another controlled substance at the time of accident?   Hour  12:33 PM Description of Injury or Disease  Diagnoses of Right corneal abrasion, initial encounter and Pre-employment drug screening were pertinent to this visit. No   Treatment  Antibiotic ointment, OTC ibuprofen.  Have you advised the patient to remain off work five days or more? No   X-Ray Findings      If Yes   From Date  To Date      From information given by the employee, together with medical evidence, can you directly connect this injury or occupational disease as job incurred?  Yes If No Full Duty  Yes Modified Duty      Is additional medical care by a physician indicated?  Yes If Modified Duty, Specify any Limitations / Restrictions      Do you know of any previous injury or disease contributing to this condition or occupational disease?                            No  "  Date  11/16/2017 Print Doctor’s Name Master Anna M.D. I certify the employer’s copy of  this form was mailed on:   Address  [address] Insurer’s Use Only     [place]  [address]    Provider’s Tax ID Number  [id number] Telephone  Dept: [phone]        alisha-MASTER Samuel M.D.   e-Signature: Dr. Kevin Mo, Medical Director Degree  MD        ORIGINAL-TREATING PHYSICIAN OR CHIROPRACTOR    PAGE 2-INSURER/TPA    PAGE 3-EMPLOYER    PAGE 4-EMPLOYEE             Form C-4 (rev10/07)              BRIEF DESCRIPTION OF RIGHTS AND BENEFITS  (Pursuant to [place] 616C.[place])    Notice of Injury or Occupational Disease (Incident Report Form C-1): If an injury or occupational disease (OD) arises out of and in the  course of employment, you must provide written notice to your employer as soon as practicable, but no later than 7 days after the accident or  OD. Your employer shall maintain a sufficient supply of the required forms.    Claim for Compensation (Form C-4): If medical treatment is sought, the form C-4 is available at the place of initial treatment. A completed  \"Claim for Compensation\" (Form C-4) must be filed within 90 days after an accident or OD. The treating physician or chiropractor must,  within 3 working days after treatment, complete and mail to the employer, the employer's insurer and third-party , the Claim for  Compensation.    Medical Treatment: If you require medical treatment for your on-the-job injury or OD, you may be required to select a physician or  chiropractor from a list provided by your workers’ compensation insurer, if it has contracted with an Organization for Managed Care (MCO) or  Preferred Provider Organization (PPO) or providers of health care. If your employer has not entered into a contract with an MCO or PPO, you  may select a physician or chiropractor from the Panel of Physicians and Chiropractors. Any medical costs related to your industrial " injury or  OD will be paid by your insurer.    Temporary Total Disability (TTD): If your doctor has certified that you are unable to work for a period of at least 5 consecutive days, or 5  cumulative days in a 20-day period, or places restrictions on you that your employer does not accommodate, you may be entitled to TTD  compensation.    Temporary Partial Disability (TPD): If the wage you receive upon reemployment is less than the compensation for TTD to which you are  entitled, the insurer may be required to pay you TPD compensation to make up the difference. TPD can only be paid for a maximum of 24  months.    Permanent Partial Disability (PPD): When your medical condition is stable and there is an indication of a PPD as a result of your injury or  OD, within 30 days, your insurer must arrange for an evaluation by a rating physician or chiropractor to determine the degree of your PPD. The  amount of your PPD award depends on the date of injury, the results of the PPD evaluation and your age and wage.    Permanent Total Disability (PTD): If you are medically certified by a treating physician or chiropractor as permanently and totally disabled  and have been granted a PTD status by your insurer, you are entitled to receive monthly benefits not to exceed 66 2/3% of your average  monthly wage. The amount of your PTD payments is subject to reduction if you previously received a PPD award.    Vocational Rehabilitation Services: You may be eligible for vocational rehabilitation services if you are unable to return to the job due to a  permanent physical impairment or permanent restrictions as a result of your injury or occupational disease.    Transportation and Per Ari Reimbursement: You may be eligible for travel expenses and per ari associated with medical treatment.    Reopening: You may be able to reopen your claim if your condition worsens after claim closure.    Appeal Process: If you disagree with a written  determination issued by the insurer or the insurer does not respond to your request, you may  appeal to the Department of Administration, , by following the instructions contained in your determination letter. You must  appeal the determination within 70 days from the date of the determination letter at [address], [address], [place], [place]  [address], or [address], [address], [place], [place] [address]. If you disagree with the  decision, you may appeal to the  Department of Administration, . You must file your appeal within 30 days from the date of the  decision  letter at [address], [address], [place], [place] [address], or [address], [address], [place], [place] [address]. If you  disagree with a decision of an , you may file a petition for judicial review with the District Court. You must do so within 30  days of the Appeal Officer’s decision. You may be represented by an  at your own expense or you may contact the [facility] for possible  representation.    [place]  for Injured Workers (NAIW): If you disagree with a  decision, you may request that NAIW represent you  without charge at an  Hearing. For information regarding denial of benefits, you may contact the [facility] at: [address], [address], [place] [address], [phone], or [address], [address], [place] [address], [phone]    To File a Complaint with the Division: If you wish to file a complaint with the  of the Division of Industrial Relations (DIR),  please contact the Workers’ Compensation Section, [address] [place], [place], [place] [address], telephone [phone], or  [address], [address], [place] [address], telephone [phone].    For assistance with Workers’ Compensation Issues: you may contact the  Office of the Governor Consumer Health Assistance, [address] [place] [address], [address], [place] [address], Toll Free 1-605.760.1150, Web site: http://govcha.[facility].[email or web address]., E-mail  Ruth [email or web address].                                                                                                                                                                                                                                   __________________________________________________________________                                                                   _________________                Employee Name / Signature                                                                                                                                                       Date                                                                                                                                                                                                     D-2 (rev. 10/07)

## 2017-11-16 NOTE — LETTER
[facility] [address] [place] [address]  Phone:  [phone] - Fax:  [phone]   [facility] Progress Report and Disability Certification  Date of Service: 11/16/2017   No Show:  No  Date / Time of Next Visit:     Claim Information   Patient Name: Ines Soliz  Claim Number:     Employer: [employer]  Date of Injury: 11/16/2017     Insurer / TPA: Workers Choice  ID / SSN:     Occupation: Housekeeping/EVS  Diagnosis: Diagnoses of Right corneal abrasion, initial encounter and Pre-employment drug screening were pertinent to this visit.    Medical Information   Related to Industrial Injury? Yes    Subjective Complaints:  Date of injury: 11/16/2017. Injured at work: yes; while using mop suddenly felt something in the right eye. No specific chemical or solvent exposure; irrigated eye at eyewash station immediately afterwards. She notes persisting foreign body sensation right eye. Previous injury: none. Second job: none. Outside activity: none. Contributing medical illness: none. Severity: moderate.  Location: right eye only. Tetanus up to date.   Objective Findings: Gen.: Alert, cooperative, mild distress. Face: No swelling or rash. Eyes: Pupils equal round and reactive to light and accommodation, extraocular movement intact. Right eye-diffuse bulbar and palpebral conjunctival injection. Eyelids everted, no foreign body noted, swabbed. Right lateral mid corneal fluorescein uptake without foreign body noted.   Pre-Existing Condition(s):     Assessment:   Initial Visit    Status: Additional Care Required  Permanent Disability:No    Plan: Medication    Diagnostics:      Comments:  Patient notes that she feels capable of full duty at this time.    Disability Information   Status: Released to Full Duty    From:     Through:   Restrictions are:    Physical Restrictions   Sitting:    Standing:    Stooping:    Bending:      Squatting:    Walking:    Climbing:   Pushing:      Pulling:    Other:    Reaching Above Shoulder (L):   Reaching Above Shoulder (R):       Reaching Below Shoulder (L):    Reaching Below Shoulder (R):      Not to exceed Weight Limits   Carrying(hrs):   Weight Limit(lb):   Lifting(hrs):   Weight  Limit(lb):     Comments: Plan recheck in 24 hours.    Repetitive Actions   Hands: i.e. Fine Manipulations from Grasping:     Feet: i.e. Operating Foot Controls:     Driving / Operate Machinery:     Physician Name: Master Anna M.D. Physician Signature: MASTER Henriquez M.D. e-Signature: Dr. Kevin Mo, Medical Director   Clinic Name / Location: [address] [place] [address], [place] [address] Clinic Phone Number: Dept: [phone]   Appointment Time: 12:15 Pm Visit Start Time: 12:33 PM   Check-In Time:  12:28 Pm Visit Discharge Time:  1107am   Original-Treating Physician or Chiropractor    Page 2-Insurer/TPA    Page 3-Employer    Page 4-Employee

## 2017-11-17 ENCOUNTER — OCCUPATIONAL MEDICINE (OUTPATIENT)
Dept: URGENT CARE | Facility: CLINIC | Age: 47
End: 2017-11-17
Payer: COMMERCIAL

## 2017-11-17 VITALS
RESPIRATION RATE: 15 BRPM | DIASTOLIC BLOOD PRESSURE: 62 MMHG | BODY MASS INDEX: 27.82 KG/M2 | OXYGEN SATURATION: 98 % | HEART RATE: 67 BPM | HEIGHT: 63 IN | SYSTOLIC BLOOD PRESSURE: 122 MMHG | WEIGHT: 157 LBS | TEMPERATURE: 97.8 F

## 2017-11-17 DIAGNOSIS — S05.01XD ABRASION OF RIGHT CORNEA, SUBSEQUENT ENCOUNTER: ICD-10-CM

## 2017-11-17 PROCEDURE — 99213 OFFICE O/P EST LOW 20 MIN: CPT | Mod: 29 | Performed by: NURSE PRACTITIONER

## 2017-11-17 ASSESSMENT — ENCOUNTER SYMPTOMS
EYE REDNESS: 1
EYE PAIN: 1

## 2017-11-17 NOTE — PROGRESS NOTES
"Subjective:      Ines Soliz is a 47 y.o. female who presents with Follow-Up (right eye follow up s/p workers comp)      Date of injury: 11/16/2017. Injured at work: yes; while using mop suddenly felt something in the right eye. No specific chemical or solvent exposure; irrigated eye at eyewash station immediately afterwards. Patient was seen in  and corneal abrasion was diagnosed after exam. Patient was placed on erythromycin ophthalmic ointment and was advised to return today for 24 hour follow up.  Patient states today she has increased swelling and pain around the eye.      Other   This is a new problem. The current episode started yesterday. The problem occurs constantly. The problem has been unchanged. Associated symptoms comments: Eye pain  Intermittent blurred vision. . Nothing aggravates the symptoms. She has tried nothing for the symptoms. The treatment provided no relief.       Review of Systems   Eyes: Positive for pain and redness.   All other systems reviewed and are negative.         Objective:     /62   Pulse 67   Temp 36.6 °C (97.8 °F)   Resp 15   Ht 1.6 m (5' 3\")   Wt 71.2 kg (157 lb)   SpO2 98%   Breastfeeding? No   BMI 27.81 kg/m²      Physical Exam   Constitutional: She appears well-developed and well-nourished. No distress.   Eyes:       Skin: Skin is warm and dry. Capillary refill takes less than 2 seconds. She is not diaphoretic.   Psychiatric: She has a normal mood and affect. Her behavior is normal.   Vitals reviewed.      Right eye is red and injected with periorbital swelling.  On exam with stain, a large corneal abrasion persists to the 11:00 position of the left eye; this does not appear to be ulcerated at this time.     As patient has been on treatment for 24 hours and is still having significant symptoms including pain and a large abrasion persists I have referred her on for urgent ophthalmology consult which will be done this afternoon at [facility]. REferral placed.      Assessment/Plan:     1. Abrasion of right cornea, subsequent encounter    - REFERRAL TO OPHTHALMOLOGY  -See D39 for restrictions

## 2017-11-17 NOTE — LETTER
[facility] [place] [address] [place] [address]  Phone:  [phone] - Fax:  [phone]   [facility] Progress Report and Disability Certification  Date of Service: 11/17/2017   No Show:  No  Date / Time of Next Visit:     Claim Information   Patient Name: Ines Soliz  Claim Number:     Employer: [employer]  Date of Injury: 11/16/2017     Insurer / TPA: Workers [employer]  ID / SSN:     Occupation: Housekeeping/EVS  Diagnosis: The encounter diagnosis was Abrasion of right cornea, subsequent encounter.    Medical Information   Related to Industrial Injury? Yes    Subjective Complaints:  Date of injury: 11/16/2017. Injured at work: yes; while using mop suddenly felt something in the right eye. No specific chemical or solvent exposure; irrigated eye at eyewash station immediately afterwards. Patient was seen in  and corneal abrasion was diagnosed after exam. Patient was placed on erythromycin ophthalmic ointment and was advised to return today for 24 hour follow up.  Patient states today she has increased swelling and pain around the eye.    Objective Findings: Right eye is red and injected with periorbital swelling.  On exam with stain, a large corneal abrasion persists to the 11:00 position of the left eye; this does not appear to be ulcerated at this time.    Pre-Existing Condition(s):     Assessment:   Condition Same    Status: Discharged / Care Transfer  Permanent Disability:No    Plan: Transfer Care    Diagnostics:      Comments:       Disability Information   Status: Released to Full Duty    From:     Through:   Restrictions are:     Physical Restrictions   Sitting:    Standing:    Stooping:    Bending:      Squatting:    Walking:    Climbing:    Pushing:      Pulling:    Other:    Reaching Above Shoulder (L):   Reaching Above Shoulder (R):       Reaching Below Shoulder (L):    Reaching Below Shoulder (R):      Not to exceed Weight Limits   Carrying(hrs):    Weight Limit(lb):   Lifting(hrs):   Weight  Limit(lb):     Comments: Significant corneal abrasion persists to the 11:00 position of the right eye. At this time [facility] as patient's symptoms have been persistent and are not clearly improving today.     Repetitive Actions   Hands: i.e. Fine Manipulations from Grasping:     Feet: i.e. Operating Foot Controls:     Driving / Operate Machinery:     Physician Name: Cathey J Hamman, A.P.N. Physician Signature: e-SignHAMMAN, CATHEY J A.P.N. e-Signature: Dr. Kevin Mo, Medical Director   Clinic Name / Location: [address] [place] [address], [place] [address] Clinic Phone Number: Dept: [phone]   Appointment Time: 1:30 Pm Visit Start Time: 1:32 PM   Check-In Time:  1:28 Pm Visit Discharge Time: 2:11 PM   Original-Treating Physician or Chiropractor    Page 2-Insurer/TPA    Page 3-Employer    Page 4-Employee

## 2018-02-11 DIAGNOSIS — I10 ESSENTIAL HYPERTENSION: ICD-10-CM

## 2018-02-11 RX ORDER — AMLODIPINE BESYLATE 5 MG/1
5 TABLET ORAL DAILY
Qty: 90 TAB | Refills: 2 | Status: SHIPPED | OUTPATIENT
Start: 2018-02-11 | End: 2018-11-08 | Stop reason: SDUPTHER

## 2018-02-11 RX ORDER — LOSARTAN POTASSIUM 100 MG/1
TABLET ORAL
Qty: 90 TAB | Refills: 2 | Status: SHIPPED | OUTPATIENT
Start: 2018-02-11 | End: 2018-11-08 | Stop reason: SDUPTHER

## 2018-02-23 ENCOUNTER — HOSPITAL ENCOUNTER (OUTPATIENT)
Dept: RADIOLOGY | Facility: MEDICAL CENTER | Age: 48
End: 2018-02-23
Attending: FAMILY MEDICINE
Payer: COMMERCIAL

## 2018-02-23 DIAGNOSIS — Z12.31 ENCOUNTER FOR SCREENING MAMMOGRAM FOR MALIGNANT NEOPLASM OF BREAST: ICD-10-CM

## 2018-02-23 PROCEDURE — 77067 SCR MAMMO BI INCL CAD: CPT

## 2018-09-06 ENCOUNTER — DOCUMENTATION (OUTPATIENT)
Dept: OCCUPATIONAL MEDICINE | Facility: CLINIC | Age: 48
End: 2018-09-06

## 2018-09-14 ENCOUNTER — HOSPITAL ENCOUNTER (OUTPATIENT)
Dept: LAB | Facility: MEDICAL CENTER | Age: 48
End: 2018-09-14
Payer: COMMERCIAL

## 2018-09-14 LAB
BDY FAT % MEASURED: 36.6 %
BP DIAS: 63 MMHG
BP SYS: 138 MMHG
CHOLEST SERPL-MCNC: 200 MG/DL (ref 100–199)
DIABETES HTDIA: NO
EVENT NAME HTEVT: NORMAL
FASTING HTFAS: YES
GLUCOSE SERPL-MCNC: 111 MG/DL (ref 65–99)
HDLC SERPL-MCNC: 57 MG/DL
HYPERTENSION HTHYP: YES
LDLC SERPL CALC-MCNC: 119 MG/DL
SCREENING LOC CITY HTCIT: NORMAL
SCREENING LOC STATE HTSTA: NORMAL
SCREENING LOCATION HTLOC: NORMAL
SMOKING HTSMO: NO
SUBSCRIBER ID HTSID: NORMAL
TRIGL SERPL-MCNC: 122 MG/DL (ref 0–149)

## 2018-09-14 PROCEDURE — S5190 WELLNESS ASSESSMENT BY NONPH: HCPCS

## 2018-09-14 PROCEDURE — 82947 ASSAY GLUCOSE BLOOD QUANT: CPT

## 2018-09-14 PROCEDURE — 36415 COLL VENOUS BLD VENIPUNCTURE: CPT

## 2018-09-14 PROCEDURE — 80061 LIPID PANEL: CPT

## 2018-09-18 ENCOUNTER — EH NON-PROVIDER (OUTPATIENT)
Dept: OCCUPATIONAL MEDICINE | Facility: CLINIC | Age: 48
End: 2018-09-18

## 2018-09-18 DIAGNOSIS — Z02.89 ENCOUNTER FOR OCCUPATIONAL HEALTH EXAMINATION INVOLVING RESPIRATOR: Primary | ICD-10-CM

## 2018-09-18 PROCEDURE — 94375 RESPIRATORY FLOW VOLUME LOOP: CPT | Performed by: PREVENTIVE MEDICINE

## 2018-09-19 ENCOUNTER — IMMUNIZATION (OUTPATIENT)
Dept: OCCUPATIONAL MEDICINE | Facility: CLINIC | Age: 48
End: 2018-09-19

## 2018-09-19 DIAGNOSIS — Z23 NEED FOR VACCINATION: ICD-10-CM

## 2018-09-19 PROCEDURE — 90686 IIV4 VACC NO PRSV 0.5 ML IM: CPT | Performed by: PREVENTIVE MEDICINE

## 2018-09-28 ENCOUNTER — OFFICE VISIT (OUTPATIENT)
Dept: MEDICAL GROUP | Facility: MEDICAL CENTER | Age: 48
End: 2018-09-28
Payer: COMMERCIAL

## 2018-09-28 VITALS
RESPIRATION RATE: 16 BRPM | WEIGHT: 152 LBS | TEMPERATURE: 98.2 F | HEIGHT: 63 IN | BODY MASS INDEX: 26.93 KG/M2 | DIASTOLIC BLOOD PRESSURE: 78 MMHG | HEART RATE: 62 BPM | SYSTOLIC BLOOD PRESSURE: 120 MMHG | OXYGEN SATURATION: 97 %

## 2018-09-28 DIAGNOSIS — Z00.00 LABORATORY EXAMINATION ORDERED AS PART OF A ROUTINE GENERAL MEDICAL EXAMINATION: ICD-10-CM

## 2018-09-28 DIAGNOSIS — R73.01 ELEVATED FASTING GLUCOSE: ICD-10-CM

## 2018-09-28 DIAGNOSIS — Z00.00 ROUTINE GENERAL MEDICAL EXAMINATION AT A HEALTH CARE FACILITY: ICD-10-CM

## 2018-09-28 PROCEDURE — 99396 PREV VISIT EST AGE 40-64: CPT | Performed by: FAMILY MEDICINE

## 2018-09-28 ASSESSMENT — ENCOUNTER SYMPTOMS
SENSORY CHANGE: 0
ORTHOPNEA: 0
MYALGIAS: 0
SPEECH CHANGE: 0
LOSS OF CONSCIOUSNESS: 0
WHEEZING: 0
VOMITING: 0
ABDOMINAL PAIN: 0
HEADACHES: 0
NECK PAIN: 0
SEIZURES: 0
FEVER: 0
TREMORS: 0
DIAPHORESIS: 0
BACK PAIN: 0
SHORTNESS OF BREATH: 0
CHILLS: 0
DEPRESSION: 0
BLURRED VISION: 0
DOUBLE VISION: 0
NERVOUS/ANXIOUS: 0
BLOOD IN STOOL: 0
HALLUCINATIONS: 0
DIZZINESS: 0
FOCAL WEAKNESS: 0
COUGH: 0
INSOMNIA: 0
NAUSEA: 0
DIARRHEA: 0
BRUISES/BLEEDS EASILY: 0
PND: 0
SPUTUM PRODUCTION: 0
MEMORY LOSS: 0
PALPITATIONS: 0
WEIGHT LOSS: 0
TINGLING: 0
SORE THROAT: 0
HEARTBURN: 0

## 2018-09-28 ASSESSMENT — LIFESTYLE VARIABLES: SUBSTANCE_ABUSE: 0

## 2018-09-28 NOTE — PROGRESS NOTES
Subjective:      Ines Soliz is a 48 y.o. female who presents with Annual Exam        She is here for her general medical examination.    HPI     has a past medical history of Choledocholithiasis with obstruction (6/2014); Dental disorder; Elevated blood pressure complicating pregnancy, antepartum (2002); Family history of diabetes mellitus; Family history of diabetes mellitus in mother; Family history of ischemic heart disease; Fibroid uterus; History of thyroidectomy, subtotal; and Hypertension (4/2012).   has a past surgical history that includes thyroidectomy (1997); ercp in or (6/15/2014); rolando by laparoscopy (6/16/2014); cholecystectomy (6/16/2014); abdominal hysterectomy total (7/11/2017); and salpingectomy (Bilateral, 7/11/2017).  family history includes Diabetes in her mother; Heart Disease (age of onset: 70) in her father; Hypertension in her father.   reports that she has never smoked. She has never used smokeless tobacco. She reports that she does not drink alcohol or use drugs.      Current Outpatient Prescriptions:   •  amLODIPine (NORVASC) 5 MG Tab, Take 1 Tab by mouth every day., Disp: 90 Tab, Rfl: 2  •  losartan (COZAAR) 100 MG Tab, TAKE 1 TAB BY MOUTH EVERY DAY., Disp: 90 Tab, Rfl: 2  has No Known Allergies.    Review of Systems   Constitutional: Negative for chills, diaphoresis, fever, malaise/fatigue and weight loss.   HENT: Negative for congestion, hearing loss, sore throat and tinnitus.    Eyes: Negative for blurred vision and double vision.        Recent eye examination normal   Respiratory: Negative for cough, sputum production, shortness of breath and wheezing.    Cardiovascular: Negative for chest pain, palpitations, orthopnea, leg swelling and PND.   Gastrointestinal: Negative for abdominal pain, blood in stool, diarrhea, heartburn, nausea and vomiting.   Genitourinary: Negative for dysuria, frequency, hematuria and urgency.   Musculoskeletal: Negative for back pain, joint pain,  "myalgias and neck pain.   Skin: Negative for rash.   Neurological: Negative for dizziness, tingling, tremors, sensory change, speech change, focal weakness, seizures, loss of consciousness and headaches.   Endo/Heme/Allergies: Negative for environmental allergies. Does not bruise/bleed easily.   Psychiatric/Behavioral: Negative for depression, hallucinations, memory loss, substance abuse and suicidal ideas. The patient is not nervous/anxious and does not have insomnia.           Objective:     /78 (BP Location: Right arm, Patient Position: Sitting)   Pulse 62   Temp 36.8 °C (98.2 °F)   Resp 16   Ht 1.6 m (5' 3\")   Wt 68.9 kg (152 lb)   SpO2 97%   BMI 26.93 kg/m²      Physical Exam   Constitutional: She is oriented to person, place, and time. She appears well-developed and well-nourished.   HENT:   Head: Normocephalic and atraumatic.   Mouth/Throat: Oropharynx is clear and moist.   Eyes: Pupils are equal, round, and reactive to light. EOM are normal. Left eye exhibits no discharge.   Neck: Normal range of motion. Neck supple. No JVD present. No thyromegaly present.   Cardiovascular: Normal rate, regular rhythm and normal heart sounds.    No murmur heard.  Pulmonary/Chest: Effort normal and breath sounds normal. No respiratory distress. She has no wheezes. She has no rales.   Abdominal: Soft. Bowel sounds are normal. She exhibits no distension and no mass. There is no tenderness.   Musculoskeletal: Normal range of motion. She exhibits no edema.   Lymphadenopathy:     She has no cervical adenopathy.   Neurological: She is alert and oriented to person, place, and time. Coordination normal.   Skin: Skin is warm and dry. No rash noted. No erythema.   Psychiatric: She has a normal mood and affect. Her behavior is normal.   Vitals reviewed.       9/14/18 lab results from biometric screening reviewed and discussed.  Lipids were a little higher but still very favorable.  Elevated glucose at 111 fasting.     "   Assessment/Plan:     1. Routine general medical examination at a health care facility  She is generally well.  Medical problems stable.    2. Laboratory examination ordered as part of a routine general medical examination  Routine orders discussed and placed  - COMP METABOLIC PANEL; Future  - LIPID PROFILE; Future  - CBC WITHOUT DIFFERENTIAL; Future    3. Elevated fasting glucose  Routine check with recent elevated glucose  - HEMOGLOBIN A1C; Future

## 2018-11-08 DIAGNOSIS — I10 ESSENTIAL HYPERTENSION: ICD-10-CM

## 2018-11-08 RX ORDER — LOSARTAN POTASSIUM 100 MG/1
TABLET ORAL
Qty: 90 TAB | Refills: 2 | Status: SHIPPED | OUTPATIENT
Start: 2018-11-08 | End: 2019-11-26

## 2018-11-08 RX ORDER — AMLODIPINE BESYLATE 5 MG/1
TABLET ORAL
Qty: 90 TAB | Refills: 2 | Status: SHIPPED | OUTPATIENT
Start: 2018-11-08 | End: 2019-12-07 | Stop reason: SDUPTHER

## 2018-11-09 ENCOUNTER — OFFICE VISIT (OUTPATIENT)
Dept: MEDICAL GROUP | Facility: MEDICAL CENTER | Age: 48
End: 2018-11-09
Payer: COMMERCIAL

## 2018-11-09 VITALS
TEMPERATURE: 98.6 F | HEIGHT: 63 IN | SYSTOLIC BLOOD PRESSURE: 102 MMHG | HEART RATE: 62 BPM | DIASTOLIC BLOOD PRESSURE: 78 MMHG | OXYGEN SATURATION: 100 % | BODY MASS INDEX: 26.75 KG/M2 | RESPIRATION RATE: 12 BRPM | WEIGHT: 151 LBS

## 2018-11-09 DIAGNOSIS — N64.4 PAIN OF LEFT BREAST: ICD-10-CM

## 2018-11-09 PROCEDURE — 99213 OFFICE O/P EST LOW 20 MIN: CPT | Performed by: FAMILY MEDICINE

## 2018-11-09 ASSESSMENT — PATIENT HEALTH QUESTIONNAIRE - PHQ9: CLINICAL INTERPRETATION OF PHQ2 SCORE: 0

## 2018-11-09 NOTE — PROGRESS NOTES
"Chief Complaint   Patient presents with   • Breast Pain     left breast pain       Subjective:     HPI:   Ines Soliz presents today with the followin. Pain of left breast  Patient has had a week of persistent left breast pain.  This is worse if she leans on the area or when bending over.  Denies any trauma to the area.  Feels there is some fullness.  Has not been able to feel a mass.  No mass felt on examination today.  No dimpling of the skin appreciated.  Patient has a strong feeling that something is wrong.  Diagnostic mammogram order and ultrasound order discussed and placed.        Patient Active Problem List    Diagnosis Date Noted   • Elevated fasting glucose 2018   • Status post partial hysterectomy with remaining cervical stump 2017   • Essential hypertension 2012   • History of iron deficiency anemia 2011   • History of thyroidectomy, subtotal    • Family history of ischemic heart disease        Current medicines (including changes today)  Current Outpatient Prescriptions   Medication Sig Dispense Refill   • amLODIPine (NORVASC) 5 MG Tab TAKE 1 TABLET BY MOUTH EVERY DAY. 90 Tab 2   • losartan (COZAAR) 100 MG Tab TAKE 1 TAB BY MOUTH EVERY DAY. 90 Tab 2     No current facility-administered medications for this visit.        No Known Allergies    ROS: As per HPI       Objective:     Blood pressure 102/78, pulse 62, temperature 37 °C (98.6 °F), resp. rate 12, height 1.6 m (5' 3\"), weight 68.5 kg (151 lb), SpO2 100 %, not currently breastfeeding. Body mass index is 26.75 kg/m².    Physical Exam:  Constitutional: Well-developed and well-nourished. Not diaphoretic. No distress. Lucid and fluent.  Skin: Skin is warm and dry. No rash noted.  Head: Atraumatic without lesions.  Eyes: Conjunctivae and extraocular motions are normal. Pupils are equal, round, and reactive to light. No scleral icterus.   Neck: Supple, trachea midline. No thyromegaly present. No JVD or carotid " bruits appreciated  Cardiovascular: Regular rate and rhythm.  Normal S1, S2 without murmur appreciated.  Breast: no mass appreciated.  No axillary adenopathy palpable.  Non tender today.  No dimpling appreciated.  No change in skin texture or color.  Extremities: No cyanosis, clubbing, erythema, nor edema.   Neurological: Alert and oriented x 3.  Psychiatric:  Behavior, mood, and affect are appropriate.       Assessment and Plan:     48 y.o. female with the following issues:    1. Pain of left breast  MA-MAMMO DIAG BILAT W/IMPLANTS JAMES CAD    US-BREAST COMPLETE-LEFT         Followup: No Follow-up on file.

## 2018-12-07 ENCOUNTER — HOSPITAL ENCOUNTER (OUTPATIENT)
Dept: RADIOLOGY | Facility: MEDICAL CENTER | Age: 48
End: 2018-12-07
Attending: FAMILY MEDICINE
Payer: COMMERCIAL

## 2018-12-07 DIAGNOSIS — N64.4 PAIN OF LEFT BREAST: ICD-10-CM

## 2018-12-07 PROCEDURE — 76642 ULTRASOUND BREAST LIMITED: CPT | Mod: LT

## 2018-12-07 PROCEDURE — G0279 TOMOSYNTHESIS, MAMMO: HCPCS

## 2018-12-15 ENCOUNTER — HOSPITAL ENCOUNTER (OUTPATIENT)
Dept: LAB | Facility: MEDICAL CENTER | Age: 48
End: 2018-12-15
Attending: FAMILY MEDICINE
Payer: COMMERCIAL

## 2018-12-15 DIAGNOSIS — Z00.00 LABORATORY EXAMINATION ORDERED AS PART OF A ROUTINE GENERAL MEDICAL EXAMINATION: ICD-10-CM

## 2018-12-15 DIAGNOSIS — R73.01 ELEVATED FASTING GLUCOSE: ICD-10-CM

## 2018-12-15 LAB
ALBUMIN SERPL BCP-MCNC: 4.3 G/DL (ref 3.2–4.9)
ALBUMIN/GLOB SERPL: 1.3 G/DL
ALP SERPL-CCNC: 71 U/L (ref 30–99)
ALT SERPL-CCNC: 35 U/L (ref 2–50)
ANION GAP SERPL CALC-SCNC: 6 MMOL/L (ref 0–11.9)
AST SERPL-CCNC: 21 U/L (ref 12–45)
BILIRUB SERPL-MCNC: 0.6 MG/DL (ref 0.1–1.5)
BUN SERPL-MCNC: 16 MG/DL (ref 8–22)
CALCIUM SERPL-MCNC: 9.8 MG/DL (ref 8.5–10.5)
CHLORIDE SERPL-SCNC: 105 MMOL/L (ref 96–112)
CHOLEST SERPL-MCNC: 184 MG/DL (ref 100–199)
CO2 SERPL-SCNC: 29 MMOL/L (ref 20–33)
CREAT SERPL-MCNC: 0.54 MG/DL (ref 0.5–1.4)
ERYTHROCYTE [DISTWIDTH] IN BLOOD BY AUTOMATED COUNT: 38.6 FL (ref 35.9–50)
FASTING STATUS PATIENT QL REPORTED: NORMAL
GLOBULIN SER CALC-MCNC: 3.3 G/DL (ref 1.9–3.5)
GLUCOSE SERPL-MCNC: 97 MG/DL (ref 65–99)
HCT VFR BLD AUTO: 43.6 % (ref 37–47)
HDLC SERPL-MCNC: 56 MG/DL
HGB BLD-MCNC: 14.4 G/DL (ref 12–16)
LDLC SERPL CALC-MCNC: 108 MG/DL
MCH RBC QN AUTO: 28.5 PG (ref 27–33)
MCHC RBC AUTO-ENTMCNC: 33 G/DL (ref 33.6–35)
MCV RBC AUTO: 86.3 FL (ref 81.4–97.8)
PLATELET # BLD AUTO: 270 K/UL (ref 164–446)
PMV BLD AUTO: 10.9 FL (ref 9–12.9)
POTASSIUM SERPL-SCNC: 3.8 MMOL/L (ref 3.6–5.5)
PROT SERPL-MCNC: 7.6 G/DL (ref 6–8.2)
RBC # BLD AUTO: 5.05 M/UL (ref 4.2–5.4)
SODIUM SERPL-SCNC: 140 MMOL/L (ref 135–145)
TRIGL SERPL-MCNC: 101 MG/DL (ref 0–149)
WBC # BLD AUTO: 4.6 K/UL (ref 4.8–10.8)

## 2018-12-15 PROCEDURE — 83036 HEMOGLOBIN GLYCOSYLATED A1C: CPT

## 2018-12-15 PROCEDURE — 36415 COLL VENOUS BLD VENIPUNCTURE: CPT

## 2018-12-15 PROCEDURE — 85027 COMPLETE CBC AUTOMATED: CPT

## 2018-12-15 PROCEDURE — 80061 LIPID PANEL: CPT

## 2018-12-15 PROCEDURE — 80053 COMPREHEN METABOLIC PANEL: CPT

## 2018-12-18 LAB
EST. AVERAGE GLUCOSE BLD GHB EST-MCNC: 117 MG/DL
HBA1C MFR BLD: 5.7 % (ref 0–5.6)

## 2019-11-26 ENCOUNTER — OFFICE VISIT (OUTPATIENT)
Dept: MEDICAL GROUP | Facility: MEDICAL CENTER | Age: 49
End: 2019-11-26
Payer: COMMERCIAL

## 2019-11-26 VITALS
WEIGHT: 149 LBS | HEIGHT: 63 IN | BODY MASS INDEX: 26.4 KG/M2 | SYSTOLIC BLOOD PRESSURE: 98 MMHG | OXYGEN SATURATION: 98 % | TEMPERATURE: 98.1 F | DIASTOLIC BLOOD PRESSURE: 62 MMHG | RESPIRATION RATE: 14 BRPM | HEART RATE: 64 BPM

## 2019-11-26 DIAGNOSIS — Z00.00 LABORATORY EXAMINATION ORDERED AS PART OF A ROUTINE GENERAL MEDICAL EXAMINATION: ICD-10-CM

## 2019-11-26 DIAGNOSIS — Z12.39 SCREENING BREAST EXAMINATION: ICD-10-CM

## 2019-11-26 DIAGNOSIS — Z00.00 ROUTINE GENERAL MEDICAL EXAMINATION AT A HEALTH CARE FACILITY: ICD-10-CM

## 2019-11-26 PROCEDURE — 99396 PREV VISIT EST AGE 40-64: CPT | Performed by: FAMILY MEDICINE

## 2019-11-26 RX ORDER — INFLUENZA A VIRUS A/BRISBANE/02/2018 IVR-190 (H1N1) ANTIGEN (FORMALDEHYDE INACTIVATED), INFLUENZA A VIRUS A/KANSAS/14/2017 X-327 (H3N2) ANTIGEN (FORMALDEHYDE INACTIVATED), INFLUENZA B VIRUS B/PHUKET/3073/2013 ANTIGEN (FORMALDEHYDE INACTIVATED), AND INFLUENZA B VIRUS B/MARYLAND/15/2016 BX-69A ANTIGEN (FORMALDEHYDE INACTIVATED) 15; 15; 15; 15 UG/.5ML; UG/.5ML; UG/.5ML; UG/.5ML
INJECTION, SUSPENSION INTRAMUSCULAR
Refills: 0 | COMMUNITY
Start: 2019-10-08 | End: 2020-11-30

## 2019-11-26 ASSESSMENT — ENCOUNTER SYMPTOMS
WEIGHT LOSS: 0
ABDOMINAL PAIN: 0
WEAKNESS: 0
PALPITATIONS: 0
HEADACHES: 0
BACK PAIN: 1
MEMORY LOSS: 0
SHORTNESS OF BREATH: 0
BLOOD IN STOOL: 0
LOSS OF CONSCIOUSNESS: 0
HALLUCINATIONS: 0
DIZZINESS: 0
COUGH: 0
DEPRESSION: 0
VOMITING: 0
BRUISES/BLEEDS EASILY: 0
NAUSEA: 0
ORTHOPNEA: 0
SPEECH CHANGE: 0
FEVER: 0
INSOMNIA: 0
WHEEZING: 0
SENSORY CHANGE: 0
MYALGIAS: 0
SPUTUM PRODUCTION: 0
TINGLING: 0
NECK PAIN: 0
FOCAL WEAKNESS: 0
NERVOUS/ANXIOUS: 0
TREMORS: 0
CHILLS: 0
SEIZURES: 0
BLURRED VISION: 0
SORE THROAT: 0
HEARTBURN: 0
DIARRHEA: 0
DOUBLE VISION: 0

## 2019-11-26 ASSESSMENT — LIFESTYLE VARIABLES: SUBSTANCE_ABUSE: 0

## 2019-11-26 ASSESSMENT — PATIENT HEALTH QUESTIONNAIRE - PHQ9: CLINICAL INTERPRETATION OF PHQ2 SCORE: 0

## 2019-11-26 NOTE — PROGRESS NOTES
Subjective:      Ines Soliz is a 49 y.o. female who presents with Annual Exam       She is here for her general medical examination.     HPI     has a past medical history of Choledocholithiasis with obstruction (6/2014), Dental disorder, Elevated blood pressure complicating pregnancy, antepartum (2002), Family history of diabetes mellitus, Family history of diabetes mellitus in mother, Family history of ischemic heart disease, Fibroid uterus, History of thyroidectomy, subtotal, and Hypertension (4/2012).   has a past surgical history that includes thyroidectomy (1997); ercp in or (6/15/2014); rolando by laparoscopy (6/16/2014); cholecystectomy (6/16/2014); abdominal hysterectomy total (7/11/2017); and salpingectomy (Bilateral, 7/11/2017).  family history includes Diabetes in her mother; Heart Disease (age of onset: 70) in her father; Hypertension in her father.   reports that she has never smoked. She has never used smokeless tobacco. She reports that she does not drink alcohol or use drugs.      Current Outpatient Medications:   •  FLUZONE QUADRIVALENT 0.5 ML Suspension Prefilled Syringe injection, TO BE ADMINISTERED BY PHARMACIST FOR IMMUNIZATION, Disp: , Rfl: 0  •  amLODIPine (NORVASC) 5 MG Tab, TAKE 1 TABLET BY MOUTH EVERY DAY., Disp: 90 Tab, Rfl: 2  has No Known Allergies.      Review of Systems   Constitutional: Negative for chills, fever, malaise/fatigue and weight loss.   HENT: Negative for congestion, hearing loss, sore throat and tinnitus.    Eyes: Negative for blurred vision and double vision.   Respiratory: Negative for cough, sputum production, shortness of breath and wheezing.    Cardiovascular: Negative for chest pain, palpitations, orthopnea and leg swelling.   Gastrointestinal: Negative for abdominal pain, blood in stool, diarrhea, heartburn, nausea and vomiting.   Genitourinary: Negative for dysuria, frequency, hematuria and urgency.   Musculoskeletal: Positive for back pain. Negative for  "joint pain, myalgias and neck pain.        Works at [employer], getting back pain   Skin: Negative for rash.   Neurological: Negative for dizziness, tingling, tremors, sensory change, speech change, focal weakness, seizures, loss of consciousness, weakness and headaches.   Endo/Heme/Allergies: Negative for environmental allergies. Does not bruise/bleed easily.   Psychiatric/Behavioral: Negative for depression, hallucinations, memory loss, substance abuse and suicidal ideas. The patient is not nervous/anxious and does not have insomnia.           Objective:     BP (!) 98/62   Pulse 64   Temp 36.7 °C (98.1 °F)   Resp 14   Ht 1.6 m (5' 3\")   Wt 67.6 kg (149 lb)   SpO2 98%   BMI 26.39 kg/m²      Physical Exam  Vitals signs reviewed.   Constitutional:       Appearance: She is well-developed.   HENT:      Head: Normocephalic and atraumatic.   Eyes:      General:         Left eye: No discharge.      Pupils: Pupils are equal, round, and reactive to light.   Neck:      Musculoskeletal: Normal range of motion and neck supple.      Thyroid: No thyromegaly.      Vascular: No JVD.   Cardiovascular:      Rate and Rhythm: Normal rate and regular rhythm.      Heart sounds: Normal heart sounds. No murmur.   Pulmonary:      Effort: Pulmonary effort is normal. No respiratory distress.      Breath sounds: Normal breath sounds. No wheezing or rales.   Abdominal:      General: Bowel sounds are normal. There is no distension.      Palpations: Abdomen is soft. There is no mass.      Tenderness: There is no tenderness.   Musculoskeletal: Normal range of motion.   Lymphadenopathy:      Cervical: No cervical adenopathy.   Skin:     General: Skin is warm and dry.      Findings: No erythema or rash.   Neurological:      Mental Status: She is alert and oriented to person, place, and time.      Coordination: Coordination normal.   Psychiatric:         Behavior: Behavior normal.                 Assessment/Plan:       1. Routine general medical " examination at a health care facility  She is generally well.      2. Laboratory examination ordered as part of a routine general medical examination  Routine orders discussed and placed  - HEMOGLOBIN A1C; Future  - Comp Metabolic Panel; Future  - Lipid Profile; Future  - CBC WITHOUT DIFFERENTIAL; Future    3. Screening breast examination  Mammogram order discussed and placed  - MA-SCREENING MAMMO BILAT W/TOMOSYNTHESIS W/CAD; Future

## 2019-12-07 DIAGNOSIS — I10 ESSENTIAL HYPERTENSION: ICD-10-CM

## 2019-12-09 ENCOUNTER — HOSPITAL ENCOUNTER (OUTPATIENT)
Dept: LAB | Facility: MEDICAL CENTER | Age: 49
End: 2019-12-09
Attending: FAMILY MEDICINE
Payer: COMMERCIAL

## 2019-12-09 DIAGNOSIS — Z00.00 LABORATORY EXAMINATION ORDERED AS PART OF A ROUTINE GENERAL MEDICAL EXAMINATION: ICD-10-CM

## 2019-12-09 LAB
ALBUMIN SERPL BCP-MCNC: 4.2 G/DL (ref 3.2–4.9)
ALBUMIN/GLOB SERPL: 1.3 G/DL
ALP SERPL-CCNC: 63 U/L (ref 30–99)
ALT SERPL-CCNC: 29 U/L (ref 2–50)
ANION GAP SERPL CALC-SCNC: 7 MMOL/L (ref 0–11.9)
AST SERPL-CCNC: 21 U/L (ref 12–45)
BILIRUB SERPL-MCNC: 0.6 MG/DL (ref 0.1–1.5)
BUN SERPL-MCNC: 13 MG/DL (ref 8–22)
CALCIUM SERPL-MCNC: 9.4 MG/DL (ref 8.5–10.5)
CHLORIDE SERPL-SCNC: 106 MMOL/L (ref 96–112)
CHOLEST SERPL-MCNC: 216 MG/DL (ref 100–199)
CO2 SERPL-SCNC: 29 MMOL/L (ref 20–33)
CREAT SERPL-MCNC: 0.52 MG/DL (ref 0.5–1.4)
ERYTHROCYTE [DISTWIDTH] IN BLOOD BY AUTOMATED COUNT: 38.2 FL (ref 35.9–50)
EST. AVERAGE GLUCOSE BLD GHB EST-MCNC: 117 MG/DL
FASTING STATUS PATIENT QL REPORTED: NORMAL
GLOBULIN SER CALC-MCNC: 3.2 G/DL (ref 1.9–3.5)
GLUCOSE SERPL-MCNC: 102 MG/DL (ref 65–99)
HBA1C MFR BLD: 5.7 % (ref 0–5.6)
HCT VFR BLD AUTO: 44.5 % (ref 37–47)
HDLC SERPL-MCNC: 50 MG/DL
HGB BLD-MCNC: 14.9 G/DL (ref 12–16)
LDLC SERPL CALC-MCNC: 140 MG/DL
MCH RBC QN AUTO: 28.5 PG (ref 27–33)
MCHC RBC AUTO-ENTMCNC: 33.5 G/DL (ref 33.6–35)
MCV RBC AUTO: 85.2 FL (ref 81.4–97.8)
PLATELET # BLD AUTO: 265 K/UL (ref 164–446)
PMV BLD AUTO: 10.8 FL (ref 9–12.9)
POTASSIUM SERPL-SCNC: 3.5 MMOL/L (ref 3.6–5.5)
PROT SERPL-MCNC: 7.4 G/DL (ref 6–8.2)
RBC # BLD AUTO: 5.22 M/UL (ref 4.2–5.4)
SODIUM SERPL-SCNC: 142 MMOL/L (ref 135–145)
TRIGL SERPL-MCNC: 130 MG/DL (ref 0–149)
WBC # BLD AUTO: 5 K/UL (ref 4.8–10.8)

## 2019-12-09 PROCEDURE — 80061 LIPID PANEL: CPT

## 2019-12-09 PROCEDURE — 85027 COMPLETE CBC AUTOMATED: CPT

## 2019-12-09 PROCEDURE — 36415 COLL VENOUS BLD VENIPUNCTURE: CPT

## 2019-12-09 PROCEDURE — 80053 COMPREHEN METABOLIC PANEL: CPT

## 2019-12-09 PROCEDURE — 83036 HEMOGLOBIN GLYCOSYLATED A1C: CPT

## 2019-12-09 RX ORDER — AMLODIPINE BESYLATE 5 MG/1
TABLET ORAL
Qty: 90 TAB | Refills: 2 | Status: SHIPPED | OUTPATIENT
Start: 2019-12-09 | End: 2020-09-22 | Stop reason: SDUPTHER

## 2020-02-04 ENCOUNTER — HOSPITAL ENCOUNTER (OUTPATIENT)
Dept: RADIOLOGY | Facility: MEDICAL CENTER | Age: 50
End: 2020-02-04
Attending: FAMILY MEDICINE
Payer: COMMERCIAL

## 2020-02-04 DIAGNOSIS — Z12.39 SCREENING BREAST EXAMINATION: ICD-10-CM

## 2020-02-04 PROCEDURE — 77067 SCR MAMMO BI INCL CAD: CPT

## 2020-06-04 NOTE — ADDENDUM NOTE
Addended by: RIGO SMILEY on: 11/16/2017 01:27 PM     Modules accepted: Orders     You can access the FollowMyHealth Patient Portal offered by [facility] by registering at the following website: [email or web address]. By joining [facility]’s FollowMyHealth portal, you will also be able to view your health information using other applications (apps) compatible with our system.

## 2020-09-22 DIAGNOSIS — I10 ESSENTIAL HYPERTENSION: ICD-10-CM

## 2020-09-22 RX ORDER — AMLODIPINE BESYLATE 5 MG/1
5 TABLET ORAL
Qty: 90 TAB | Refills: 2 | Status: SHIPPED | OUTPATIENT
Start: 2020-09-22 | End: 2021-05-11

## 2020-10-12 ENCOUNTER — APPOINTMENT (OUTPATIENT)
Dept: URGENT CARE | Facility: PHYSICIAN GROUP | Age: 50
End: 2020-10-12
Payer: COMMERCIAL

## 2020-10-12 ENCOUNTER — NON-PROVIDER VISIT (OUTPATIENT)
Dept: URGENT CARE | Facility: PHYSICIAN GROUP | Age: 50
End: 2020-10-12
Payer: COMMERCIAL

## 2020-10-12 DIAGNOSIS — Z23 FLU VACCINE NEED: Primary | ICD-10-CM

## 2020-10-12 PROCEDURE — 90471 IMMUNIZATION ADMIN: CPT | Performed by: PHYSICIAN ASSISTANT

## 2020-10-12 PROCEDURE — 90686 IIV4 VACC NO PRSV 0.5 ML IM: CPT | Performed by: PHYSICIAN ASSISTANT

## 2020-10-12 NOTE — PROGRESS NOTES
"Ines Soliz is a 50 y.o. female here for a non-provider visit for:   FLU    Reason for immunization: Annual Flu Vaccine  Immunization records indicate need for vaccine: Yes, confirmed with Epic  Minimum interval has been met for this vaccine: Yes  ABN completed: Yes    Order and dose verified by: MASOUD  VIS Dated  8/15/19 was given to patient: Yes  All IAC Questionnaire questions were answered \"No.\"    Patient tolerated injection and no adverse effects were observed or reported: Yes    Pt scheduled for next dose in series: Not Indicated      "

## 2020-11-30 ENCOUNTER — HOSPITAL ENCOUNTER (OUTPATIENT)
Facility: MEDICAL CENTER | Age: 50
End: 2020-11-30
Attending: FAMILY MEDICINE
Payer: COMMERCIAL

## 2020-11-30 ENCOUNTER — OFFICE VISIT (OUTPATIENT)
Dept: MEDICAL GROUP | Facility: MEDICAL CENTER | Age: 50
End: 2020-11-30
Payer: COMMERCIAL

## 2020-11-30 VITALS
TEMPERATURE: 97.8 F | HEART RATE: 65 BPM | RESPIRATION RATE: 16 BRPM | WEIGHT: 150 LBS | BODY MASS INDEX: 26.58 KG/M2 | SYSTOLIC BLOOD PRESSURE: 122 MMHG | DIASTOLIC BLOOD PRESSURE: 82 MMHG | HEIGHT: 63 IN | OXYGEN SATURATION: 99 %

## 2020-11-30 DIAGNOSIS — Z01.419 WELL WOMAN EXAM WITH ROUTINE GYNECOLOGICAL EXAM: ICD-10-CM

## 2020-11-30 DIAGNOSIS — Z00.00 LABORATORY EXAMINATION ORDERED AS PART OF A ROUTINE GENERAL MEDICAL EXAMINATION: ICD-10-CM

## 2020-11-30 DIAGNOSIS — Z12.39 SCREENING BREAST EXAMINATION: ICD-10-CM

## 2020-11-30 DIAGNOSIS — Z12.11 COLON CANCER SCREENING: ICD-10-CM

## 2020-11-30 PROCEDURE — 99396 PREV VISIT EST AGE 40-64: CPT | Performed by: FAMILY MEDICINE

## 2020-11-30 PROCEDURE — 88175 CYTOPATH C/V AUTO FLUID REDO: CPT

## 2020-11-30 PROCEDURE — 87624 HPV HI-RISK TYP POOLED RSLT: CPT

## 2020-11-30 ASSESSMENT — ENCOUNTER SYMPTOMS
HALLUCINATIONS: 0
DOUBLE VISION: 0
ORTHOPNEA: 0
DIZZINESS: 0
BLOOD IN STOOL: 0
LOSS OF CONSCIOUSNESS: 0
DIAPHORESIS: 0
TINGLING: 0
MEMORY LOSS: 0
FEVER: 0
BRUISES/BLEEDS EASILY: 0
SHORTNESS OF BREATH: 0
HEADACHES: 0
WEAKNESS: 0
BACK PAIN: 0
CHILLS: 0
SPUTUM PRODUCTION: 0
DEPRESSION: 0
SEIZURES: 0
COUGH: 0
SENSORY CHANGE: 0
SPEECH CHANGE: 0
TREMORS: 0
ABDOMINAL PAIN: 0
NECK PAIN: 0
WEIGHT LOSS: 0
NAUSEA: 0
INSOMNIA: 0
HEARTBURN: 0
PALPITATIONS: 0
SORE THROAT: 0
FOCAL WEAKNESS: 0
VOMITING: 0
WHEEZING: 0
BLURRED VISION: 0
NERVOUS/ANXIOUS: 0
DIARRHEA: 0
MYALGIAS: 0

## 2020-11-30 ASSESSMENT — PATIENT HEALTH QUESTIONNAIRE - PHQ9: CLINICAL INTERPRETATION OF PHQ2 SCORE: 0

## 2020-11-30 ASSESSMENT — LIFESTYLE VARIABLES: SUBSTANCE_ABUSE: 0

## 2020-11-30 ASSESSMENT — FIBROSIS 4 INDEX: FIB4 SCORE: 0.75

## 2020-11-30 NOTE — PROGRESS NOTES
Subjective:      Ines Soliz is a 50 y.o. female who presents with Annual Exam and Gynecologic Exam        she is here for her Well woman examination with pap.  She had a partial hysterectomy due to a large fibroid but her cervix has remained intact.      HPI     has a past medical history of Choledocholithiasis with obstruction (6/2014), Dental disorder, Elevated blood pressure complicating pregnancy, antepartum (2002), Family history of diabetes mellitus, Family history of diabetes mellitus in mother, Family history of ischemic heart disease, Fibroid uterus, History of thyroidectomy, subtotal, and Hypertension (4/2012).   has a past surgical history that includes thyroidectomy (1997); ercp in or (6/15/2014); rolando by laparoscopy (6/16/2014); cholecystectomy (6/16/2014); abdominal hysterectomy total (7/11/2017); and salpingectomy (Bilateral, 7/11/2017).  family history includes Diabetes in her mother; Heart Disease (age of onset: 70) in her father; Hypertension in her father.   reports that she has never smoked. She has never used smokeless tobacco. She reports that she does not drink alcohol or use drugs.      Current Outpatient Medications:   •  amLODIPine (NORVASC) 5 MG Tab, Take 1 Tab by mouth every day., Disp: 90 Tab, Rfl: 2  has No Known Allergies.    Review of Systems   Constitutional: Negative for chills, diaphoresis, fever, malaise/fatigue and weight loss.   HENT: Negative for congestion, hearing loss and sore throat.    Eyes: Negative for blurred vision and double vision.   Respiratory: Negative for cough, sputum production, shortness of breath and wheezing.    Cardiovascular: Negative for chest pain, palpitations, orthopnea and leg swelling.   Gastrointestinal: Negative for abdominal pain, blood in stool, diarrhea, heartburn, nausea and vomiting.   Genitourinary: Negative for dysuria, frequency, hematuria and urgency.   Musculoskeletal: Negative for back pain, joint pain, myalgias and neck pain.  "  Skin: Negative for rash.   Neurological: Negative for dizziness, tingling, tremors, sensory change, speech change, focal weakness, seizures, loss of consciousness, weakness and headaches.   Endo/Heme/Allergies: Negative for environmental allergies. Does not bruise/bleed easily.   Psychiatric/Behavioral: Negative for depression, hallucinations, memory loss, substance abuse and suicidal ideas. The patient is not nervous/anxious and does not have insomnia.           Objective:     /82   Pulse 65   Temp 36.6 °C (97.8 °F)   Resp 16   Ht 1.6 m (5' 3\")   Wt 68 kg (150 lb)   SpO2 99%   BMI 26.57 kg/m²      Physical Exam  Vitals signs reviewed.   Constitutional:       General: She is not in acute distress.     Appearance: She is well-developed.   HENT:      Head: Normocephalic and atraumatic.   Eyes:      General:         Left eye: No discharge.      Pupils: Pupils are equal, round, and reactive to light.   Neck:      Musculoskeletal: Normal range of motion and neck supple.      Thyroid: No thyromegaly.      Vascular: No JVD.   Cardiovascular:      Rate and Rhythm: Normal rate and regular rhythm.      Heart sounds: Normal heart sounds. No murmur.   Pulmonary:      Effort: Pulmonary effort is normal. No respiratory distress.      Breath sounds: Normal breath sounds. No wheezing or rales.   Chest:      Breasts: Breasts are symmetrical.         Right: No inverted nipple, mass, nipple discharge, skin change or tenderness.         Left: No inverted nipple, mass, nipple discharge, skin change or tenderness.   Abdominal:      General: Bowel sounds are normal. There is no distension.      Palpations: Abdomen is soft. There is no mass.      Tenderness: There is no abdominal tenderness.   Genitourinary:     Vagina: Normal. No vaginal discharge.      Cervix: No discharge.      Adnexa:         Right: No mass.          Left: No mass.        Comments: Pap smear (brush and spatula) taken and sent  Musculoskeletal: Normal " range of motion.   Lymphadenopathy:      Cervical: No cervical adenopathy.   Skin:     General: Skin is warm and dry.      Findings: No erythema or rash.   Neurological:      Mental Status: She is alert and oriented to person, place, and time.      Coordination: Coordination normal.   Psychiatric:         Behavior: Behavior normal.                 Assessment/Plan:        1. Well woman exam with routine gynecological exam  Her medical problems are generally stable, she is quite well.  Await Pap result  - THINPREP PAP WITH HPV; Future    2. Laboratory examination ordered as part of a routine general medical examination  Routine orders discussed and placed  - Comp Metabolic Panel; Future  - Lipid Profile; Future  - CBC WITHOUT DIFFERENTIAL; Future  - TSH; Future    3. Colon cancer screening  Colon cancer screening discussed, recommendation made to do this sometime after her first or second Covid immunization.  - REFERRAL TO GI FOR COLONOSCOPY    4. Screening breast examination  Mammogram order discussed and placed  - MA-SCREENING MAMMO BILAT W/TOMOSYNTHESIS W/CAD; Future

## 2020-12-01 DIAGNOSIS — Z01.419 WELL WOMAN EXAM WITH ROUTINE GYNECOLOGICAL EXAM: ICD-10-CM

## 2020-12-10 ENCOUNTER — TELEMEDICINE (OUTPATIENT)
Dept: MEDICAL GROUP | Facility: MEDICAL CENTER | Age: 50
End: 2020-12-10
Payer: COMMERCIAL

## 2020-12-10 VITALS — BODY MASS INDEX: 26.58 KG/M2 | WEIGHT: 150 LBS | HEIGHT: 63 IN

## 2020-12-10 DIAGNOSIS — Z20.822 EXPOSURE TO COVID-19 VIRUS: ICD-10-CM

## 2020-12-10 PROCEDURE — 99213 OFFICE O/P EST LOW 20 MIN: CPT | Mod: CS,95,CR | Performed by: FAMILY MEDICINE

## 2020-12-10 ASSESSMENT — FIBROSIS 4 INDEX: FIB4 SCORE: 0.75

## 2020-12-10 NOTE — PROGRESS NOTES
"Virtual Visit: Established Patient   This visit was conducted via Zoom  using secure and encrypted videoconferencing technology. The patient was in a private location in the state of [place].    The patient's identity was confirmed and verbal consent was obtained for this virtual visit.      CC: Exposure to Covid positive coworker                                                                                                                                 HPI:   Ines presents today with the following.    1. Exposure to COVID-19 virus  Patient was exposed to a coworker at [employer] who tested positive.  She does at times right share with this patient.  She did test negative but [employer] is requesting that she get a follow-up test before she comes back to work.  Order is discussed and placed.  She is familiar with the testing site.        Patient Active Problem List    Diagnosis Date Noted   • Elevated fasting glucose 09/28/2018   • Status post partial hysterectomy with remaining cervical stump 09/22/2017   • Essential hypertension 04/01/2012   • History of iron deficiency anemia 12/05/2011   • History of thyroidectomy, subtotal    • Family history of ischemic heart disease        Current Outpatient Medications   Medication Sig Dispense Refill   • amLODIPine (NORVASC) 5 MG Tab Take 1 Tab by mouth every day. 90 Tab 2     No current facility-administered medications for this visit.          Allergies as of 12/10/2020   • (No Known Allergies)        ROS:  Denies, chest pain, Shortness of breath, Edema.  Denies fever, chills or sore throat.    Ht 1.6 m (5' 3\")   Wt 68 kg (150 lb)   BMI 26.57 kg/m²       Physical Exam:  Constitutional: Alert, no distress, well-groomed.  No cough appreciated.  Skin: No rashes in visible areas.  Eye: Round. Conjunctiva clear, No icterus.   ENMT: Lips pink without lesions, good dentition, moist mucous membranes. Phonation normal.  Neck: No masses, no thyromegaly. Moves freely without " pain.  CV: Pulse as reported by patient  Respiratory: Unlabored respiratory effort, no cough or audible wheeze  Psych: Alert and oriented x3, normal affect and mood.          Assessment and Plan.   50 y.o. female with the following issues.    1. Exposure to COVID-19 virus  Exposure with one negative test, work is requiring another negative test prior to returning to work.  No current symptoms.  - COVID/SARS COV-2 PCR; Future    Recheck as needed.

## 2020-12-11 ENCOUNTER — HOSPITAL ENCOUNTER (OUTPATIENT)
Dept: LAB | Facility: MEDICAL CENTER | Age: 50
End: 2020-12-11
Attending: FAMILY MEDICINE
Payer: COMMERCIAL

## 2020-12-11 DIAGNOSIS — Z20.822 EXPOSURE TO COVID-19 VIRUS: ICD-10-CM

## 2020-12-11 LAB — COVID ORDER STATUS COVID19: NORMAL

## 2020-12-11 PROCEDURE — U0003 INFECTIOUS AGENT DETECTION BY NUCLEIC ACID (DNA OR RNA); SEVERE ACUTE RESPIRATORY SYNDROME CORONAVIRUS 2 (SARS-COV-2) (CORONAVIRUS DISEASE [COVID-19]), AMPLIFIED PROBE TECHNIQUE, MAKING USE OF HIGH THROUGHPUT TECHNOLOGIES AS DESCRIBED BY CMS-2020-01-R: HCPCS

## 2020-12-12 LAB
SARS-COV-2 RNA RESP QL NAA+PROBE: NOTDETECTED
SPECIMEN SOURCE: NORMAL

## 2021-05-10 ENCOUNTER — HOSPITAL ENCOUNTER (OUTPATIENT)
Dept: RADIOLOGY | Facility: MEDICAL CENTER | Age: 51
End: 2021-05-10
Attending: FAMILY MEDICINE
Payer: COMMERCIAL

## 2021-05-10 DIAGNOSIS — Z12.39 SCREENING BREAST EXAMINATION: ICD-10-CM

## 2021-05-10 PROCEDURE — 77063 BREAST TOMOSYNTHESIS BI: CPT

## 2021-05-11 DIAGNOSIS — I10 ESSENTIAL HYPERTENSION: ICD-10-CM

## 2021-05-11 RX ORDER — AMLODIPINE BESYLATE 5 MG/1
TABLET ORAL
Qty: 30 TABLET | Refills: 5 | Status: SHIPPED | OUTPATIENT
Start: 2021-05-11 | End: 2021-11-02

## 2021-05-17 ENCOUNTER — HOSPITAL ENCOUNTER (OUTPATIENT)
Dept: LAB | Facility: MEDICAL CENTER | Age: 51
End: 2021-05-17
Attending: FAMILY MEDICINE
Payer: COMMERCIAL

## 2021-05-17 DIAGNOSIS — Z00.00 LABORATORY EXAMINATION ORDERED AS PART OF A ROUTINE GENERAL MEDICAL EXAMINATION: ICD-10-CM

## 2021-05-17 LAB
ALBUMIN SERPL BCP-MCNC: 4.4 G/DL (ref 3.2–4.9)
ALBUMIN/GLOB SERPL: 1.2 G/DL
ALP SERPL-CCNC: 75 U/L (ref 30–99)
ALT SERPL-CCNC: 41 U/L (ref 2–50)
ANION GAP SERPL CALC-SCNC: 8 MMOL/L (ref 7–16)
AST SERPL-CCNC: 24 U/L (ref 12–45)
BILIRUB SERPL-MCNC: 0.5 MG/DL (ref 0.1–1.5)
BUN SERPL-MCNC: 14 MG/DL (ref 8–22)
CALCIUM SERPL-MCNC: 9.6 MG/DL (ref 8.5–10.5)
CHLORIDE SERPL-SCNC: 106 MMOL/L (ref 96–112)
CHOLEST SERPL-MCNC: 224 MG/DL (ref 100–199)
CO2 SERPL-SCNC: 28 MMOL/L (ref 20–33)
CREAT SERPL-MCNC: 0.55 MG/DL (ref 0.5–1.4)
ERYTHROCYTE [DISTWIDTH] IN BLOOD BY AUTOMATED COUNT: 39.1 FL (ref 35.9–50)
FASTING STATUS PATIENT QL REPORTED: NORMAL
GLOBULIN SER CALC-MCNC: 3.6 G/DL (ref 1.9–3.5)
GLUCOSE SERPL-MCNC: 108 MG/DL (ref 65–99)
HCT VFR BLD AUTO: 46.7 % (ref 37–47)
HDLC SERPL-MCNC: 53 MG/DL
HGB BLD-MCNC: 15 G/DL (ref 12–16)
LDLC SERPL CALC-MCNC: 139 MG/DL
MCH RBC QN AUTO: 27.8 PG (ref 27–33)
MCHC RBC AUTO-ENTMCNC: 32.1 G/DL (ref 33.6–35)
MCV RBC AUTO: 86.6 FL (ref 81.4–97.8)
PLATELET # BLD AUTO: 259 K/UL (ref 164–446)
PMV BLD AUTO: 10.9 FL (ref 9–12.9)
POTASSIUM SERPL-SCNC: 4 MMOL/L (ref 3.6–5.5)
PROT SERPL-MCNC: 8 G/DL (ref 6–8.2)
RBC # BLD AUTO: 5.39 M/UL (ref 4.2–5.4)
SODIUM SERPL-SCNC: 142 MMOL/L (ref 135–145)
TRIGL SERPL-MCNC: 162 MG/DL (ref 0–149)
TSH SERPL DL<=0.005 MIU/L-ACNC: 3.28 UIU/ML (ref 0.38–5.33)
WBC # BLD AUTO: 5.7 K/UL (ref 4.8–10.8)

## 2021-05-17 PROCEDURE — 85027 COMPLETE CBC AUTOMATED: CPT

## 2021-05-17 PROCEDURE — 80053 COMPREHEN METABOLIC PANEL: CPT

## 2021-05-17 PROCEDURE — 84443 ASSAY THYROID STIM HORMONE: CPT

## 2021-05-17 PROCEDURE — 36415 COLL VENOUS BLD VENIPUNCTURE: CPT

## 2021-05-17 PROCEDURE — 80061 LIPID PANEL: CPT

## 2021-11-02 DIAGNOSIS — I10 ESSENTIAL HYPERTENSION: ICD-10-CM

## 2021-11-02 RX ORDER — AMLODIPINE BESYLATE 5 MG/1
TABLET ORAL
Qty: 90 TABLET | Refills: 0 | Status: SHIPPED | OUTPATIENT
Start: 2021-11-02 | End: 2022-01-26

## 2021-12-20 ENCOUNTER — OFFICE VISIT (OUTPATIENT)
Dept: MEDICAL GROUP | Facility: MEDICAL CENTER | Age: 51
End: 2021-12-20
Payer: COMMERCIAL

## 2021-12-20 VITALS
HEIGHT: 63 IN | TEMPERATURE: 97.6 F | DIASTOLIC BLOOD PRESSURE: 82 MMHG | HEART RATE: 75 BPM | OXYGEN SATURATION: 98 % | RESPIRATION RATE: 14 BRPM | SYSTOLIC BLOOD PRESSURE: 134 MMHG | WEIGHT: 152.78 LBS | BODY MASS INDEX: 27.07 KG/M2

## 2021-12-20 DIAGNOSIS — Z12.11 COLON CANCER SCREENING: ICD-10-CM

## 2021-12-20 DIAGNOSIS — Z00.00 ROUTINE GENERAL MEDICAL EXAMINATION AT A HEALTH CARE FACILITY: ICD-10-CM

## 2021-12-20 DIAGNOSIS — Z00.00 LABORATORY EXAMINATION ORDERED AS PART OF A ROUTINE GENERAL MEDICAL EXAMINATION: ICD-10-CM

## 2021-12-20 PROCEDURE — 99396 PREV VISIT EST AGE 40-64: CPT | Performed by: FAMILY MEDICINE

## 2021-12-20 ASSESSMENT — ENCOUNTER SYMPTOMS
MEMORY LOSS: 0
HALLUCINATIONS: 0
DEPRESSION: 0
DIAPHORESIS: 0
FOCAL WEAKNESS: 0
NAUSEA: 0
SPUTUM PRODUCTION: 0
BLURRED VISION: 0
SPEECH CHANGE: 0
BLOOD IN STOOL: 0
DIZZINESS: 0
DOUBLE VISION: 0
TREMORS: 0
BRUISES/BLEEDS EASILY: 0
TINGLING: 0
ABDOMINAL PAIN: 0
SHORTNESS OF BREATH: 0
NERVOUS/ANXIOUS: 0
SORE THROAT: 0
SENSORY CHANGE: 0
ORTHOPNEA: 0
INSOMNIA: 0
PALPITATIONS: 0
WEIGHT LOSS: 0
LOSS OF CONSCIOUSNESS: 0
VOMITING: 0
NECK PAIN: 0
DIARRHEA: 0
CHILLS: 0
WHEEZING: 0
WEAKNESS: 0
MYALGIAS: 0
HEADACHES: 0
BACK PAIN: 1
COUGH: 0
SEIZURES: 0
HEARTBURN: 0
FEVER: 0

## 2021-12-20 ASSESSMENT — LIFESTYLE VARIABLES: SUBSTANCE_ABUSE: 0

## 2021-12-20 ASSESSMENT — FIBROSIS 4 INDEX: FIB4 SCORE: 0.75

## 2021-12-20 ASSESSMENT — PATIENT HEALTH QUESTIONNAIRE - PHQ9: CLINICAL INTERPRETATION OF PHQ2 SCORE: 0

## 2021-12-20 NOTE — PROGRESS NOTES
Subjective     Ines Soliz is a 51 y.o. female who presents with Annual Exam        she is here for her annual examination    HPI     has a past medical history of Choledocholithiasis with obstruction (6/2014), Dental disorder, Elevated blood pressure complicating pregnancy, antepartum (2002), Family history of diabetes mellitus, Family history of diabetes mellitus in mother, Family history of ischemic heart disease, Fibroid uterus, History of thyroidectomy, subtotal, and Hypertension (4/2012).   has a past surgical history that includes thyroidectomy (1997); ercp in or (6/15/2014); rolando by laparoscopy (6/16/2014); cholecystectomy (6/16/2014); abdominal hysterectomy total (7/11/2017); and salpingectomy (Bilateral, 7/11/2017).  family history includes Diabetes in her mother; Heart Disease (age of onset: 70) in her father; Hypertension in her father.   reports that she has never smoked. She has never used smokeless tobacco. She reports that she does not drink alcohol and does not use drugs.      Current Outpatient Medications:   •  amLODIPine (NORVASC) 5 MG Tab, TAKE 1 TABLET BY MOUTH EVERY DAY, Disp: 90 Tablet, Rfl: 0  has No Known Allergies.    Review of Systems   Constitutional: Negative for chills, diaphoresis, fever, malaise/fatigue and weight loss.   HENT: Negative for congestion, hearing loss, sore throat and tinnitus.    Eyes: Negative for blurred vision and double vision.   Respiratory: Negative for cough, sputum production, shortness of breath and wheezing.    Cardiovascular: Negative for chest pain, palpitations, orthopnea and leg swelling.   Gastrointestinal: Negative for abdominal pain, blood in stool, diarrhea, heartburn, nausea and vomiting.   Genitourinary: Negative for dysuria, frequency, hematuria and urgency.        Nocturia 1 sometimes twice   Musculoskeletal: Positive for back pain. Negative for joint pain, myalgias and neck pain.        Some low back pain due to standing long hours  "  Skin: Negative for rash.   Neurological: Negative for dizziness, tingling, tremors, sensory change, speech change, focal weakness, seizures, loss of consciousness, weakness and headaches.   Endo/Heme/Allergies: Negative for environmental allergies. Does not bruise/bleed easily.   Psychiatric/Behavioral: Negative for depression, hallucinations, memory loss, substance abuse and suicidal ideas. The patient is not nervous/anxious and does not have insomnia.               Objective     /82 (BP Location: Right arm, Patient Position: Sitting, BP Cuff Size: Adult)   Pulse 75   Temp 36.4 °C (97.6 °F) (Temporal)   Resp 14   Ht 1.6 m (5' 3\")   Wt 69.3 kg (152 lb 12.5 oz)   LMP 06/24/2017   SpO2 98%   BMI 27.06 kg/m²      Physical Exam  Vitals reviewed.   Constitutional:       Appearance: She is well-developed.   HENT:      Head: Normocephalic and atraumatic.   Eyes:      General:         Left eye: No discharge.      Pupils: Pupils are equal, round, and reactive to light.   Neck:      Thyroid: No thyromegaly.      Vascular: No JVD.   Cardiovascular:      Rate and Rhythm: Normal rate and regular rhythm.      Heart sounds: Normal heart sounds. No murmur heard.      Pulmonary:      Effort: Pulmonary effort is normal. No respiratory distress.      Breath sounds: Normal breath sounds. No wheezing or rales.   Abdominal:      General: Bowel sounds are normal. There is no distension.      Palpations: Abdomen is soft. There is no mass.      Tenderness: There is no abdominal tenderness.   Musculoskeletal:         General: Normal range of motion.      Cervical back: Normal range of motion and neck supple.   Lymphadenopathy:      Cervical: No cervical adenopathy.   Skin:     General: Skin is warm and dry.      Findings: No erythema or rash.   Neurological:      Mental Status: She is alert and oriented to person, place, and time.      Coordination: Coordination normal.   Psychiatric:         Behavior: Behavior normal. "         Assessment & Plan        1. Routine general medical examination at a health care facility  She is generally well and medically stable.    2. Laboratory examination ordered as part of a routine general medical examination  Routine orders discussed and placed  - HEMOGLOBIN A1C; Future  - Comp Metabolic Panel; Future  - Lipid Profile; Future  - CBC WITHOUT DIFFERENTIAL; Future    3. Colon cancer screening  Referral discussed and placed  - Referral to Gastroenterology        recheck one year, sooner as needed

## 2022-05-16 ENCOUNTER — HOSPITAL ENCOUNTER (OUTPATIENT)
Dept: RADIOLOGY | Facility: MEDICAL CENTER | Age: 52
End: 2022-05-16
Attending: FAMILY MEDICINE
Payer: COMMERCIAL

## 2022-05-16 DIAGNOSIS — Z12.31 ENCOUNTER FOR SCREENING MAMMOGRAM FOR MALIGNANT NEOPLASM OF BREAST: ICD-10-CM

## 2022-05-16 PROCEDURE — 77063 BREAST TOMOSYNTHESIS BI: CPT

## 2022-06-16 ENCOUNTER — TELEPHONE (OUTPATIENT)
Dept: MEDICAL GROUP | Facility: MEDICAL CENTER | Age: 52
End: 2022-06-16
Payer: COMMERCIAL

## 2022-06-16 NOTE — TELEPHONE ENCOUNTER
Pt called requesting referral be placed to [facility] to have her colonoscopy completed.     Please advise.

## 2022-06-16 NOTE — TELEPHONE ENCOUNTER
There is no GI group called [facility].  The GI groups are [facility] or [facility].  [facility] is at [facility] near the [facility] office.  [facility] has offices here in [facility], [facility] and [place].  Please ask her which she would prefer.

## 2022-12-13 ENCOUNTER — HOSPITAL ENCOUNTER (OUTPATIENT)
Dept: LAB | Facility: MEDICAL CENTER | Age: 52
End: 2022-12-13
Attending: FAMILY MEDICINE
Payer: COMMERCIAL

## 2022-12-13 DIAGNOSIS — Z00.00 LABORATORY EXAMINATION ORDERED AS PART OF A ROUTINE GENERAL MEDICAL EXAMINATION: ICD-10-CM

## 2022-12-13 LAB
ALBUMIN SERPL BCP-MCNC: 4.5 G/DL (ref 3.2–4.9)
ALBUMIN/GLOB SERPL: 1.3 G/DL
ALP SERPL-CCNC: 74 U/L (ref 30–99)
ALT SERPL-CCNC: 29 U/L (ref 2–50)
ANION GAP SERPL CALC-SCNC: 10 MMOL/L (ref 7–16)
AST SERPL-CCNC: 23 U/L (ref 12–45)
BILIRUB SERPL-MCNC: 0.5 MG/DL (ref 0.1–1.5)
BUN SERPL-MCNC: 12 MG/DL (ref 8–22)
CALCIUM ALBUM COR SERPL-MCNC: 8.8 MG/DL (ref 8.5–10.5)
CALCIUM SERPL-MCNC: 9.2 MG/DL (ref 8.5–10.5)
CHLORIDE SERPL-SCNC: 104 MMOL/L (ref 96–112)
CHOLEST SERPL-MCNC: 196 MG/DL (ref 100–199)
CO2 SERPL-SCNC: 27 MMOL/L (ref 20–33)
CREAT SERPL-MCNC: 0.52 MG/DL (ref 0.5–1.4)
EST. AVERAGE GLUCOSE BLD GHB EST-MCNC: 120 MG/DL
GFR SERPLBLD CREATININE-BSD FMLA CKD-EPI: 111 ML/MIN/1.73 M 2
GLOBULIN SER CALC-MCNC: 3.4 G/DL (ref 1.9–3.5)
GLUCOSE SERPL-MCNC: 104 MG/DL (ref 65–99)
HBA1C MFR BLD: 5.8 % (ref 4–5.6)
HDLC SERPL-MCNC: 51 MG/DL
LDLC SERPL CALC-MCNC: 120 MG/DL
POTASSIUM SERPL-SCNC: 3.4 MMOL/L (ref 3.6–5.5)
PROT SERPL-MCNC: 7.9 G/DL (ref 6–8.2)
SODIUM SERPL-SCNC: 141 MMOL/L (ref 135–145)
TRIGL SERPL-MCNC: 123 MG/DL (ref 0–149)

## 2022-12-13 PROCEDURE — 80061 LIPID PANEL: CPT

## 2022-12-13 PROCEDURE — 83036 HEMOGLOBIN GLYCOSYLATED A1C: CPT

## 2022-12-13 PROCEDURE — 36415 COLL VENOUS BLD VENIPUNCTURE: CPT

## 2022-12-13 PROCEDURE — 80053 COMPREHEN METABOLIC PANEL: CPT

## 2023-02-07 ENCOUNTER — OFFICE VISIT (OUTPATIENT)
Dept: MEDICAL GROUP | Facility: MEDICAL CENTER | Age: 53
End: 2023-02-07
Payer: COMMERCIAL

## 2023-02-07 VITALS
SYSTOLIC BLOOD PRESSURE: 164 MMHG | HEIGHT: 63 IN | DIASTOLIC BLOOD PRESSURE: 92 MMHG | OXYGEN SATURATION: 99 % | WEIGHT: 151.46 LBS | BODY MASS INDEX: 26.84 KG/M2 | TEMPERATURE: 97.8 F | HEART RATE: 79 BPM

## 2023-02-07 DIAGNOSIS — I10 WHITE COAT SYNDROME WITH DIAGNOSIS OF HYPERTENSION: ICD-10-CM

## 2023-02-07 DIAGNOSIS — Z00.00 LABORATORY EXAMINATION ORDERED AS PART OF A ROUTINE GENERAL MEDICAL EXAMINATION: ICD-10-CM

## 2023-02-07 DIAGNOSIS — Z00.00 ROUTINE GENERAL MEDICAL EXAMINATION AT A HEALTH CARE FACILITY: ICD-10-CM

## 2023-02-07 PROCEDURE — 99396 PREV VISIT EST AGE 40-64: CPT | Performed by: FAMILY MEDICINE

## 2023-02-07 RX ORDER — FERROUS SULFATE 325(65) MG
TABLET ORAL DAILY
COMMUNITY

## 2023-02-07 ASSESSMENT — ENCOUNTER SYMPTOMS
VOMITING: 0
PALPITATIONS: 0
DEPRESSION: 0
SPUTUM PRODUCTION: 0
BRUISES/BLEEDS EASILY: 0
FOCAL WEAKNESS: 0
FEVER: 0
LOSS OF CONSCIOUSNESS: 0
TINGLING: 0
SHORTNESS OF BREATH: 0
SORE THROAT: 0
WEIGHT LOSS: 0
COUGH: 0
HALLUCINATIONS: 0
TREMORS: 0
DIZZINESS: 0
DIARRHEA: 0
SENSORY CHANGE: 0
NECK PAIN: 0
HEADACHES: 0
BLURRED VISION: 0
WEAKNESS: 0
INSOMNIA: 1
ORTHOPNEA: 0
SPEECH CHANGE: 0
SEIZURES: 0
CHILLS: 0
WHEEZING: 0
ABDOMINAL PAIN: 0
NAUSEA: 0
DOUBLE VISION: 0
NERVOUS/ANXIOUS: 0
MYALGIAS: 0
BACK PAIN: 1
DIAPHORESIS: 0
MEMORY LOSS: 0
HEARTBURN: 0
BLOOD IN STOOL: 0

## 2023-02-07 ASSESSMENT — PATIENT HEALTH QUESTIONNAIRE - PHQ9: CLINICAL INTERPRETATION OF PHQ2 SCORE: 0

## 2023-02-07 ASSESSMENT — FIBROSIS 4 INDEX: FIB4 SCORE: 0.87

## 2023-02-07 ASSESSMENT — LIFESTYLE VARIABLES: SUBSTANCE_ABUSE: 0

## 2023-02-07 NOTE — PROGRESS NOTES
Subjective     Ines Soliz is a 52 y.o. female who presents with Annual Exam        She is here for her annual examination    HPI     has a past medical history of Choledocholithiasis with obstruction (6/2014), Dental disorder, Elevated blood pressure complicating pregnancy, antepartum (2002), Family history of diabetes mellitus, Family history of diabetes mellitus in mother, Family history of ischemic heart disease, Fibroid uterus, History of thyroidectomy, subtotal, and Hypertension (4/2012).   has a past surgical history that includes thyroidectomy (1997); ercp in or (6/15/2014); rolando by laparoscopy (6/16/2014); cholecystectomy (6/16/2014); abdominal hysterectomy total (7/11/2017); and salpingectomy (Bilateral, 7/11/2017).  family history includes Diabetes in her mother; Heart Disease (age of onset: 70) in her father; Hypertension in her father.   reports that she has never smoked. She has never used smokeless tobacco. She reports that she does not drink alcohol and does not use drugs.    Patient does check BP at home, weekly.  Has been 125-137 systolic      Current Outpatient Medications:     ferrous sulfate 325 (65 Fe) MG tablet, Take  by mouth every day., Disp: , Rfl:     Red Yeast Rice Extract (RED YEAST RICE PO), Take  by mouth., Disp: , Rfl:     amLODIPine (NORVASC) 5 MG Tab, TAKE 1 TABLET BY MOUTH EVERY DAY, Disp: 90 Tablet, Rfl: 3  has No Known Allergies.      Review of Systems   Constitutional:  Negative for chills, diaphoresis, fever, malaise/fatigue and weight loss.   HENT:  Negative for congestion, hearing loss, sore throat and tinnitus.    Eyes:  Negative for blurred vision and double vision.   Respiratory:  Negative for cough, sputum production, shortness of breath and wheezing.    Cardiovascular:  Negative for chest pain, palpitations, orthopnea and leg swelling.   Gastrointestinal:  Negative for abdominal pain, blood in stool, diarrhea, heartburn, nausea and vomiting.   Genitourinary:   "Negative for dysuria, frequency, hematuria and urgency.   Musculoskeletal:  Positive for back pain. Negative for joint pain, myalgias and neck pain.        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   Neurological:  Negative for dizziness, tingling, tremors, sensory change, speech change, focal weakness, seizures, loss of consciousness, weakness and headaches.   Endo/Heme/Allergies:  Negative for environmental allergies. Does not bruise/bleed easily.   Psychiatric/Behavioral:  Negative for depression, hallucinations, memory loss, substance abuse and suicidal ideas. The patient has insomnia. The patient is not nervous/anxious.         Occasional insomnia.  Stress last pm.  Fraud.             Objective     BP (!) 164/92 (BP Location: Right arm, Patient Position: Sitting, BP Cuff Size: Small adult)   Pulse 79   Temp 36.6 °C (97.8 °F) (Temporal)   Ht 1.6 m (5' 3\")   Wt 68.7 kg (151 lb 7.3 oz)   LMP 06/24/2017   SpO2 99%   BMI 26.83 kg/m²      Physical Exam  Vitals reviewed.   Constitutional:       Appearance: She is well-developed.   HENT:      Head: Normocephalic and atraumatic.   Eyes:      General:         Left eye: No discharge.      Pupils: Pupils are equal, round, and reactive to light.   Neck:      Thyroid: No thyromegaly.      Vascular: No JVD.   Cardiovascular:      Rate and Rhythm: Normal rate and regular rhythm.      Heart sounds: Normal heart sounds. No murmur heard.  Pulmonary:      Effort: Pulmonary effort is normal. No respiratory distress.      Breath sounds: Normal breath sounds. No wheezing or rales.   Abdominal:      General: Bowel sounds are normal. There is no distension.      Palpations: Abdomen is soft. There is no mass.      Tenderness: There is no abdominal tenderness.   Musculoskeletal:         General: Normal range of motion.      Cervical back: Normal range of motion and neck supple.   Lymphadenopathy:      Cervical: No cervical " adenopathy.   Skin:     General: Skin is warm and dry.      Findings: No erythema or rash.   Neurological:      Mental Status: She is alert and oriented to person, place, and time.      Coordination: Coordination normal.   Psychiatric:         Mood and Affect: Mood normal.         Behavior: Behavior normal.     Lab results from 12/13/22 reviewed and discussed, lipids favorable with slight LDL elevation.  A1c shows mild glucose intolerance.  Mildly high fasting glucose.  Kidney function and liver enzymes normal.      Anticipatory guidance;  wears seat belts in vehicles always.  Yearly medical visits suggested.  Declines COVID boosting, will consider if she travels.  Mammogram due in May.  Completed colonoscopy 9/22.  10 year follow up advised.  She is walking a lot at her job, over 15, 000 steps per day.  Does not do other exercise. She watches her sweets, eats a good diet.      Assessment & Plan        1. Routine general medical examination at a health care facility  She is generally well.    2. Laboratory examination ordered as part of a routine general medical examination  Routine orders discussed and placed  - HEMOGLOBIN A1C; Future  - Comp Metabolic Panel; Future  - Lipid Profile; Future  - TSH; Future  - CBC WITHOUT DIFFERENTIAL; Future    3. White coat syndrome with diagnosis of hypertension  At home her blood pressure is good.  Exceptional stress last night.        Recheck one year, sooner as needed

## 2023-02-12 DIAGNOSIS — I10 ESSENTIAL HYPERTENSION: ICD-10-CM

## 2023-02-13 RX ORDER — AMLODIPINE BESYLATE 5 MG/1
TABLET ORAL
Qty: 90 TABLET | Refills: 3 | Status: SHIPPED | OUTPATIENT
Start: 2023-02-13 | End: 2024-01-02 | Stop reason: SDUPTHER

## 2023-07-31 ENCOUNTER — HOSPITAL ENCOUNTER (OUTPATIENT)
Dept: LAB | Facility: MEDICAL CENTER | Age: 53
End: 2023-07-31
Attending: FAMILY MEDICINE
Payer: COMMERCIAL

## 2023-07-31 DIAGNOSIS — Z00.00 LABORATORY EXAMINATION ORDERED AS PART OF A ROUTINE GENERAL MEDICAL EXAMINATION: ICD-10-CM

## 2023-07-31 LAB
ALBUMIN SERPL BCP-MCNC: 4.6 G/DL (ref 3.2–4.9)
ALBUMIN/GLOB SERPL: 1.2 G/DL
ALP SERPL-CCNC: 77 U/L (ref 30–99)
ALT SERPL-CCNC: 32 U/L (ref 2–50)
ANION GAP SERPL CALC-SCNC: 13 MMOL/L (ref 7–16)
AST SERPL-CCNC: 25 U/L (ref 12–45)
BILIRUB SERPL-MCNC: 0.6 MG/DL (ref 0.1–1.5)
BUN SERPL-MCNC: 12 MG/DL (ref 8–22)
CALCIUM ALBUM COR SERPL-MCNC: 9.2 MG/DL (ref 8.5–10.5)
CALCIUM SERPL-MCNC: 9.7 MG/DL (ref 8.5–10.5)
CHLORIDE SERPL-SCNC: 104 MMOL/L (ref 96–112)
CHOLEST SERPL-MCNC: 225 MG/DL (ref 100–199)
CO2 SERPL-SCNC: 26 MMOL/L (ref 20–33)
CREAT SERPL-MCNC: 0.52 MG/DL (ref 0.5–1.4)
ERYTHROCYTE [DISTWIDTH] IN BLOOD BY AUTOMATED COUNT: 37.7 FL (ref 35.9–50)
EST. AVERAGE GLUCOSE BLD GHB EST-MCNC: 123 MG/DL
FASTING STATUS PATIENT QL REPORTED: NORMAL
GFR SERPLBLD CREATININE-BSD FMLA CKD-EPI: 110 ML/MIN/1.73 M 2
GLOBULIN SER CALC-MCNC: 3.7 G/DL (ref 1.9–3.5)
GLUCOSE SERPL-MCNC: 109 MG/DL (ref 65–99)
HBA1C MFR BLD: 5.9 % (ref 4–5.6)
HCT VFR BLD AUTO: 45.9 % (ref 37–47)
HDLC SERPL-MCNC: 59 MG/DL
HGB BLD-MCNC: 15.4 G/DL (ref 12–16)
LDLC SERPL CALC-MCNC: 141 MG/DL
MCH RBC QN AUTO: 28.4 PG (ref 27–33)
MCHC RBC AUTO-ENTMCNC: 33.6 G/DL (ref 32.2–35.5)
MCV RBC AUTO: 84.5 FL (ref 81.4–97.8)
PLATELET # BLD AUTO: 279 K/UL (ref 164–446)
PMV BLD AUTO: 10.4 FL (ref 9–12.9)
POTASSIUM SERPL-SCNC: 3.7 MMOL/L (ref 3.6–5.5)
PROT SERPL-MCNC: 8.3 G/DL (ref 6–8.2)
RBC # BLD AUTO: 5.43 M/UL (ref 4.2–5.4)
SODIUM SERPL-SCNC: 143 MMOL/L (ref 135–145)
TRIGL SERPL-MCNC: 124 MG/DL (ref 0–149)
TSH SERPL DL<=0.005 MIU/L-ACNC: 3.02 UIU/ML (ref 0.38–5.33)
WBC # BLD AUTO: 5.3 K/UL (ref 4.8–10.8)

## 2023-07-31 PROCEDURE — 85027 COMPLETE CBC AUTOMATED: CPT

## 2023-07-31 PROCEDURE — 84443 ASSAY THYROID STIM HORMONE: CPT

## 2023-07-31 PROCEDURE — 80053 COMPREHEN METABOLIC PANEL: CPT

## 2023-07-31 PROCEDURE — 80061 LIPID PANEL: CPT

## 2023-07-31 PROCEDURE — 36415 COLL VENOUS BLD VENIPUNCTURE: CPT

## 2023-07-31 PROCEDURE — 83036 HEMOGLOBIN GLYCOSYLATED A1C: CPT

## 2024-01-02 ENCOUNTER — OFFICE VISIT (OUTPATIENT)
Dept: MEDICAL GROUP | Facility: MEDICAL CENTER | Age: 54
End: 2024-01-02
Payer: COMMERCIAL

## 2024-01-02 VITALS
SYSTOLIC BLOOD PRESSURE: 122 MMHG | RESPIRATION RATE: 18 BRPM | HEIGHT: 63 IN | HEART RATE: 64 BPM | DIASTOLIC BLOOD PRESSURE: 84 MMHG | OXYGEN SATURATION: 98 % | TEMPERATURE: 98 F | BODY MASS INDEX: 26.8 KG/M2 | WEIGHT: 151.24 LBS

## 2024-01-02 DIAGNOSIS — R73.03 PREDIABETES: ICD-10-CM

## 2024-01-02 DIAGNOSIS — Z86.2 HISTORY OF IRON DEFICIENCY ANEMIA: ICD-10-CM

## 2024-01-02 DIAGNOSIS — E78.5 DYSLIPIDEMIA: ICD-10-CM

## 2024-01-02 DIAGNOSIS — I10 ESSENTIAL HYPERTENSION: ICD-10-CM

## 2024-01-02 DIAGNOSIS — L98.9 SKIN LESION OF FOOT: ICD-10-CM

## 2024-01-02 DIAGNOSIS — R73.02 IMPAIRED GLUCOSE TOLERANCE: ICD-10-CM

## 2024-01-02 DIAGNOSIS — Z23 NEED FOR IMMUNIZATION AGAINST INFLUENZA: ICD-10-CM

## 2024-01-02 PROBLEM — R73.01 ELEVATED FASTING GLUCOSE: Status: RESOLVED | Noted: 2018-09-28 | Resolved: 2024-01-02

## 2024-01-02 PROCEDURE — 99214 OFFICE O/P EST MOD 30 MIN: CPT | Mod: 25 | Performed by: FAMILY MEDICINE

## 2024-01-02 PROCEDURE — 90471 IMMUNIZATION ADMIN: CPT | Performed by: FAMILY MEDICINE

## 2024-01-02 PROCEDURE — 3079F DIAST BP 80-89 MM HG: CPT | Performed by: FAMILY MEDICINE

## 2024-01-02 PROCEDURE — 90686 IIV4 VACC NO PRSV 0.5 ML IM: CPT | Performed by: FAMILY MEDICINE

## 2024-01-02 PROCEDURE — 3074F SYST BP LT 130 MM HG: CPT | Performed by: FAMILY MEDICINE

## 2024-01-02 RX ORDER — TRIAMCINOLONE ACETONIDE 1 MG/G
CREAM TOPICAL
Qty: 15 G | Refills: 2 | Status: SHIPPED | OUTPATIENT
Start: 2024-01-02

## 2024-01-02 RX ORDER — AMLODIPINE BESYLATE 5 MG/1
5 TABLET ORAL
Qty: 90 TABLET | Refills: 3 | Status: SHIPPED | OUTPATIENT
Start: 2024-01-02

## 2024-01-02 ASSESSMENT — PATIENT HEALTH QUESTIONNAIRE - PHQ9: CLINICAL INTERPRETATION OF PHQ2 SCORE: 0

## 2024-01-02 ASSESSMENT — FIBROSIS 4 INDEX: FIB4 SCORE: 0.86

## 2024-01-02 NOTE — PROGRESS NOTES
Chief Complaint   Patient presents with    Foot Problem    Hypertension    Prediabetes    Dyslipidemia       Subjective:     HPI:   Ines Soliz presents today with the followin. Skin lesion of foot  First noted october, red skin, very itchy.  Had erythema with a white center.  Itching is severe.  The area now appears thickened and excoriated.  She now has a central eschar.  She will soak 10 minutes every day.  Will use epsom salts.  Cream also prescribed.  If not improving within 2 weeks she will let me know.    2. Essential hypertension  HTN - Chronic condition stable. Currently taking all meds as directed.   She is not taking baby aspirin daily.   She is monitoring BP at home. Checks once weekly.  BP has been good, similar to today  Denies symptoms low BP: light-headed, tunnel-vision, unusual fatigue.   Denies symptoms high BP:pounding headache, visual changes, palpitations, flushed face.   Denies medicine side effects: unusual fatigue, slow heartbeat, foot/leg swelling, cough.    3. Impaired glucose tolerance/Prediabetes  Patient's most recent A1c is 5.9%.  Her mother does have diabetes.  She has a job that involves a great deal of walking which she enjoys.  Patient is trying to maintain good weight and good diet.  Follow-up lab orders discussed and placed.    4. Dyslipidemia  Patient denies chest pain, chest pressure, palpitations or exertional shortness of breath. Patient denies muscle aches or muscle weakness from the over the counter red yeast rice. Patient is a never smoker. Patient takes no aspirin daily. Patient has no history of myocardial infarction, stroke or PVD.  Lab orders discussed and placed.    5. Need for immunization against influenza  Administered today    6. History of iron deficiency anemia  Lab orders discussed and placed.        Patient Active Problem List    Diagnosis Date Noted    Impaired glucose tolerance 2024    Prediabetes 2024    Dyslipidemia 2024  "   Status post partial hysterectomy with remaining cervical stump 09/22/2017    Essential hypertension 04/01/2012    History of iron deficiency anemia 12/05/2011    History of thyroidectomy, subtotal     Family history of ischemic heart disease        Current medicines (including changes today)  Current Outpatient Medications   Medication Sig Dispense Refill    triamcinolone acetonide (KENALOG) 0.1 % Cream Apply to foot lesion bid 15 g 2    amLODIPine (NORVASC) 5 MG Tab Take 1 Tablet by mouth every day. 90 Tablet 3    ferrous sulfate 325 (65 Fe) MG tablet Take  by mouth every day.      Red Yeast Rice Extract (RED YEAST RICE PO) Take  by mouth.       No current facility-administered medications for this visit.       No Known Allergies    ROS: As per HPI       Objective:     /84   Pulse 64   Temp 36.7 °C (98 °F)   Resp 18   Ht 1.6 m (5' 3\")   Wt 68.6 kg (151 lb 3.8 oz)   SpO2 98%  Body mass index is 26.79 kg/m².    Physical Exam:  Constitutional: Well-developed and well-nourished. Not diaphoretic. No distress. Lucid and fluent.  Skin: Skin is warm and dry. Lesion right upper foot.  Itchy lesion, scratched, now has scarring and a central ulcer with a thick eschar.  Head: Atraumatic without lesions.  Eyes: Conjunctivae and extraocular motions are normal. Pupils are equal, round, and reactive to light. No scleral icterus.   Ears:  External ears unremarkable.   Neck: Supple, trachea midline. No thyromegaly present. No cervical or supraclavicular lymphadenopathy. No JVD or carotid bruits appreciated  Cardiovascular: Regular rate and rhythm.  Normal S1, S2 without murmur appreciated.  Chest: Effort normal. Clear to auscultation throughout. No adventitious sounds.   Abdomen: Soft, non tender, and without distention. Active bowel sounds in all four quadrants. No rebound, guarding, masses or hepatosplenomegaly.  Extremities: No cyanosis, clubbing, erythema, nor edema.   Neurological: Alert and oriented x 3. No " tremor.  Psychiatric:  Behavior, mood, and affect are appropriate.       Assessment and Plan:     53 y.o. female with the following issues:    1. Skin lesion of foot  triamcinolone acetonide (KENALOG) 0.1 % Cream      2. Essential hypertension  amLODIPine (NORVASC) 5 MG Tab    Comp Metabolic Panel    Lipid Profile      3. Impaired glucose tolerance  Comp Metabolic Panel    HEMOGLOBIN A1C      4. Prediabetes  Comp Metabolic Panel    CBC WITHOUT DIFFERENTIAL    TSH    HEMOGLOBIN A1C      5. Dyslipidemia  Comp Metabolic Panel    CBC WITHOUT DIFFERENTIAL    TSH    Lipid Profile      6. Need for immunization against influenza  INFLUENZA VACCINE QUAD INJ (PF)      7. History of iron deficiency anemia  CBC WITHOUT DIFFERENTIAL            Followup: Return in about 6 months (around 7/2/2024), or if symptoms worsen or fail to improve.

## 2024-02-20 ENCOUNTER — HOSPITAL ENCOUNTER (OUTPATIENT)
Dept: LAB | Facility: MEDICAL CENTER | Age: 54
End: 2024-02-20
Attending: FAMILY MEDICINE
Payer: COMMERCIAL

## 2024-02-20 DIAGNOSIS — Z86.2 HISTORY OF IRON DEFICIENCY ANEMIA: ICD-10-CM

## 2024-02-20 DIAGNOSIS — E78.5 DYSLIPIDEMIA: ICD-10-CM

## 2024-02-20 DIAGNOSIS — I10 ESSENTIAL HYPERTENSION: ICD-10-CM

## 2024-02-20 DIAGNOSIS — R73.03 PREDIABETES: ICD-10-CM

## 2024-02-20 DIAGNOSIS — R73.02 IMPAIRED GLUCOSE TOLERANCE: ICD-10-CM

## 2024-02-20 LAB
ALBUMIN SERPL BCP-MCNC: 4.2 G/DL (ref 3.2–4.9)
ALBUMIN/GLOB SERPL: 1.1 G/DL
ALP SERPL-CCNC: 74 U/L (ref 30–99)
ALT SERPL-CCNC: 21 U/L (ref 2–50)
ANION GAP SERPL CALC-SCNC: 11 MMOL/L (ref 7–16)
AST SERPL-CCNC: 21 U/L (ref 12–45)
BILIRUB SERPL-MCNC: 0.4 MG/DL (ref 0.1–1.5)
BUN SERPL-MCNC: 10 MG/DL (ref 8–22)
CALCIUM ALBUM COR SERPL-MCNC: 8.9 MG/DL (ref 8.5–10.5)
CALCIUM SERPL-MCNC: 9.1 MG/DL (ref 8.5–10.5)
CHLORIDE SERPL-SCNC: 105 MMOL/L (ref 96–112)
CHOLEST SERPL-MCNC: 198 MG/DL (ref 100–199)
CO2 SERPL-SCNC: 26 MMOL/L (ref 20–33)
CREAT SERPL-MCNC: 0.49 MG/DL (ref 0.5–1.4)
ERYTHROCYTE [DISTWIDTH] IN BLOOD BY AUTOMATED COUNT: 39.8 FL (ref 35.9–50)
EST. AVERAGE GLUCOSE BLD GHB EST-MCNC: 120 MG/DL
FASTING STATUS PATIENT QL REPORTED: NORMAL
GFR SERPLBLD CREATININE-BSD FMLA CKD-EPI: 112 ML/MIN/1.73 M 2
GLOBULIN SER CALC-MCNC: 3.7 G/DL (ref 1.9–3.5)
GLUCOSE SERPL-MCNC: 111 MG/DL (ref 65–99)
HBA1C MFR BLD: 5.8 % (ref 4–5.6)
HCT VFR BLD AUTO: 45.6 % (ref 37–47)
HDLC SERPL-MCNC: 56 MG/DL
HGB BLD-MCNC: 14.9 G/DL (ref 12–16)
LDLC SERPL CALC-MCNC: 110 MG/DL
MCH RBC QN AUTO: 28.1 PG (ref 27–33)
MCHC RBC AUTO-ENTMCNC: 32.7 G/DL (ref 32.2–35.5)
MCV RBC AUTO: 85.9 FL (ref 81.4–97.8)
PLATELET # BLD AUTO: 304 K/UL (ref 164–446)
PMV BLD AUTO: 10.4 FL (ref 9–12.9)
POTASSIUM SERPL-SCNC: 4 MMOL/L (ref 3.6–5.5)
PROT SERPL-MCNC: 7.9 G/DL (ref 6–8.2)
RBC # BLD AUTO: 5.31 M/UL (ref 4.2–5.4)
SODIUM SERPL-SCNC: 142 MMOL/L (ref 135–145)
TRIGL SERPL-MCNC: 158 MG/DL (ref 0–149)
TSH SERPL DL<=0.005 MIU/L-ACNC: 3.41 UIU/ML (ref 0.38–5.33)
WBC # BLD AUTO: 5.3 K/UL (ref 4.8–10.8)

## 2024-02-20 PROCEDURE — 85027 COMPLETE CBC AUTOMATED: CPT

## 2024-02-20 PROCEDURE — 80053 COMPREHEN METABOLIC PANEL: CPT

## 2024-02-20 PROCEDURE — 84443 ASSAY THYROID STIM HORMONE: CPT

## 2024-02-20 PROCEDURE — 36415 COLL VENOUS BLD VENIPUNCTURE: CPT

## 2024-02-20 PROCEDURE — 80061 LIPID PANEL: CPT

## 2024-02-20 PROCEDURE — 83036 HEMOGLOBIN GLYCOSYLATED A1C: CPT

## 2024-09-03 ENCOUNTER — HOSPITAL ENCOUNTER (OUTPATIENT)
Dept: RADIOLOGY | Facility: MEDICAL CENTER | Age: 54
End: 2024-09-03
Attending: FAMILY MEDICINE
Payer: COMMERCIAL

## 2024-09-03 DIAGNOSIS — Z12.31 VISIT FOR SCREENING MAMMOGRAM: ICD-10-CM

## 2024-09-03 PROCEDURE — 77067 SCR MAMMO BI INCL CAD: CPT

## 2024-09-13 ENCOUNTER — APPOINTMENT (OUTPATIENT)
Dept: MEDICAL GROUP | Facility: MEDICAL CENTER | Age: 54
End: 2024-09-13
Payer: COMMERCIAL

## 2024-12-03 ENCOUNTER — APPOINTMENT (OUTPATIENT)
Dept: MEDICAL GROUP | Facility: MEDICAL CENTER | Age: 54
End: 2024-12-03
Payer: COMMERCIAL

## 2024-12-03 VITALS
HEART RATE: 71 BPM | SYSTOLIC BLOOD PRESSURE: 132 MMHG | TEMPERATURE: 98 F | HEIGHT: 63 IN | WEIGHT: 150 LBS | RESPIRATION RATE: 16 BRPM | DIASTOLIC BLOOD PRESSURE: 70 MMHG | OXYGEN SATURATION: 98 % | BODY MASS INDEX: 26.58 KG/M2

## 2024-12-03 DIAGNOSIS — L81.9: ICD-10-CM

## 2024-12-03 DIAGNOSIS — E78.5 DYSLIPIDEMIA: ICD-10-CM

## 2024-12-03 DIAGNOSIS — R73.03 PREDIABETES: ICD-10-CM

## 2024-12-03 DIAGNOSIS — Z23 NEED FOR VACCINATION: ICD-10-CM

## 2024-12-03 DIAGNOSIS — R73.02 IMPAIRED GLUCOSE TOLERANCE: ICD-10-CM

## 2024-12-03 DIAGNOSIS — I10 ESSENTIAL HYPERTENSION: ICD-10-CM

## 2024-12-03 DIAGNOSIS — L98.9 LESION OF SKIN OF FOOT: ICD-10-CM

## 2024-12-03 DIAGNOSIS — E89.0 HISTORY OF THYROIDECTOMY, SUBTOTAL: ICD-10-CM

## 2024-12-03 DIAGNOSIS — Z86.2 HISTORY OF IRON DEFICIENCY ANEMIA: ICD-10-CM

## 2024-12-03 PROCEDURE — 90471 IMMUNIZATION ADMIN: CPT

## 2024-12-03 PROCEDURE — 3075F SYST BP GE 130 - 139MM HG: CPT | Performed by: FAMILY MEDICINE

## 2024-12-03 PROCEDURE — 99214 OFFICE O/P EST MOD 30 MIN: CPT | Mod: 25 | Performed by: FAMILY MEDICINE

## 2024-12-03 PROCEDURE — 90656 IIV3 VACC NO PRSV 0.5 ML IM: CPT

## 2024-12-03 PROCEDURE — 3078F DIAST BP <80 MM HG: CPT | Performed by: FAMILY MEDICINE

## 2024-12-03 RX ORDER — AMLODIPINE BESYLATE 5 MG/1
5 TABLET ORAL
Qty: 90 TABLET | Refills: 3 | Status: SHIPPED | OUTPATIENT
Start: 2024-12-03

## 2024-12-03 ASSESSMENT — FIBROSIS 4 INDEX: FIB4 SCORE: 0.83

## 2024-12-03 NOTE — PROGRESS NOTES
Chief Complaint   Patient presents with    Foot Problem    Skin Discoloration    Skin Lesion    Hypertension    Dyslipidemia       Subjective:     HPI:   Ines Soliz presents today with the followin. Lesion of skin of foot/Unusual skin lesion with pigment  Began to note on right upper foot 10/2023.  Has used creams, epsom salts, no improvement, has enlarged and thickened.  No bleeding.  Also both shins show increased pigmentation and thickened skin.  To dermatology ASAP.  Needs evaluation and biopsy.    2. Prediabetes/Impaired glucose tolerance  Last A1c in February showed mild glycohemoglobin elevation.  Patient has stayed stable.  There is a family history of diabetes and she is quite careful.  Follow-up lab orders discussed and placed.    3. Dyslipidemia  Patient denies chest pain, chest pressure, palpitations or exertional shortness of breath. Patient takes over-the-counter red yeast rice which has reduced her LDL level.  She continues to have mild elevation, below JNC 7 target. Patient is a never smoker. Patient takes no aspirin daily. Patient has no history of myocardial infarction, stroke or PVD.  Lab orders discussed and placed.    4. Essential hypertension  HTN - Chronic condition stable.  Patient tolerates the amlodipine well..   She is not taking baby aspirin daily.   She is not monitoring BP at home.  Blood pressure here today is good.  Patient states it is checked periodically and has been in the 120s to 130s systolic.  Denies symptoms low BP: light-headed, tunnel-vision, unusual fatigue.   Denies symptoms high BP:pounding headache, visual changes, palpitations, flushed face.   Denies medicine side effects: unusual fatigue, slow heartbeat, foot/leg swelling, cough.    5. History of thyroidectomy, subtotal  Patient does have history of partial thyroidectomy.  Follow-up lab order discussed and placed.    6. History of iron deficiency anemia  History of iron deficiency anemia.  Patient is  "somewhat more tired.  Follow-up lab orders discussed and placed.    7. Need for vaccination  Flu shot administered today, trivalent        Patient Active Problem List    Diagnosis Date Noted    Lesion of skin of foot 12/03/2024    Unusual skin lesion with pigment 12/03/2024    Impaired glucose tolerance 01/02/2024    Prediabetes 01/02/2024    Dyslipidemia 01/02/2024    Status post partial hysterectomy with remaining cervical stump 09/22/2017    Essential hypertension 04/01/2012    History of iron deficiency anemia 12/05/2011    History of thyroidectomy, subtotal     Family history of ischemic heart disease        Current medicines (including changes today)  Current Outpatient Medications   Medication Sig Dispense Refill    amLODIPine (NORVASC) 5 MG Tab Take 1 Tablet by mouth every day. 90 Tablet 3    triamcinolone acetonide (KENALOG) 0.1 % Cream Apply to foot lesion bid 15 g 2    ferrous sulfate 325 (65 Fe) MG tablet Take  by mouth every day.      Red Yeast Rice Extract (RED YEAST RICE PO) Take  by mouth.       No current facility-administered medications for this visit.       No Known Allergies    ROS: As per HPI       Objective:     /70   Pulse 71   Temp 36.7 °C (98 °F)   Resp 16   Ht 1.6 m (5' 3\")   Wt 68 kg (150 lb)   SpO2 98%  Body mass index is 26.57 kg/m².    Physical Exam:  Constitutional: Well-developed and well-nourished. Not diaphoretic. No distress. Lucid and fluent.  Skin: Skin is warm and dry. Lesion top of right foot 3 cm with central ulceration and dark pigmentation with skin thickening.  Also both medial shins skin thickening and pigmentation without ulcer.  Head: Atraumatic without lesions.  Eyes: Conjunctivae and extraocular motions are normal. Pupils are equal, round, and reactive to light. No scleral icterus.   Ears:  External ears unremarkable.   Neck: Supple, trachea midline. No thyromegaly present. No cervical or supraclavicular lymphadenopathy. No JVD or carotid bruits " appreciated  Cardiovascular: Regular rate and rhythm.  Normal S1, S2 without murmur appreciated.  Chest: Effort normal. Clear to auscultation throughout. No adventitious sounds.   Abdomen: Soft, non tender, and without distention. Active bowel sounds in all four quadrants. No rebound, guarding, masses or hepatosplenomegaly.  Extremities: No cyanosis, clubbing, erythema, nor edema.   Neurological: Alert and oriented x 3. No tremor appreciated.  Psychiatric:  Behavior, mood, and affect are appropriate.       Assessment and Plan:     54 y.o. female with the following issues:    1. Lesion of skin of foot  Referral to Dermatology      2. Unusual skin lesion with pigment  Referral to Dermatology    CORTISOL      3. Prediabetes  Comp Metabolic Panel    HEMOGLOBIN A1C      4. Impaired glucose tolerance  Comp Metabolic Panel    HEMOGLOBIN A1C      5. Dyslipidemia  Comp Metabolic Panel    CBC WITHOUT DIFFERENTIAL    TSH    Lipid Profile      6. Essential hypertension  Comp Metabolic Panel    Lipid Profile    amLODIPine (NORVASC) 5 MG Tab      7. History of thyroidectomy, subtotal  TSH      8. History of iron deficiency anemia  CBC WITHOUT DIFFERENTIAL      9. Need for vaccination  INFLUENZA VACCINE TRI INJ (PF)             Followup: Return in about 4 months (around 4/3/2025), or if symptoms worsen or fail to improve.

## 2024-12-23 ENCOUNTER — HOSPITAL ENCOUNTER (OUTPATIENT)
Dept: LAB | Facility: MEDICAL CENTER | Age: 54
End: 2024-12-23
Attending: FAMILY MEDICINE
Payer: COMMERCIAL

## 2024-12-23 DIAGNOSIS — Z86.2 HISTORY OF IRON DEFICIENCY ANEMIA: ICD-10-CM

## 2024-12-23 DIAGNOSIS — E78.5 DYSLIPIDEMIA: ICD-10-CM

## 2024-12-23 DIAGNOSIS — R73.03 PREDIABETES: ICD-10-CM

## 2024-12-23 DIAGNOSIS — E89.0 HISTORY OF THYROIDECTOMY, SUBTOTAL: ICD-10-CM

## 2024-12-23 DIAGNOSIS — L81.9: ICD-10-CM

## 2024-12-23 DIAGNOSIS — R73.02 IMPAIRED GLUCOSE TOLERANCE: ICD-10-CM

## 2024-12-23 DIAGNOSIS — I10 ESSENTIAL HYPERTENSION: ICD-10-CM

## 2024-12-23 LAB
ALBUMIN SERPL BCP-MCNC: 4.4 G/DL (ref 3.2–4.9)
ALBUMIN/GLOB SERPL: 1.3 G/DL
ALP SERPL-CCNC: 71 U/L (ref 30–99)
ALT SERPL-CCNC: 26 U/L (ref 2–50)
ANION GAP SERPL CALC-SCNC: 9 MMOL/L (ref 7–16)
AST SERPL-CCNC: 26 U/L (ref 12–45)
BILIRUB SERPL-MCNC: 0.5 MG/DL (ref 0.1–1.5)
BUN SERPL-MCNC: 15 MG/DL (ref 8–22)
CALCIUM ALBUM COR SERPL-MCNC: 9.1 MG/DL (ref 8.5–10.5)
CALCIUM SERPL-MCNC: 9.4 MG/DL (ref 8.5–10.5)
CHLORIDE SERPL-SCNC: 105 MMOL/L (ref 96–112)
CHOLEST SERPL-MCNC: 182 MG/DL (ref 100–199)
CO2 SERPL-SCNC: 26 MMOL/L (ref 20–33)
CORTIS SERPL-MCNC: 6.4 UG/DL (ref 0–23)
CREAT SERPL-MCNC: 0.54 MG/DL (ref 0.5–1.4)
ERYTHROCYTE [DISTWIDTH] IN BLOOD BY AUTOMATED COUNT: 38.6 FL (ref 35.9–50)
EST. AVERAGE GLUCOSE BLD GHB EST-MCNC: 120 MG/DL
GFR SERPLBLD CREATININE-BSD FMLA CKD-EPI: 108 ML/MIN/1.73 M 2
GLOBULIN SER CALC-MCNC: 3.5 G/DL (ref 1.9–3.5)
GLUCOSE SERPL-MCNC: 112 MG/DL (ref 65–99)
HBA1C MFR BLD: 5.8 % (ref 4–5.6)
HCT VFR BLD AUTO: 45.6 % (ref 37–47)
HDLC SERPL-MCNC: 54 MG/DL
HGB BLD-MCNC: 14.8 G/DL (ref 12–16)
LDLC SERPL CALC-MCNC: 104 MG/DL
MCH RBC QN AUTO: 27.9 PG (ref 27–33)
MCHC RBC AUTO-ENTMCNC: 32.5 G/DL (ref 32.2–35.5)
MCV RBC AUTO: 86 FL (ref 81.4–97.8)
PLATELET # BLD AUTO: 282 K/UL (ref 164–446)
PMV BLD AUTO: 10.8 FL (ref 9–12.9)
POTASSIUM SERPL-SCNC: 3.8 MMOL/L (ref 3.6–5.5)
PROT SERPL-MCNC: 7.9 G/DL (ref 6–8.2)
RBC # BLD AUTO: 5.3 M/UL (ref 4.2–5.4)
SODIUM SERPL-SCNC: 140 MMOL/L (ref 135–145)
TRIGL SERPL-MCNC: 121 MG/DL (ref 0–149)
TSH SERPL-ACNC: 2.55 UIU/ML (ref 0.35–5.5)
WBC # BLD AUTO: 5.5 K/UL (ref 4.8–10.8)

## 2024-12-23 PROCEDURE — 80061 LIPID PANEL: CPT

## 2024-12-23 PROCEDURE — 85027 COMPLETE CBC AUTOMATED: CPT

## 2024-12-23 PROCEDURE — 36415 COLL VENOUS BLD VENIPUNCTURE: CPT

## 2024-12-23 PROCEDURE — 80053 COMPREHEN METABOLIC PANEL: CPT

## 2024-12-23 PROCEDURE — 83036 HEMOGLOBIN GLYCOSYLATED A1C: CPT

## 2024-12-23 PROCEDURE — 84443 ASSAY THYROID STIM HORMONE: CPT

## 2024-12-23 PROCEDURE — 82533 TOTAL CORTISOL: CPT

## 2025-01-07 ENCOUNTER — OFFICE VISIT (OUTPATIENT)
Dept: DERMATOLOGY | Facility: IMAGING CENTER | Age: 55
End: 2025-01-07
Payer: COMMERCIAL

## 2025-01-07 DIAGNOSIS — L30.9 DERMATITIS: ICD-10-CM

## 2025-01-07 PROBLEM — L98.9 LESION OF SKIN OF FOOT: Status: RESOLVED | Noted: 2024-12-03 | Resolved: 2025-01-07

## 2025-01-07 PROBLEM — L81.9: Status: RESOLVED | Noted: 2024-12-03 | Resolved: 2025-01-07

## 2025-01-07 PROCEDURE — 99203 OFFICE O/P NEW LOW 30 MIN: CPT | Mod: 25 | Performed by: STUDENT IN AN ORGANIZED HEALTH CARE EDUCATION/TRAINING PROGRAM

## 2025-01-07 PROCEDURE — 11104 PUNCH BX SKIN SINGLE LESION: CPT | Performed by: STUDENT IN AN ORGANIZED HEALTH CARE EDUCATION/TRAINING PROGRAM

## 2025-01-07 PROCEDURE — 11105 PUNCH BX SKIN EA SEP/ADDL: CPT | Performed by: STUDENT IN AN ORGANIZED HEALTH CARE EDUCATION/TRAINING PROGRAM

## 2025-01-07 NOTE — PROGRESS NOTES
[facility] Note    Chief Complaint   Patient presents with    Establish Care    Rash     Top of rt foot. It was itchy and red. Was applying triamcinolone. Less itchy       HPI:      Ines Soliz is a 54 y.o. female here for evaluation of skin rash.      - started: many years ago on medial ankles, top of right foot. Slowly progressing over time   - symptoms: sometimes itchy, but overall asymptomatic    - prior treatments: topical triamcinolone ointment, didn;t help much     - alleviating factors: none   - worsening factors: none    - environmental exposures: none    - medications changes: on amlodipine, not a recent change        ROS: no fevers/chills. Other pertinent positives and negatives as above.     Meds/PMH/PSH/FamHx/Allergies: reviewed relevant to my specialty in the chart.          PHYSICAL EXAM, ASSESSMENT, & PLAN (per problem):   A focused skin exam was performed including the affected areas of the bilateral ankles and feet. Notable findings on exam today listed below and/or in assessment/plan.      Dermatitis NOS - ddx morphea vs NLD vs vasculitis vs infectious   Exam: Bound down hyperpigmented plaques on bilateral medial ankles, dorsal right foot (with central ulceration and heme-crusting)      - Recommend biopsy to help clarify diagnosis, tailor treatment options. Patient agreeable. See procedure note.   - Further treatment recommendations pending biopsy results     Punch Biopsy Procedure Note x2:  Size: 3mm  Location: left medial ankle, right dorsal foot  Risks, benefits and alternatives of procedure discussed, verbal consent obtained for photo (see chart) and written informed consent obtained for procedure. Time out completed. Area of biopsy prepped with alcohol. Anesthesia with 1% lidocaine with epinephrine administered intradermally with a 30 gauge needle. Punch biopsy of site performed. Hemostasis achieved with pressure and 4.0 prolene sutures. Vaseline applied to wound with  bandage. Patient tolerated procedure well, and there were no complications.  The pathology specimen was sent to the lab via the staff.  Wound care discussed. Please call clinic if post procedure complications such as bleeding at home, concern for infection.         Follow up: pending biopsy results       Sulma Mayberry MD   [facility]

## 2025-01-14 ENCOUNTER — OFFICE VISIT (OUTPATIENT)
Dept: DERMATOLOGY | Facility: IMAGING CENTER | Age: 55
End: 2025-01-14
Payer: COMMERCIAL

## 2025-01-14 DIAGNOSIS — L94.0 MORPHEA: ICD-10-CM

## 2025-01-14 DIAGNOSIS — L30.9 DERMATITIS: ICD-10-CM

## 2025-01-14 PROCEDURE — 11900 INJECT SKIN LESIONS </W 7: CPT | Performed by: STUDENT IN AN ORGANIZED HEALTH CARE EDUCATION/TRAINING PROGRAM

## 2025-01-14 PROCEDURE — 99214 OFFICE O/P EST MOD 30 MIN: CPT | Mod: 25 | Performed by: STUDENT IN AN ORGANIZED HEALTH CARE EDUCATION/TRAINING PROGRAM

## 2025-01-14 RX ORDER — CLOBETASOL PROPIONATE 0.5 MG/G
OINTMENT TOPICAL
Qty: 60 G | Refills: 3 | Status: SHIPPED | OUTPATIENT
Start: 2025-01-14

## 2025-01-14 NOTE — PROGRESS NOTES
"[facility] Note    No chief complaint on file.      HPI:      Ines Soliz is a 54 y.o. female here for follow up, discussion of biopsy results.      Notes biopsy sites are healing ok; is having some redness around spot on right foot and swelling.     Rash History:   - started: many years ago on medial ankles, top of right foot. Slowly progressing over time   - symptoms: sometimes itchy, but overall asymptomatic    - prior treatments: topical triamcinolone ointment, didn;t help much     - alleviating factors: none   - worsening factors: none    - environmental exposures: none    - medications changes: on amlodipine, not a recent change        ROS: no fevers/chills. Other pertinent positives and negatives as above.     Meds/PMH/PSH/FamHx/Allergies: reviewed relevant to my specialty in the chart.          PHYSICAL EXAM, ASSESSMENT, & PLAN (per problem):   A focused skin exam was performed including the affected areas of the bilateral ankles and feet. Notable findings on exam today listed below and/or in assessment/plan.      Morphea   Exam: Bound down hyperpigmented plaques on bilateral medial ankles, dorsal right foot (with central ulceration and heme-crusting)    - discussed etiology, treatment options, prognosis - provided printed hand out from [employer]  - discussed this is a \"localized\" form of an autoimmune condition similar to scleroderma, however does not have implications of involvement of internal organs   - treatment aimed at reducing inflammation and sclerosis of skin   - options for treatment include local treatments like intralesional steroids, vs systemic immunosuppressants like methotrexate  - she opts for injections today, topical treatments, hold systems in reserve    INTRALESIONAL KENALOG:  Discussed risks and benefits of intralesional kenalog injections, including skin atrophy, discoloration, minimal risk of infection. Patient verbally agreed. ILK 10mg/cc x 0.7cc injected " into the lesion on the right foot, left shin. Patient tolerated procedure well, no complications. Aftercare discussed.          Follow up: 6 weeks for rash       Sulma Mayberry MD   [facility]

## 2025-02-24 ENCOUNTER — HOSPITAL ENCOUNTER (OUTPATIENT)
Facility: MEDICAL CENTER | Age: 55
End: 2025-02-24
Attending: STUDENT IN AN ORGANIZED HEALTH CARE EDUCATION/TRAINING PROGRAM
Payer: COMMERCIAL

## 2025-02-24 ENCOUNTER — OFFICE VISIT (OUTPATIENT)
Dept: DERMATOLOGY | Facility: IMAGING CENTER | Age: 55
End: 2025-02-24
Payer: COMMERCIAL

## 2025-02-24 DIAGNOSIS — L98.492 SKIN ULCER WITH FAT LAYER EXPOSED (HCC): ICD-10-CM

## 2025-02-24 DIAGNOSIS — I99.9 VASCULOPATHY: ICD-10-CM

## 2025-02-24 PROCEDURE — 87205 SMEAR GRAM STAIN: CPT

## 2025-02-24 PROCEDURE — 87070 CULTURE OTHR SPECIMN AEROBIC: CPT

## 2025-02-24 PROCEDURE — 87076 CULTURE ANAEROBE IDENT EACH: CPT

## 2025-02-24 PROCEDURE — 87077 CULTURE AEROBIC IDENTIFY: CPT | Mod: 91

## 2025-02-24 PROCEDURE — 87186 SC STD MICRODIL/AGAR DIL: CPT

## 2025-02-24 PROCEDURE — 99214 OFFICE O/P EST MOD 30 MIN: CPT | Performed by: STUDENT IN AN ORGANIZED HEALTH CARE EDUCATION/TRAINING PROGRAM

## 2025-02-24 RX ORDER — TIMOLOL MALEATE 5 MG/ML
SOLUTION/ DROPS OPHTHALMIC
Qty: 10 ML | Refills: 1 | Status: SHIPPED | OUTPATIENT
Start: 2025-02-24 | End: 2025-02-25

## 2025-02-24 RX ORDER — CEPHALEXIN 500 MG/1
500 CAPSULE ORAL 3 TIMES DAILY
Qty: 21 CAPSULE | Refills: 0 | Status: SHIPPED | OUTPATIENT
Start: 2025-02-24

## 2025-02-24 NOTE — PROGRESS NOTES
"[facility] Note    Chief Complaint   Patient presents with    Follow-Up    Rash       HPI:      Ines Soliz is a 54 y.o. female here for follow up. At last visit, had small amounts of ILK and continued on topical steroids. Since last visit, notes worsening ulceration on right foot. Shins continue to be asymptomatic/unaffected. She last applied topical clobetasol on Friday but otherwise is not treating with anything. Pain is severe in foot - had to skip work on Saturday as she is unable to walk.       Rash History:   - started: many years ago on medial ankles, top of right foot. Slowly progressing over time   - symptoms: sometimes itchy, but overall asymptomatic    - prior treatments: topical triamcinolone ointment, didn;t help much     - alleviating factors: none   - worsening factors: none    - environmental exposures: none    - medications changes: on amlodipine, not a recent change    - Biopsy 1/07 showed \"morphea\" with sclerotic dermis   - 1/14 had ILK to plaques on bilateral shins, right dorsal foot       ROS: no fevers/chills. Other pertinent positives and negatives as above.     Meds/PMH/PSH/FamHx/Allergies: reviewed relevant to my specialty in the chart.          PHYSICAL EXAM, ASSESSMENT, & PLAN (per problem):   A focused skin exam was performed including the affected areas of the bilateral ankles and feet. Notable findings on exam today listed below and/or in assessment/plan.    Ulceration on right dorsal foot - with fat layer exposed, surrounding erythema   Concern for SSTI vs inflammatory ulceration (PG, vascular etiology like livedoid vasculopathy or other)   Exam:  Bound down hyperpigmented plaques on bilateral medial ankles, dorsal foot with 1cm sharp ulceratio with some stellate borders,some violaceous rolled borders and white discoloration. Yellow fibrinous base. Surrounded erythema and edema that is tender to touch.     - previous biopsies showed \"morphea\" with sclerotic " dermis, compressed adnexal structures. Punch biopsies were shallow - concern that there may be underlying vascular pathology that was not elucidated on biopsy (vasculitis, vasculopathy)   - will order vasculopathy lab work up (cryos, anti phospholipid syndrome Abs, protein C/S/ antithrombin, PT/PTT  - bacterial swab today   - start keflex 500 mg three times daily x 7 days   - hold repeat biopsy, culture in reserve if not improving by next week   - treatments to consider: pentoxyfylline, low dose ASA, hydroxychloroquine   - start topical timolol drops to center of ulceration       Follow up: 1 week for foot ulcer, concern for vasculopathy       Sulma Mayberry MD   [facility]

## 2025-02-24 NOTE — LETTER
February 24, 2025         Patient: Ines Soliz   YOB: 1970   Date of Visit: 2/24/2025           To Whom it May Concern:    Ines Soliz was seen in my clinic on 2/24/2025. Please excuse from work until after one week, on 3/4, due to skin problem.     If you have any questions or concerns, please don't hesitate to call.        Sincerely,           Sulma Mayberry M.D.  Electronically Signed

## 2025-02-25 ENCOUNTER — RESULTS FOLLOW-UP (OUTPATIENT)
Dept: DERMATOLOGY | Facility: IMAGING CENTER | Age: 55
End: 2025-02-25

## 2025-02-25 ENCOUNTER — HOSPITAL ENCOUNTER (OUTPATIENT)
Dept: LAB | Facility: MEDICAL CENTER | Age: 55
End: 2025-02-25
Attending: STUDENT IN AN ORGANIZED HEALTH CARE EDUCATION/TRAINING PROGRAM
Payer: COMMERCIAL

## 2025-02-25 DIAGNOSIS — I99.9 VASCULOPATHY: ICD-10-CM

## 2025-02-25 LAB
GRAM STN SPEC: NORMAL
SIGNIFICANT IND 70042: NORMAL
SITE SITE: NORMAL
SOURCE SOURCE: NORMAL

## 2025-02-25 PROCEDURE — 85303 CLOT INHIBIT PROT C ACTIVITY: CPT

## 2025-02-25 PROCEDURE — 86160 COMPLEMENT ANTIGEN: CPT

## 2025-02-25 PROCEDURE — 86038 ANTINUCLEAR ANTIBODIES: CPT

## 2025-02-25 PROCEDURE — 85300 ANTITHROMBIN III ACTIVITY: CPT

## 2025-02-25 PROCEDURE — 81241 F5 GENE: CPT

## 2025-02-25 PROCEDURE — 85730 THROMBOPLASTIN TIME PARTIAL: CPT

## 2025-02-25 PROCEDURE — 82595 ASSAY OF CRYOGLOBULIN: CPT

## 2025-02-25 PROCEDURE — 85306 CLOT INHIBIT PROT S FREE: CPT

## 2025-02-25 PROCEDURE — 36415 COLL VENOUS BLD VENIPUNCTURE: CPT

## 2025-02-25 PROCEDURE — 82232 ASSAY OF BETA-2 PROTEIN: CPT

## 2025-02-25 PROCEDURE — 85613 RUSSELL VIPER VENOM DILUTED: CPT

## 2025-02-25 PROCEDURE — 85610 PROTHROMBIN TIME: CPT

## 2025-02-25 PROCEDURE — 86431 RHEUMATOID FACTOR QUANT: CPT

## 2025-02-25 PROCEDURE — 86147 CARDIOLIPIN ANTIBODY EA IG: CPT

## 2025-02-25 PROCEDURE — 85520 HEPARIN ASSAY: CPT

## 2025-02-27 ENCOUNTER — OFFICE VISIT (OUTPATIENT)
Dept: DERMATOLOGY | Facility: IMAGING CENTER | Age: 55
End: 2025-02-27
Payer: COMMERCIAL

## 2025-02-27 DIAGNOSIS — L98.492 SKIN ULCER WITH FAT LAYER EXPOSED (HCC): ICD-10-CM

## 2025-02-27 DIAGNOSIS — L94.0 MORPHEA: ICD-10-CM

## 2025-02-27 DIAGNOSIS — I99.9 VASCULOPATHY: ICD-10-CM

## 2025-02-27 LAB
APTT PPP: 34.1 SEC (ref 24.7–36)
B2 MICROGLOB SERPL-MCNC: 1.7 MG/L (ref 0.8–2.4)
C3 SERPL-MCNC: 200 MG/DL (ref 90–180)
C4 SERPL-MCNC: 43 MG/DL (ref 10–40)
INR PPP: 1.03 (ref 0.87–1.13)
LA PPP-IMP: NORMAL
LMWH PPP CHRO-ACNC: <0.1 U/ML
NUCLEAR IGG SER QL IA: ABNORMAL
PROTHROMBIN TIME: 13.5 SEC (ref 12–14.6)
RHEUMATOID FACT SER NEPH-ACNC: 10 IU/ML (ref 0–14)
SCREEN DRVVT: 46.5 SEC (ref 28–48)

## 2025-02-27 NOTE — PROGRESS NOTES
"[facility] Note    Chief Complaint   Patient presents with    Follow-Up       HPI:      Ines Soliz is a 54 y.o. female here for follow up of morphea, foot ulcers.     She was seen for acute visit 3 days ago 2/24/25 for rapidly developing ulcer on right foot  - concern for SSTI vs inflammatory condition (pyoderma gangrenosum, vasculitis/vasculopathy). She was started on course of keflex 500 mg three times daily and topical timolol drops. Since then, she notes that the inflammation on her foot seems to be improving. She has been taking medications as prescribed. Pain and swelling have improved significantly.     Denies fevers, muscles aches or pain, stomach pain, other atypical or new symptoms. She is otherwise feeling well other than above.       Derm History:   - Started many years ago on medial ankles, top of right foot. Slowly progressing over time.  Sometimes itchy. Previous treatments include topical triamcinolone ointment, didn't help much. Oral meds include amlodipine only.   - Established here 1/07, punch biopsy x2 of left shin and right dorsal foot showed \"consistent with morphea\" with sclerotic dermis and thinned epidermis.   - 1/14/25: ILK 10 mg/cc to plaques on bilateral shins, right dorsal foot. Declined oral therapy (mtx) at this time.         ROS: no fevers/chills. Other pertinent positives and negatives as above.     Meds/PMH/PSH/FamHx/Allergies: reviewed relevant to my specialty in the chart.          PHYSICAL EXAM, ASSESSMENT, & PLAN (per problem):   A focused skin exam was performed including the affected areas of the bilateral ankles and feet. Notable findings on exam today listed below and/or in assessment/plan.      Ulceration on right dorsal foot - with fat layer exposed, surrounding erythema   Concern for SSTI vs inflammatory ulceration (pyoderma gangrenosum, vs livedoid vasculopathy vs other)   Exam:  Bound down hyperpigmented plaques on bilateral medial ankles, " "dorsal foot with 1cm sharp ulceratio with some stellate borders,some violaceous rolled borders and white discoloration. Yellow fibrinous base. Surrounded erythema and edema that is tender to touch.     - previous biopsies showed \"morphea\" with sclerotic dermis, compressed adnexal structures. Punch biopsies were shallow - concern that there may be underlying vascular pathology that was not elucidated on biopsy (vasculitis, vasculopathy)   - complete course of keflex 500 mg three times daily x 7 days - ending next Monday   - continue with topical timolol drops to center of ulceration to aid with wound healing   - hypercoagulable work up pending (cryos, anti phospholipid syndrome Abs, protein C/S/ antithrombin, PT/PTT)   - would culture from 2/24/25 showing normal skin arelis only   - hold repeat biopsies for H&E/culture in reserve if not improving   - ER precautions reviewed       Morphea, bilateral shins and right foot   - see above for acute mgmt of ulceration of foot. Unclear if has secondary pathology in second foot resulting in ulceration, would be uncommon for morphea to ulcerate to this degree and be this inflammatory.   - will re-evaluate next week for possible systemic     Follow up: as scheduled for next Monday       Sulma Mayberry MD   [facility]           "

## 2025-02-28 LAB
BACTERIA WND AEROBE CULT: ABNORMAL
CARDIOLIPIN IGA SER IA-ACNC: <10 APL
CARDIOLIPIN IGG SER IA-ACNC: <10 GPL
CARDIOLIPIN IGM SER IA-ACNC: <10 MPL
GRAM STN SPEC: ABNORMAL
PROT C ACT/NOR PPP: 186 % (ref 83–168)
PROT S ACT/NOR PPP: 107 % (ref 57–131)
SIGNIFICANT IND 70042: ABNORMAL
SITE SITE: ABNORMAL
SOURCE SOURCE: ABNORMAL

## 2025-03-01 LAB
AT III ACT/NOR PPP CHRO: 132 % (ref 76–128)
BACTERIA WND AEROBE CULT: ABNORMAL
GRAM STN SPEC: ABNORMAL
SIGNIFICANT IND 70042: ABNORMAL
SITE SITE: ABNORMAL
SOURCE SOURCE: ABNORMAL

## 2025-03-02 LAB — CRYOGLOB SER QL 3D COLD INC: NORMAL

## 2025-03-03 ENCOUNTER — APPOINTMENT (OUTPATIENT)
Dept: DERMATOLOGY | Facility: IMAGING CENTER | Age: 55
End: 2025-03-03
Payer: COMMERCIAL

## 2025-03-03 ENCOUNTER — RESULTS FOLLOW-UP (OUTPATIENT)
Dept: DERMATOLOGY | Facility: IMAGING CENTER | Age: 55
End: 2025-03-03

## 2025-03-03 DIAGNOSIS — L30.9 DERMATITIS: ICD-10-CM

## 2025-03-03 DIAGNOSIS — L98.492 SKIN ULCER WITH FAT LAYER EXPOSED (HCC): ICD-10-CM

## 2025-03-03 LAB — F5 P.R506Q BLD/T QL: NEGATIVE

## 2025-03-03 PROCEDURE — 99214 OFFICE O/P EST MOD 30 MIN: CPT | Performed by: STUDENT IN AN ORGANIZED HEALTH CARE EDUCATION/TRAINING PROGRAM

## 2025-03-03 RX ORDER — PREDNISONE 20 MG/1
TABLET ORAL
Qty: 24 TABLET | Refills: 0 | Status: SHIPPED | OUTPATIENT
Start: 2025-03-03 | End: 2025-03-15

## 2025-03-03 RX ORDER — CEPHALEXIN 500 MG/1
500 CAPSULE ORAL 3 TIMES DAILY
Qty: 21 CAPSULE | Refills: 0 | Status: SHIPPED | OUTPATIENT
Start: 2025-03-03 | End: 2025-03-27

## 2025-03-03 NOTE — LETTER
March 3, 2025         Patient: Ines Soliz   YOB: 1970   Date of Visit: 3/3/2025           To Whom it May Concern:    Ines Soliz was seen in my clinic on 3/3/2025. Please excuse from work until I am able to evaluate her again at next appointment on Thursday, 3/13. She is being treated for a foot ulcer and infection which limits ability to bear weight, walk.     If you have any questions or concerns, please don't hesitate to call.        Sincerely,         Sulma Mayberry M.D.  Electronically Signed

## 2025-03-03 NOTE — PROGRESS NOTES
"[facility] Clinic Note    Chief Complaint   Patient presents with    Follow-Up       HPI:      Ines Soliz is a 54 y.o. female here for follow up of morphea, foot ulcers.     She was seen 2/24/25 for rapidly developing ulcer on right foot  - concern for SSTI vs inflammatory condition (pyoderma gangrenosum, vasculitis/vasculopathy). She was started on course of keflex 500 mg three times daily and topical timolol drops. Since then, she notes that the inflammation on her foot has continued to improve, less pain. However continues to have ulceration on foot.      Denies fevers, muscles aches or pain, stomach pain, other atypical or new symptoms. She is otherwise feeling well other than above.       Derm History:   - Started many years ago on medial ankles, top of right foot. Slowly progressing over time.  Sometimes itchy. Previous treatments include topical triamcinolone ointment, didn't help much. Oral meds include amlodipine only.   - Established here 1/07, punch biopsy x2 of left shin and right dorsal foot showed \"consistent with morphea\" with sclerotic dermis and thinned epidermis.   - 1/14/25: ILK 10 mg/cc to plaques on bilateral shins, right dorsal foot. Declined oral therapy (mtx) at this time.      Latest Reference Range & Units 02/25/25 11:35   INR 0.87 - 1.13  1.03   PT 12.0 - 14.6 sec 13.5   APTT 24.7 - 36.0 sec 34.1   Heparin Xa (LMWH) U/mL <0.1   Dilute Jose Luis Viper Venom Ag 28.0 - 48.0 sec 46.5   Lupus Anticoagulant  Not Present   Protein C Functional 83 - 168 % 186 (H)   Protein S-Functional 57 - 131 % 107   Antithrombin III, Functional 76 - 128 % 132 (H)   Anti-Cardiolipin Ab Iga <=11 APL <10   Anti-Cardiolipin Ab Igm <=12 MPL <10   Anti-Cariolipin Ab Igg <=14 GPL <10   Beta 2 Microglobulin 0.8 - 2.4 mg/L 1.7   C3 Complement 90 - 180 mg/dL 200 (H)   Complement C4 10 - 40 mg/dL 43 (H)   (H): Data is abnormally high      ROS: no fevers/chills. Other pertinent positives and negatives as " "above.     Meds/PMH/PSH/FamHx/Allergies: reviewed relevant to my specialty in the chart.          PHYSICAL EXAM, ASSESSMENT, & PLAN (per problem):   A focused skin exam was performed including the affected areas of the bilateral ankles and feet. Notable findings on exam today listed below and/or in assessment/plan.      Ulceration on right dorsal foot - with fat layer exposed, surrounding erythema   Concern for inflammatory ulceration (pyoderma gangrenosum, vs livedoid vasculopathy vs other) - may have been superinfected SSTI given improvement with keflex   Exam:  Bound down hyperpigmented plaques on bilateral medial ankles, dorsal foot with 1cm sharp ulceratio with some stellate borders,some violaceous rolled borders and white discoloration. Yellow fibrinous base. Surrounded erythema and edema improved compared to prior photos     - previous biopsies showed \"morphea\" with sclerotic dermis, compressed adnexal structures. Punch biopsies were shallow - concern that there may be underlying vascular pathology that was not elucidated on biopsy (vasculitis, vasculopathy)   - start prednisone 60 mg x 4 days, 40 mg x 4 days, 20 mg x 4 days - may bridge to other medication pending response    - baseline blood pressure, blood glucose wnl  - common side effects of prednisone including, but not limited to, mild GI disturbance, alteration in mood/aggitation/difficulty sleeping, elevated blood pressure, increased appetite and weight gain, increased blood glucose level discussed. Additionally discussed more long-term risks including, but not limited to, osteoporosis/osteonecrosis risk, as well as cataracts/glaucoma risk, steroid withdrawl, cushingoid changes, PUD, reactivation of infections, which are unlikely with this short treatment course.    - extend course of keflex 500 mg three times daily for additional 7 days (14 days total)   - continue with topical timolol drops to center of ulceration to aid with wound healing   - " hypercoagulable work up unremarkable (cryos, anti phospholipid syndrome Abs, protein C/S/ antithrombin, PT/PTT). A few markers were slightly elevated but not significantly.   - would culture from 2/24/25 showing normal skin arelis only   - hold repeat biopsies for H&E/culture in reserve if not improving   - ER precautions reviewed       Morphea, bilateral shins and right foot   - see above for acute mgmt of ulceration of foot. Unclear if has secondary pathology in second foot resulting in ulceration, would be uncommon for morphea to ulcerate to this degree and be this inflammatory. Consider panniculitis? PAN?   - repeat biopsies in reserve     Follow up: Return in 10 days (on 3/13/2025) for wound check .        Sulma Mayberry MD   [facility]

## 2025-03-13 ENCOUNTER — OFFICE VISIT (OUTPATIENT)
Dept: DERMATOLOGY | Facility: IMAGING CENTER | Age: 55
End: 2025-03-13
Payer: COMMERCIAL

## 2025-03-13 DIAGNOSIS — L98.492 SKIN ULCER WITH FAT LAYER EXPOSED (HCC): ICD-10-CM

## 2025-03-13 PROCEDURE — 99213 OFFICE O/P EST LOW 20 MIN: CPT | Performed by: STUDENT IN AN ORGANIZED HEALTH CARE EDUCATION/TRAINING PROGRAM

## 2025-03-13 NOTE — LETTER
March 13, 2025         Patient: Ines Soliz   YOB: 1970   Date of Visit: 3/13/2025           To Whom it May Concern:    Ines Soliz was seen in my clinic on 3/13/2025. She may return to work in 1 week - 3/20/25. We are continuing treatment for her foot ulcer which is improving but continues to require non -weight bearing and rest for an additional week.     If you have any questions or concerns, please don't hesitate to call.        Sincerely,           Sulma Mayberry M.D.  Electronically Signed

## 2025-03-13 NOTE — PROGRESS NOTES
"[facility] Clinic Note    Chief Complaint   Patient presents with    Wound Check       HPI:      Ines Soliz is a 54 y.o. female here for follow up of morphea, foot ulcers.     She was seen 2/24/25 for rapidly developing ulcer on right foot  - concern for SSTI vs inflammatory condition (pyoderma gangrenosum, vasculitis/vasculopathy). She was started on course of keflex 500 mg three times daily and topical timolol drops. Since then, she notes that the inflammation on her foot has continued to improve, less pain. Today the edema is significantly improved. She completed prednisone taper without side effects. Lesion on left foot improved. No side effects with prednisone. She continues to apply daily timolol drops and intermittent vinegar soaks. Is difficult to walk with closed toed shoes due to pressure on ulcer.     Denies fevers, muscles aches or pain, stomach pain, other atypical or new symptoms. She is otherwise feeling well other than above.       Derm History:   - Started many years ago on medial ankles, top of right foot. Slowly progressing over time.  Sometimes itchy. Previous treatments include topical triamcinolone ointment, didn't help much. Oral meds include amlodipine only.   - Established here 1/07, punch biopsy x2 of left shin and right dorsal foot showed \"consistent with morphea\" with sclerotic dermis and thinned epidermis.   - 1/14/25: ILK 10 mg/cc to plaques on bilateral shins, right dorsal foot. Declined oral therapy (mtx) at this time.      Latest Reference Range & Units 02/25/25 11:35   INR 0.87 - 1.13  1.03   PT 12.0 - 14.6 sec 13.5   APTT 24.7 - 36.0 sec 34.1   Heparin Xa (LMWH) U/mL <0.1   Dilute Jose Luis Viper Venom Ag 28.0 - 48.0 sec 46.5   Lupus Anticoagulant  Not Present   Protein C Functional 83 - 168 % 186 (H)   Protein S-Functional 57 - 131 % 107   Antithrombin III, Functional 76 - 128 % 132 (H)   Anti-Cardiolipin Ab Iga <=11 APL <10   Anti-Cardiolipin Ab Igm <=12 MPL <10 " "  Anti-Cariolipin Ab Igg <=14 GPL <10   Beta 2 Microglobulin 0.8 - 2.4 mg/L 1.7   C3 Complement 90 - 180 mg/dL 200 (H)   Complement C4 10 - 40 mg/dL 43 (H)   (H): Data is abnormally high      ROS: no fevers/chills. Other pertinent positives and negatives as above.     Meds/PMH/PSH/FamHx/Allergies: reviewed relevant to my specialty in the chart.          PHYSICAL EXAM, ASSESSMENT, & PLAN (per problem):   A focused skin exam was performed including the affected areas of the bilateral ankles and feet. Notable findings on exam today listed below and/or in assessment/plan.      Ulceration on right dorsal foot - with fat layer exposed, surrounding erythema   Concern for inflammatory etiology (pyoderma gangrenosum, vs livedoid vasculopathy vs other) - may have been superinfected SSTI given improvement with keflex   Exam:  Bound down hyperpigmented plaques on bilateral medial ankles, dorsal foot with 1cm sharp ulceratio with some stellate borders,some violaceous rolled borders and white discoloration. Yellow fibrinous base. Surrounded erythema and edema improved compared to prior photos     - previous biopsies showed \"morphea\" with sclerotic dermis, compressed adnexal structures. Punch biopsies were shallow - concern that there may be underlying vascular pathology that was not elucidated on biopsy (vasculitis, vasculopathy)   - s/p course of prednisone 60 mg x 4 days, 40 mg x 4 days, 20 mg x 4 days - may bridge to other medication pending response    - baseline blood pressure, blood glucose wnl  - common side effects of prednisone including, but not limited to, mild GI disturbance, alteration in mood/aggitation/difficulty sleeping, elevated blood pressure, increased appetite and weight gain, increased blood glucose level discussed. Additionally discussed more long-term risks including, but not limited to, osteoporosis/osteonecrosis risk, as well as cataracts/glaucoma risk, steroid withdrawl, cushingoid changes, PUD, " reactivation of infections, which are unlikely with this short treatment course.    - s/p course of keflex 500 mg three times daily for additional 7 days (14 days total)   - continue with topical timolol drops to center of ulceration to aid with wound healing   - hypercoagulable work up unremarkable (cryos, anti phospholipid syndrome Abs, protein C/S/ antithrombin, PT/PTT). A few markers were slightly elevated but not significantly.   - would culture from 2/24/25 showing normal skin arelis only   - hold repeat biopsies for H&E/culture in reserve if not improving   - ER precautions reviewed     - wound care consult placed today for assistance with wound care for ulceration -- would excessive debridement when possible given concern for possible pyoderma gangrenosom      Morphea, bilateral shins and right foot   - see above for acute mgmt of ulceration of foot. Unclear if has secondary pathology in second foot resulting in ulceration, would be uncommon for morphea to ulcerate to this degree and be this inflammatory. Consider panniculitis? PAN?   - repeat biopsies in reserve     Follow up: No follow-ups on file.        Sulma Mayberry MD   [facility]

## 2025-03-17 ENCOUNTER — OFFICE VISIT (OUTPATIENT)
Dept: WOUND CARE | Facility: MEDICAL CENTER | Age: 55
End: 2025-03-17
Attending: NURSE PRACTITIONER
Payer: COMMERCIAL

## 2025-03-17 DIAGNOSIS — T14.8XXA PAIN ASSOCIATED WITH WOUND: ICD-10-CM

## 2025-03-17 DIAGNOSIS — L97.513 ULCER OF RIGHT FOOT WITH NECROSIS OF MUSCLE (HCC): ICD-10-CM

## 2025-03-17 DIAGNOSIS — R52 PAIN ASSOCIATED WITH WOUND: ICD-10-CM

## 2025-03-17 PROCEDURE — 99215 OFFICE O/P EST HI 40 MIN: CPT

## 2025-03-17 PROCEDURE — 11042 DBRDMT SUBQ TIS 1ST 20SQCM/<: CPT

## 2025-03-17 PROCEDURE — 99215 OFFICE O/P EST HI 40 MIN: CPT | Mod: 25 | Performed by: NURSE PRACTITIONER

## 2025-03-17 PROCEDURE — 11043 DBRDMT MUSC&/FSCA 1ST 20/<: CPT | Performed by: NURSE PRACTITIONER

## 2025-03-17 PROCEDURE — 99214 OFFICE O/P EST MOD 30 MIN: CPT

## 2025-03-17 NOTE — PATIENT INSTRUCTIONS
Avoid prolonged standing or sitting without elevating your legs.  - Apply tubigrip to your legs ending 2 fingers below back of knee without wrinkles.   - Knee-high gradient compression to legs  -Remove if temperature or sensation changes.     Should you experience any significant changes in your wound(s), such as infection (redness, swelling, localized heat, increased pain, fever > 101 F, chills) or have any questions regarding your home care instructions, please contact the wound center at [phone]. If after hours, contact your primary care physician or go to the hospital emergency room.   Keep dressing clean, dry and covered while bathing. Only change dressing if it becomes over saturated, soiled or falls off.

## 2025-03-17 NOTE — PROGRESS NOTES
Provider Encounter- Full Thickness wound    HISTORY OF PRESENT ILLNESS  Wound History:    START OF CARE IN CLINIC: 3/17/25    REFERRING PROVIDER: Sulma Mayberry MD     WOUND- Full Thickness Wound   LOCATION: Right dorsal third MTH     HISTORY: Patient initially noted a white dot to her right dorsal foot around October 2023.  It was not until she scratched the area and ulcer was noted around December 2024.  She denies any lesion formation.  Since then it has progressively worsened.  She followed up with her PCP who started her on triamcinolone.  She noticed some improvement.  She was referred to dermatology for further treatment and care.  She underwent punch biopsy on 1/7/25 of left medial calf and right dorsal foot showed consistent with morphea with sclerotic dermis and thinned epidermis.  negative for malignancy.  She continued with the Vaseline dressing.  Around 2/24/2025 she noticed worsening pain, swelling, purulence and erythema.  She was started on Keflex for 1 week followed by prednisone for 12 days which she completed.  She has since started timolol applying it twice a day.  She denies that there was ever a raised lesion but always rather an ulceration.  Current dressing treatment consisting of timolol twice daily followed by Vaseline and a Band-Aid.  She is performing soaks of her feet with water and vinegar.  She has not had significant improvement and was referred to [facility] wound care clinic for further treatment and care.  Ulcer has caused her to have difficulty walking because of the pain she is walking on lateral aspect of her foot.      Pertinent Medical History: Dyslipidemia, hypertension, thyroidectomy, prediabetes    TOBACCO USE:  no , no alcohol use, no drug use   Works at [employer].  Standing on feet 8 hours a day still toed shoes.  Patient's problem list, allergies, and current medications reviewed and updated in Epic    Interval History:  3/17/2025: Initial wound evaluation,  initial provider Clinic visit with Aditi MCKEON, REDD-BC, CWON, CFDALI.  Pt denies fevers, chills, nausea, vomiting.  Accompanied by daughter to appointment.  X-ray third toe ordered rule out osteomyelitis.  Tendon exposed.  Hold timolol at this time.  Initiate Puracyn gel, Hydrofera Blue.  Consider snap VAC if no pathergy noted.        REVIEW OF SYSTEMS:   Review of Systems   Constitutional:  Negative for chills, diaphoresis and fever.   HENT: Negative.     Eyes: Negative.    Respiratory:  Negative for cough, shortness of breath and wheezing.    Cardiovascular:  Positive for leg swelling. Negative for chest pain, palpitations and claudication.   Gastrointestinal:  Negative for nausea and vomiting.   Musculoskeletal:  Negative for back pain, falls and joint pain.   Skin:  Negative for itching and rash.        Right top of foot   Neurological:  Negative for weakness.   Psychiatric/Behavioral:  Negative for depression. The patient is not nervous/anxious.        PHYSICAL EXAMINATION:   [facility] 06/19/2017     Physical Exam  Cardiovascular:      Pulses: Normal pulses.      Comments: +2 DP, +2 PT to right foot easily palpable  Pulmonary:      Effort: Pulmonary effort is normal.   Musculoskeletal:         General: Swelling and tenderness present.      Right lower leg: Edema present.      Left lower leg: Edema present.      Comments: Hemosiderin staining to right dorsal forefoot, faint hemosiderin staining to bilateral medial calf   Skin:     Comments: Right dorsal third MTH: Full-thickness, soft slough, tendon exposed, undermining from 10-2 o'clock, no evidence of infection, dark stained purple discoloration periwound   Neurological:      General: No focal deficit present.      Mental Status: She is alert.         WOUND ASSESSMENT  Wound 03/17/25 Full Thickness Wound Foot Dorsal Right (Active)   Wound Image   03/17/25 0800   Site Assessment Yellow;Slough 03/17/25 0800   Periwound Assessment Hyperpigmented 03/17/25 0800    Margins Defined edges 03/17/25 0800   Closure Secondary intention 03/17/25 0800   Drainage Amount Scant 03/17/25 0800   Drainage Description Serosanguineous 03/17/25 0800   Treatments Cleansed;Topical Lidocaine;Provider debridement 03/17/25 0800   Wound Cleansing Hypochlorus Acid 03/17/25 0800   Periwound Protectant No-sting Skin Prep 03/17/25 0800   Dressing Status Old drainage 03/17/25 0800   Dressing Changed Changed 03/17/25 0800   Dressing Cleansing/Solutions Not Applicable 03/17/25 0800   Dressing Options Puracyn Gel;Mepitel One;Hydrofera Blue Ready;Hypafix Tape;Tubigrip 03/17/25 0800   Dressing Change/Treatment Frequency Every 72 hrs, and As Needed 03/17/25 0800   Wound Team Following Weekly 03/17/25 0800   Non-staged Wound Description Full thickness 03/17/25 0800   Wound Length (cm) 0.9 cm 03/17/25 0800   Wound Width (cm) 0.6 cm 03/17/25 0800   Wound Surface Area (cm^2) 0.54 cm^2 03/17/25 0800   Post-Procedure Length (cm) 1 cm 03/17/25 0800   Post-Procedure Width (cm) 0.8 cm 03/17/25 0800   Post-Procedure Depth (cm) 0.3 cm 03/17/25 0800   Post-Procedure Surface Area (cm^2) 0.8 cm^2 03/17/25 0800   Post-Procedure Volume (cm^3) 0.24 cm^3 03/17/25 0800   Wound Bed Slough (%) 100 % 03/17/25 0800   Tunneling (cm) 0 cm 03/17/25 0800   Undermining (cm) 0.1 cm 03/17/25 0800   Undermining of Wound, 1st Location From 10 o'clock;To 2 o'clock 03/17/25 0800   Wound Odor None 03/17/25 0800   Pulses Right;2+;PT;DP 03/17/25 0800   Exposed Structures HANK;Other (Comments) 03/17/25 0800   Number of days: 1       PROCEDURE: Right dorsal third MTH  -2% viscous lidocaine applied topically to wound bed for approximately 10 minutes prior to debridement  -Curette used to debride wound bed.  Excisional debridement was performed to remove devitalized tissue until healthy, bleeding tissue was visualized.   Entire surface of wound, 0.8 cm2 debrided.  Tissue debrided into the tendon muscle layer.    -Bleeding controlled with manual  pressure.    -Wound care completed by wound RN, refer to flowsheet  -Patient tolerated the procedure well, without c/o pain or discomfort.       Pertinent Labs and Diagnostics:    Labs:     A1c:   Lab Results   Component Value Date/Time    HBA1C 5.8 (H) 12/23/2024 09:57 AM          IMAGING: X-ray right third toe ordered 3/17/2025    VASCULAR STUDIES: None    LAST  WOUND CULTURE:  DATE :   Lab Results   Component Value Date/Time    CULTRSULT Light growth usual skin arelis. (A) 02/24/2025 03:50 PM    CULTRSULT Staphylococcus lugdunensis  Light growth   (A) 02/24/2025 03:50 PM    CULTRSULT Corynebacterium amycolatum  Heavy growth   (A) 02/24/2025 03:50 PM    CULTRSULT (A) 02/24/2025 03:50 PM     Anaerobic Gram positive cocci  Moderate growth  Peptoniphilus species  Organism identified by MALDI-TOF research use only library.           ASSESSMENT AND PLAN:     1. Ulcer of right foot with necrosis of muscle (HCC)  Developed ulceration around December 2024    3/17/2025: Initial assessment.  Followed up with dermatology who took biopsy.  Negative for malignancy.  - Thick slough.  Once debrided revealed tendon exposure beginning to slightly desiccate  - Excisional debridement performed today.  Medically necessary to promote wound healing.  -Patient to follow-up weekly at wound care clinic for assessment and possible debridement  - Patient to change dressing 1-2 times in between clinic appointments  - Hold timolol at this time.    -Initiate Puracyn gel to keep tendon moist, Hydrofera Blue.  Consider snap VAC to increase granulation over tendon if no pathergy noted.  - X-ray toe ordered  -Initiate Tubigrip for mild compression    Wound care:Puracyn Gel;Mepitel One;Hydrofera Blue Ready;Hypafix Tape;Tubigrip    2.  Pain associated with wound  3/17/2025: Wound severely tender with palpation  - Did tolerate excisional debridement with 2% viscous lidocaine  - Denies any autoimmune disorders.  Possible concern for pyoderma  gangrenosum.  Continue to monitor for worsening signs and symptoms/pathergy        PATIENT EDUCATION  - Importance of adequate nutrition for wound healing  -Advised to go to ER for any increased redness, swelling, drainage, or odor, or if patient develops fever, chills, nausea or vomiting.     My total time spent caring for the patient on the day of the encounter was 40 minutes.   This does not include time spent on separately billable procedures/tests.       Please note that this note may have been created using voice recognition software. I have worked with technical experts from [facility] to optimize the interface.  I have made every reasonable attempt to correct obvious errors, but there may be errors of grammar and possibly content that I did not discover before finalizing the note.    N

## 2025-03-18 ASSESSMENT — ENCOUNTER SYMPTOMS
WHEEZING: 0
CLAUDICATION: 0
NERVOUS/ANXIOUS: 0
EYES NEGATIVE: 1
FEVER: 0
DEPRESSION: 0
VOMITING: 0
ROS SKIN COMMENTS: RIGHT TOP OF FOOT
PALPITATIONS: 0
DIAPHORESIS: 0
NAUSEA: 0
SHORTNESS OF BREATH: 0
BACK PAIN: 0
COUGH: 0
CHILLS: 0
FALLS: 0
WEAKNESS: 0

## 2025-03-19 ENCOUNTER — HOSPITAL ENCOUNTER (OUTPATIENT)
Dept: RADIOLOGY | Facility: MEDICAL CENTER | Age: 55
End: 2025-03-19
Attending: NURSE PRACTITIONER
Payer: COMMERCIAL

## 2025-03-19 DIAGNOSIS — L97.513 ULCER OF RIGHT FOOT WITH NECROSIS OF MUSCLE (HCC): ICD-10-CM

## 2025-03-19 PROCEDURE — 73660 X-RAY EXAM OF TOE(S): CPT | Mod: RT

## 2025-03-19 RX ORDER — TIMOLOL MALEATE 5 MG/ML
SOLUTION/ DROPS OPHTHALMIC
Qty: 90 ML | Refills: 1 | Status: SHIPPED | OUTPATIENT
Start: 2025-03-19

## 2025-03-20 ENCOUNTER — OFFICE VISIT (OUTPATIENT)
Dept: DERMATOLOGY | Facility: IMAGING CENTER | Age: 55
End: 2025-03-20
Payer: COMMERCIAL

## 2025-03-20 DIAGNOSIS — L98.492 SKIN ULCER WITH FAT LAYER EXPOSED (HCC): ICD-10-CM

## 2025-03-20 DIAGNOSIS — L30.9 DERMATITIS: ICD-10-CM

## 2025-03-20 PROCEDURE — 11104 PUNCH BX SKIN SINGLE LESION: CPT | Performed by: STUDENT IN AN ORGANIZED HEALTH CARE EDUCATION/TRAINING PROGRAM

## 2025-03-20 PROCEDURE — 99213 OFFICE O/P EST LOW 20 MIN: CPT | Mod: 25 | Performed by: STUDENT IN AN ORGANIZED HEALTH CARE EDUCATION/TRAINING PROGRAM

## 2025-03-20 NOTE — PROGRESS NOTES
"[facility] Note    Chief Complaint   Patient presents with    Follow-Up     Pt was seen by Wound Care on 03/17/2025. Pt states its still swollen. Did imagine to rule out any damage to the bone. Going back to wound care on Monday 24th.       HPI:      Ines Soliz is a 54 y.o. female here for follow up of acute foot ulceration, dermatitis on shins and feet.     At last visit, was referred to wound care clinic, swelling and pain on foot seemed to be improved. Today she notes continued improvement, still with ulceration and swelling but overall pain is much improved. Has dressing and supplies from wound care clinic. Still has persistent firm nodule on left medial shin, although less painful today.     Denies fevers, muscles aches or pain, stomach pain, other atypical or new symptoms. She is otherwise feeling well other than above.       Derm History:   - Established here 1/07, Reported history of skin changes which started years ago on medial ankles, top of right foot with hyperpigmentation, bound down plaques.  Slowly progressing over time.  Sometimes itchy. Previous treatments include topical triamcinolone ointment, didn't help much. Oral meds at the time include amlodipine only. Otherwise healthy.     -  Punch biopsy x2 of left shin and right dorsal foot showed \"consistent with morphea\" with sclerotic dermis and thinned epidermis. (Biopsies were shallow)    1/14/25: ILK 10 mg/cc to plaques on bilateral shins, right dorsal foot. Declined oral therapy (mtx) at this time. Overall areas relatively inactive.     2/24/25: developed acute worsening ulceration with full thickness to tendon and surrounding violaceous undermined borders and significant edema and erythema of surrounding skin on dorsal right foot. Concern for SSTI as well as inflammatory cause like pyoderma gangrenosum, vs vasculitis or other vasculopathy.  She was treated with 14 day course of keflex with improvement in edema and " "erythema, then started on prednisone taper 12d prednisone taper 1 mg/kg started 3/03 with continued improvement of edema and pseudo- undermined borders           ROS: no fevers/chills. Other pertinent positives and negatives as above.     Meds/PMH/PSH/FamHx/Allergies: reviewed relevant to my specialty in the chart.        OBJECTIVE    Exam:    A focused skin exam was performed including the affected areas of the bilateral ankles and feet. Notable findings on exam today listed below and/or in assessment/plan.    - Bound down hyperpigmented plaques on bilateral medial ankles, left ankle with firm subQ nodule without erythema, minimal tenderness   - Dorsal foot with 1cm sharp full thickness ulceration with tendon exposed, dusky surrounding borders. Erythema and edema much improved today compared to prior visits   - photos uploaded today     Labs:      Latest Reference Range & Units 02/25/25 11:35   INR 0.87 - 1.13  1.03   PT 12.0 - 14.6 sec 13.5   APTT 24.7 - 36.0 sec 34.1   Heparin Xa (LMWH) U/mL <0.1   Dilute Jose Luis Viper Venom Ag 28.0 - 48.0 sec 46.5   Lupus Anticoagulant  Not Present   Protein C Functional 83 - 168 % 186 (H)   Protein S-Functional 57 - 131 % 107   Antithrombin III, Functional 76 - 128 % 132 (H)   Anti-Cardiolipin Ab Iga <=11 APL <10   Anti-Cardiolipin Ab Igm <=12 MPL <10   Anti-Cariolipin Ab Igg <=14 GPL <10   Beta 2 Microglobulin 0.8 - 2.4 mg/L 1.7   C3 Complement 90 - 180 mg/dL 200 (H)   Complement C4 10 - 40 mg/dL 43 (H)   (H): Data is abnormally high      Foot xray 3/19/25: No cortical bone destruction or periosteal reaction.     ASSESSMENT, & PLAN      #Sclerotic dermatitis on medial shins with associated # Subcutaneous tender nodules  # Full thickness ulceration on right dorsal foot    Ddx: Previous punch biopsies of left medial shin and right dorsal foot showed \"consistent with morphea\" with sclerotic dermis, compressed adnexal structures. Punch biopsies were shallow and clinically not " classic for morphea with subQ nodules and ulceration, which would be atypical. Ddx would include panniculitis, medium vessel vasculitis, atypical infections (although multifocal, possible lower concern for this).  Hypercoagulable work up previously unremarkable (cryos, anti phospholipid syndrome Abs, protein C/S/ antithrombin, PT/PTT). A few markers were slightly elevated but not significantly. Superficial would culture swab from ulcer 2/24/25 showing normal skin arelis only       - continue topical timolol drops to center of ulceration to aid with wound healing   - appreciate wound care assistance, continue follow up    - given ddx uncertainty, repeat deeper punch biopsy today of nodule on left shin   - further treatments pending biopsy results   - hold biopsy for culture in reserve   - work note provided         Punch Biopsy Procedure Note:  Procedure: Biopsy by punch technique  Number of biopsies performed:   Size: 4mm   Location: left medial shin   Differential diagnosis: as discussed above   Risks, benefits and alternatives of procedure discussed, verbal consent obtained for photo (see chart) and written informed consent obtained for procedure. Time out completed. Area of biopsy prepped with alcohol. Anesthesia with 1% lidocaine with epinephrine administered intradermally with a 30 gauge needle. Punch biopsy of site performed. Hemostasis achieved with pressure and 4.0 prolene sutures. Vaseline applied to wound with bandage. Patient tolerated procedure well, and there were no complications.  The pathology specimen was sent to the lab via the staff.  Wound care was discussed with the patient. Please call clinic if post procedure complications such as bleeding at home, concern for infection.           Follow up: Return for next thursday ( 7 day) for suture removal as well as MD visit .        Sulma Mayberry MD   [facility]

## 2025-03-20 NOTE — LETTER
March 20, 2025    To Whom It May Concern:         This is confirmation that Ines Soliz attended her scheduled appointment with Sulma Mayberry M.D. on 3/20/25.    Please excuse from work for one additional week until 3/27 as we monitor closely for foot ulcer and continued healing. Continues to improve.          If you have any questions please do not hesitate to call me at the phone number listed below.    Sincerely,          Sulma Mayberry M.D.  [phone]

## 2025-03-24 ENCOUNTER — NON-PROVIDER VISIT (OUTPATIENT)
Dept: WOUND CARE | Facility: MEDICAL CENTER | Age: 55
End: 2025-03-24
Attending: NURSE PRACTITIONER
Payer: COMMERCIAL

## 2025-03-24 PROCEDURE — 97602 WOUND(S) CARE NON-SELECTIVE: CPT

## 2025-03-24 NOTE — PATIENT INSTRUCTIONS
- Keep dressings clean and dry. Change dressings every 3 days, and if they become over saturated, soiled or fall off.     - If you need to change your dressings at home: Wash your wound with normal saline, wound cleanser, or unscented soap and water prior to applying your new dressings. Please avoid cleansing with hydrogen peroxide or rubbing alcohol. Hydrogen peroxide and rubbing alcohol are toxic to new tissue and skin cells.    - Should you experience any significant changes in your wound(s), such as infection (redness, swelling, localized heat, increased pain, fever > 101 F, chills) or have any questions regarding your home care instructions, please contact the wound center at [phone]. If after hours, contact your primary care physician or go to the hospital emergency room.     - If you are admitted to any hospital, you will need a new referral to come back to the wound clinic and any scheduled appointments that you already have, may be cancelled.    - If you are 5 or more minutes late for an appointment, we reserve the right to cancel and reschedule that appointment. Additionally, if you are habitually late or not showing (3 late cancellations and/or no shows), we reserve the right to cancel your remaining appointments and it will be your responsibility to obtain a new referral if services are still needed.

## 2025-03-24 NOTE — PROGRESS NOTES
2% viscous lidocaine for ~5 minute dwell time. Non-selective debridement to wound and micheal-wound using hypochlorous acid and gauze to remove biofilm and non-viable tissue. Patient tolerated well.

## 2025-03-26 ENCOUNTER — RESULTS FOLLOW-UP (OUTPATIENT)
Dept: DERMATOLOGY | Facility: IMAGING CENTER | Age: 55
End: 2025-03-26

## 2025-03-27 ENCOUNTER — OFFICE VISIT (OUTPATIENT)
Dept: DERMATOLOGY | Facility: IMAGING CENTER | Age: 55
End: 2025-03-27
Payer: COMMERCIAL

## 2025-03-27 DIAGNOSIS — M79.3 LIPODERMATOSCLEROSIS: ICD-10-CM

## 2025-03-27 DIAGNOSIS — L98.492 SKIN ULCER WITH FAT LAYER EXPOSED (HCC): ICD-10-CM

## 2025-03-27 PROCEDURE — 99214 OFFICE O/P EST MOD 30 MIN: CPT | Performed by: STUDENT IN AN ORGANIZED HEALTH CARE EDUCATION/TRAINING PROGRAM

## 2025-03-27 RX ORDER — ASPIRIN 81 MG/1
81 TABLET, CHEWABLE ORAL DAILY
Qty: 100 TABLET | Refills: 0 | Status: SHIPPED | OUTPATIENT
Start: 2025-03-27 | End: 2025-03-28 | Stop reason: SDUPTHER

## 2025-03-27 RX ORDER — HYDROXYCHLOROQUINE SULFATE 200 MG/1
200 TABLET, FILM COATED ORAL DAILY
Qty: 30 TABLET | Refills: 4 | Status: SHIPPED | OUTPATIENT
Start: 2025-03-27

## 2025-03-27 RX ORDER — PENTOXIFYLLINE 400 MG/1
400 TABLET, EXTENDED RELEASE ORAL
Qty: 90 TABLET | Refills: 3 | Status: SHIPPED | OUTPATIENT
Start: 2025-03-27

## 2025-03-27 NOTE — PROGRESS NOTES
"[facility] Clinic Note    Chief Complaint   Patient presents with    Follow-Up     Patient reports there has been no changes, and she is still swollen        HPI:      Ines Soliz is a 54 y.o. female here for follow up of leg rash, foot ulceration.     She continues on weekly wound care visits. Here to discuss repeat biopsy results from left shin. Getting some red dots and itching around biopsy site. Otherwise feeling well. Swelling seems to be about stable in foot, no other issues today. She is otherwise feeling well other than above.       Derm History:   - Established here 1/07, Reported history of skin changes which started years ago on medial ankles, top of right foot with hyperpigmentation, bound down plaques.  Slowly progressing over time.  Sometimes itchy. Previous treatments include topical triamcinolone ointment, didn't help much. Oral meds at the time include amlodipine only. Otherwise healthy.     -  Punch biopsy x2 of left shin and right dorsal foot showed \"consistent with morphea\" with sclerotic dermis and thinned epidermis. (Biopsies were shallow)    1/14/25: ILK 10 mg/cc to plaques on bilateral shins, right dorsal foot. Overall areas relatively inactive.     2/24/25: developed acute worsening ulceration with full thickness to tendon and surrounding violaceous undermined borders and significant edema and erythema of surrounding skin on dorsal right foot. Concern for SSTI as well as inflammatory cause like pyoderma gangrenosum, vs vasculitis or other vasculopathy.  She was treated with 14 day course of keflex with improvement in edema and erythema, then started on prednisone taper 12d prednisone taper 1 mg/kg started 3/03 with continued improvement.           ROS: no fevers/chills. Other pertinent positives and negatives as above.     Meds/PMH/PSH/FamHx/Allergies: reviewed relevant to my specialty in the chart.        OBJECTIVE    Exam:    A focused skin exam was performed including " the affected areas of the bilateral ankles and feet. Notable findings on exam today listed below and/or in assessment/plan.    - Bound down hyperpigmented plaques on bilateral medial ankles, left ankle with firm subQ nodule without erythema, minimal tenderness   - Dorsal foot with 1cm sharp full thickness ulceration with tendon exposed. Erythema and edema continue to improve.   Labs:      Latest Reference Range & Units 02/25/25 11:35   INR 0.87 - 1.13  1.03   PT 12.0 - 14.6 sec 13.5   APTT 24.7 - 36.0 sec 34.1   Heparin Xa (LMWH) U/mL <0.1   Dilute Jose Luis Viper Venom Ag 28.0 - 48.0 sec 46.5   Lupus Anticoagulant  Not Present   Protein C Functional 83 - 168 % 186 (H)   Protein S-Functional 57 - 131 % 107   Antithrombin III, Functional 76 - 128 % 132 (H)   Anti-Cardiolipin Ab Iga <=11 APL <10   Anti-Cardiolipin Ab Igm <=12 MPL <10   Anti-Cariolipin Ab Igg <=14 GPL <10   Beta 2 Microglobulin 0.8 - 2.4 mg/L 1.7   C3 Complement 90 - 180 mg/dL 200 (H)   Complement C4 10 - 40 mg/dL 43 (H)   (H): Data is abnormally high      Foot xray 3/19/25: No cortical bone destruction or periosteal reaction.     ASSESSMENT, & PLAN      Lipodermatosclerosis   Full thickness ulceration on right dorsal foot - wonder about underlying livedoid vasculopathy     Discussed that repeat punch biopsy on left shin more consistent with lipodermatosclerosis, a vascular disorder involving deep fat. This clinically fits better than morphea. Regarding involvement of right dorsal foot, ddx may also include underlying livedoid vasculopathy. Hypercoagulable work up previously unremarkable (cryos, anti phospholipid syndrome Abs, protein C/S/ antithrombin, PT/PTT). A few markers were slightly elevated but not significantly. Superficial would culture swab from ulcer 2/24/25 showing normal skin arelis only     - continue topical timolol drops to center of ulceration to aid with wound healing   - appreciate wound care assistance, continue follow up    - start  trental 400 mg three times daily with meals. Discussed side effects. No history of bleeding disorders in herself or family.   - start hydroxychloroquine 200 mg daily (<5mg/kg/daily). Discussed side effects. She has ophthalmologist (wears glasses), recommend at next visit to inform she is on this medication. She stated understanding.   - recommend daily baby aspirin 81 mg   - work note provided   - compression, elevation of leg as possible       Follow up: Return in about 2 weeks (around 4/10/2025).        Sulma Mayberry MD   [facility]

## 2025-03-27 NOTE — LETTER
March 27, 2025    To Whom It May Concern:         This is confirmation that Ines Soliz attended her scheduled appointment with Sulma Mayberry M.D. on 3/27/25 in dermatology clinic. She is also being seen weekly in wound care clinic     Please extend return to work for an additional month, until April 25th, 2025 for foot ulcer. Continues to heal and improve, however would like to postpone working and standing to allow for full healing and prevent worsening of skin disease. Anticipate good recovery.          If you have any questions please do not hesitate to call me at the phone number listed below.    Sincerely,          Sulma Mayberry M.D.  [phone]

## 2025-03-28 RX ORDER — LORATADINE 10 MG
81 TABLET ORAL
Qty: 90 TABLET | Refills: 2 | Status: SHIPPED | OUTPATIENT
Start: 2025-03-28

## 2025-04-01 ENCOUNTER — DOCUMENTATION (OUTPATIENT)
Dept: WOUND CARE | Facility: MEDICAL CENTER | Age: 55
End: 2025-04-01
Payer: COMMERCIAL

## 2025-04-01 ENCOUNTER — NON-PROVIDER VISIT (OUTPATIENT)
Dept: WOUND CARE | Facility: MEDICAL CENTER | Age: 55
End: 2025-04-01
Attending: NURSE PRACTITIONER
Payer: COMMERCIAL

## 2025-04-01 PROCEDURE — 97602 WOUND(S) CARE NON-SELECTIVE: CPT

## 2025-04-01 NOTE — PROGRESS NOTES
Non-selective debridement to right dorsal foot wound with cotton tip applicator to remove non-viable slough from wound bed. Discussed case again with LINDA Davila regarding SNAP VAC & request placed for SNAP authorization. Changed dressing to enluxtra due to maceration of tendon & starting to fray, to better manage optimal moisture levels for structure. Discussed plan with rationale to patient.

## 2025-04-01 NOTE — PATIENT INSTRUCTIONS
Avoid prolonged standing or sitting without elevating your legs.  - Apply tubigrip to your legs ending 2 fingers below back of knee without wrinkles.   - Remove if temperature or sensation changes.     Should you experience any significant changes in your wound(s), such as infection (redness, swelling, localized heat, increased pain, fever > 101 F, chills) or have any questions regarding your home care instructions, please contact the wound center at [phone]. If after hours, contact your primary care physician or go to the hospital emergency room.   Keep dressing clean, dry and covered while bathing. Only change dressing if it becomes over saturated, soiled or falls off or every 3 days.

## 2025-04-01 NOTE — PROGRESS NOTES
Prior authorization request submitted for 10 applications of SNAP vac to be applied to right dorsal foot wound.

## 2025-04-02 NOTE — Clinical Note
REFERRAL APPROVAL NOTICE         Sent on April 2, 2025                   Ines Acuna Martínez  [address] [place] [address]                   Dear Ms. Soliz,    After a careful review of the medical information and benefit coverage, [facility] has processed your referral. See below for additional details.    If applicable, you must be actively enrolled with your insurance for coverage of the authorized service. If you have any questions regarding your coverage, please contact your insurance directly.    REFERRAL INFORMATION   Referral #:  [id number]  Referred-To Department    Referred-By Provider:  Wound Care    KEVIN Bell   [facility]      [address]  [address]  [place] [address]  [phone] [address]  [place] [address] [phone]    Referral Start Date:  04/01/2025  Referral End Date:   04/01/2026             SCHEDULING  If you do not already have an appointment, please call [phone] to make an appointment.     MORE INFORMATION  If you do not already have a [employer] account, sign up at: [email or web address]  You can access your medical information, make appointments, see lab results, billing information, and more.  If you have questions regarding this referral, please contact  the [facility] Referrals department at:             [phone]. Monday - Friday 8:00AM - 5:00PM.     Sincerely,    [facility]

## 2025-04-08 ENCOUNTER — NON-PROVIDER VISIT (OUTPATIENT)
Dept: WOUND CARE | Facility: MEDICAL CENTER | Age: 55
End: 2025-04-08
Attending: NURSE PRACTITIONER
Payer: COMMERCIAL

## 2025-04-08 PROCEDURE — 97602 WOUND(S) CARE NON-SELECTIVE: CPT

## 2025-04-08 PROCEDURE — 97607 NEG PRS WND THR NDME<=50SQCM: CPT

## 2025-04-08 NOTE — PROGRESS NOTES
Non-selective debridement using normal saline irrigation and cotton tip applicator to remove old drainage and biofilm over exposed tendon.    Snap Vac was applied with written and verbal education provided.  Pt aware of reason and need for the vac.      Paste ring used and vac applied with no leaks detected.

## 2025-04-08 NOTE — PATIENT INSTRUCTIONS
-Keep your wound dressing clean, dry, and intact. Only change dressing if it's over saturated, soiled or falls off.     -Change your dressing if it becomes soiled, soaked, or falls off.    -Wound vac may not have any drainage in tube or cannister & it will still be working.   Change cannister if it does become full by pressing tab on side of machine to remove canister and snap on new one. Full canister can be thrown in the trash. If cannister fills with bright red blood - go to ER. Dressing will be changed every MWF at the wound clini.  If you are having issues with your wound VAC, please consider patching leaks, changing the canister, or calling [phone] for troubleshooting. If the wound VAC has been off or un-operational for over 2 hours, call wound care center to inform them and remove all dressings including black foam and replace with normal saline damp gauze.     -Should you experience any significant changes in your wound(s), such as infection (redness, swelling, localized heat, increased pain, fever > 101 F, chills) or have any questions regarding your home care instructions, please contact the wound center at [phone]. If after hours, contact your primary care physician or go to the hospital emergency room.     If you are admitted to any hospital, you will need a new referral to come back to the wound clinic and any scheduled appointments that you already have, may be cancelled.

## 2025-04-09 ENCOUNTER — OFFICE VISIT (OUTPATIENT)
Dept: DERMATOLOGY | Facility: IMAGING CENTER | Age: 55
End: 2025-04-09
Payer: COMMERCIAL

## 2025-04-09 DIAGNOSIS — L98.492 SKIN ULCER WITH FAT LAYER EXPOSED (HCC): ICD-10-CM

## 2025-04-09 DIAGNOSIS — M79.3 LIPODERMATOSCLEROSIS: ICD-10-CM

## 2025-04-09 PROCEDURE — 99213 OFFICE O/P EST LOW 20 MIN: CPT | Performed by: STUDENT IN AN ORGANIZED HEALTH CARE EDUCATION/TRAINING PROGRAM

## 2025-04-09 NOTE — PROGRESS NOTES
"[facility]ermatology Clinic Note    Chief Complaint   Patient presents with    Follow-Up       HPI:      Ines Soliz is a 54 y.o. female here for 2 week follow up of lipodermatosclerosis, foot ulceration.     At last visit, started on oral trental and hydroxychloroquine for lipodermatosclerosis. Tolerating meds well, notes some increased fatigue and sleepiness.  Patient reports she continues on weekly wound care visits and yesterday they applied a wound vac to the foot to speed up healing. Patient has been using the trental and hydrochloroquine but is still in pain.     Derm History:   - Established here 1/07, Reported history of skin changes which started years ago on medial ankles, top of right foot with hyperpigmentation, bound down plaques.  Slowly progressing over time.  Sometimes itchy. Previous treatments include topical triamcinolone ointment, didn't help much. Oral meds at the time include amlodipine only. Otherwise healthy.     -  Punch biopsy x2 of left shin and right dorsal foot showed \"consistent with morphea\" with sclerotic dermis and thinned epidermis. (Biopsies were shallow)    1/14/25: ILK 10 mg/cc to plaques on bilateral shins, right dorsal foot. Overall areas relatively inactive.     2/24/25: developed acute worsening ulceration with full thickness to tendon and surrounding violaceous undermined borders and significant edema and erythema of surrounding skin on dorsal right foot. Concern for SSTI as well as inflammatory cause like pyoderma gangrenosum, vs vasculitis or other vasculopathy.  She was treated with 14 day course of keflex with improvement in edema and erythema, then started on prednisone taper 12d prednisone taper 1 mg/kg started 3/03 with continued improvement.           ROS: no fevers/chills. Other pertinent positives and negatives as above.     Meds/PMH/PSH/FamHx/Allergies: reviewed relevant to my specialty in the chart.        OBJECTIVE    Exam:    A focused skin exam was " performed including the affected areas of the bilateral ankles and feet. Notable findings on exam today listed below and/or in assessment/plan.    - Bound down hyperpigmented plaques on bilateral medial ankles, left ankle with firm subQ nodule without erythema, minimal tenderness   - dorsal foot not examined today; wound vac in place  Labs:      Latest Reference Range & Units 02/25/25 11:35   INR 0.87 - 1.13  1.03   PT 12.0 - 14.6 sec 13.5   APTT 24.7 - 36.0 sec 34.1   Heparin Xa (LMWH) U/mL <0.1   Dilute Jose Luis Viper Venom Ag 28.0 - 48.0 sec 46.5   Lupus Anticoagulant  Not Present   Protein C Functional 83 - 168 % 186 (H)   Protein S-Functional 57 - 131 % 107   Antithrombin III, Functional 76 - 128 % 132 (H)   Anti-Cardiolipin Ab Iga <=11 APL <10   Anti-Cardiolipin Ab Igm <=12 MPL <10   Anti-Cariolipin Ab Igg <=14 GPL <10   Beta 2 Microglobulin 0.8 - 2.4 mg/L 1.7   C3 Complement 90 - 180 mg/dL 200 (H)   Complement C4 10 - 40 mg/dL 43 (H)   (H): Data is abnormally high      Foot xray 3/19/25: No cortical bone destruction or periosteal reaction.     ASSESSMENT, & PLAN      Lipodermatosclerosis   Full thickness ulceration on right dorsal foot - wonder about underlying livedoid vasculopathy     Discussed that repeat punch biopsy on left shin more consistent with lipodermatosclerosis, a vascular disorder involving deep fat. This clinically fits better than morphea. Regarding involvement of right dorsal foot, ddx may also include underlying livedoid vasculopathy. Hypercoagulable work up previously unremarkable (cryos, anti phospholipid syndrome Abs, protein C/S/ antithrombin, PT/PTT). A few markers were slightly elevated but not significantly. Superficial would culture swab from ulcer 2/24/25 showing normal skin arelis only     - appreciate wound care assistance, continue follow up, wound vac therapy   - continue trental 400 mg three times daily with meals. Discussed side effects. No history of bleeding disorders in  herself or family.   - continue hydroxychloroquine 200 mg daily (<5mg/kg/daily). Discussed side effects. She has ophthalmologist (wears glasses), recommend at next visit to inform she is on this medication. She stated understanding.   - recommend daily baby aspirin 81 mg   - compression, elevation of leg as possible       Follow up: Return in about 2 weeks (around 4/23/2025) for rash.        Sulma Mayberry MD   [facility]

## 2025-04-09 NOTE — PROGRESS NOTES
"[facility] Note    No chief complaint on file.      HPI:      Ines Soliz is a 54 y.o. female here for follow up of leg rash, foot ulceration.     She continues on weekly wound care visits. At last visit, started on oral pentoxifylline, trental, asa       Derm History:   - Established here 1/07, Reported history of skin changes which started years ago on medial ankles, top of right foot with hyperpigmentation, bound down plaques.  Slowly progressing over time.  Sometimes itchy. Previous treatments include topical triamcinolone ointment, didn't help much. Oral meds at the time include amlodipine only. Otherwise healthy.     -  Punch biopsy x2 of left shin and right dorsal foot showed \"consistent with morphea\" with sclerotic dermis and thinned epidermis. (Biopsies were shallow)    1/14/25: ILK 10 mg/cc to plaques on bilateral shins, right dorsal foot. Overall areas relatively inactive.     2/24/25: developed acute worsening ulceration with full thickness to tendon and surrounding violaceous undermined borders and significant edema and erythema of surrounding skin on dorsal right foot. Concern for SSTI as well as inflammatory cause like pyoderma gangrenosum, vs vasculitis or other vasculopathy.  She was treated with 14 day course of keflex with improvement in edema and erythema, then started on prednisone taper 12d prednisone taper 1 mg/kg started 3/03 with continued improvement.           ROS: no fevers/chills. Other pertinent positives and negatives as above.     Meds/PMH/PSH/FamHx/Allergies: reviewed relevant to my specialty in the chart.        OBJECTIVE    Exam:    A focused skin exam was performed including the affected areas of the bilateral ankles and feet. Notable findings on exam today listed below and/or in assessment/plan.    - Bound down hyperpigmented plaques on bilateral medial ankles, left ankle with firm subQ nodule without erythema, minimal tenderness   - Dorsal foot with 1cm " sharp full thickness ulceration with tendon exposed. Erythema and edema continue to improve.   Labs:      Latest Reference Range & Units 02/25/25 11:35   INR 0.87 - 1.13  1.03   PT 12.0 - 14.6 sec 13.5   APTT 24.7 - 36.0 sec 34.1   Heparin Xa (LMWH) U/mL <0.1   Dilute Jose Luis Viper Venom Ag 28.0 - 48.0 sec 46.5   Lupus Anticoagulant  Not Present   Protein C Functional 83 - 168 % 186 (H)   Protein S-Functional 57 - 131 % 107   Antithrombin III, Functional 76 - 128 % 132 (H)   Anti-Cardiolipin Ab Iga <=11 APL <10   Anti-Cardiolipin Ab Igm <=12 MPL <10   Anti-Cariolipin Ab Igg <=14 GPL <10   Beta 2 Microglobulin 0.8 - 2.4 mg/L 1.7   C3 Complement 90 - 180 mg/dL 200 (H)   Complement C4 10 - 40 mg/dL 43 (H)   (H): Data is abnormally high      Foot xray 3/19/25: No cortical bone destruction or periosteal reaction.     ASSESSMENT, & PLAN      Lipodermatosclerosis   Full thickness ulceration on right dorsal foot - wonder about underlying livedoid vasculopathy     Discussed that repeat punch biopsy on left shin more consistent with lipodermatosclerosis, a vascular disorder involving deep fat. This clinically fits better than morphea. Regarding involvement of right dorsal foot, ddx may also include underlying livedoid vasculopathy. Hypercoagulable work up previously unremarkable (cryos, anti phospholipid syndrome Abs, protein C/S/ antithrombin, PT/PTT). A few markers were slightly elevated but not significantly. Superficial would culture swab from ulcer 2/24/25 showing normal skin arelis only     - continue topical timolol drops to center of ulceration to aid with wound healing   - appreciate wound care assistance, continue follow up    - start trental 400 mg three times daily with meals. Discussed side effects. No history of bleeding disorders in herself or family.   - start hydroxychloroquine 200 mg daily (<5mg/kg/daily). Discussed side effects. She has ophthalmologist (wears glasses), recommend at next visit to inform she  is on this medication. She stated understanding.   - recommend daily baby aspirin 81 mg   - work note provided   - compression, elevation of leg as possible       Follow up: No follow-ups on file.        Sulma Mayberry MD   [facility]

## 2025-04-10 ENCOUNTER — DOCUMENTATION (OUTPATIENT)
Dept: WOUND CARE | Facility: MEDICAL CENTER | Age: 55
End: 2025-04-10
Payer: COMMERCIAL

## 2025-04-10 NOTE — PROGRESS NOTES
Please discuss plan of care with patient at next visit on 4/15/2025 as patient has a high co-pay and may not want to come to clinic weekly. May need to change to a different dressing and order supplies for patient.

## 2025-04-15 ENCOUNTER — OFFICE VISIT (OUTPATIENT)
Dept: WOUND CARE | Facility: MEDICAL CENTER | Age: 55
End: 2025-04-15
Attending: NURSE PRACTITIONER
Payer: COMMERCIAL

## 2025-04-15 DIAGNOSIS — T14.8XXA PAIN ASSOCIATED WITH WOUND: ICD-10-CM

## 2025-04-15 DIAGNOSIS — R52 PAIN ASSOCIATED WITH WOUND: ICD-10-CM

## 2025-04-15 DIAGNOSIS — L97.513 ULCER OF RIGHT FOOT WITH NECROSIS OF MUSCLE (HCC): ICD-10-CM

## 2025-04-15 PROCEDURE — 11043 DBRDMT MUSC&/FSCA 1ST 20/<: CPT

## 2025-04-15 PROCEDURE — 97607 NEG PRS WND THR NDME<=50SQCM: CPT

## 2025-04-15 PROCEDURE — 11043 DBRDMT MUSC&/FSCA 1ST 20/<: CPT | Performed by: NURSE PRACTITIONER

## 2025-04-15 NOTE — PROGRESS NOTES
Provider Encounter- Full Thickness wound    HISTORY OF PRESENT ILLNESS  Wound History:    START OF CARE IN CLINIC: 3/17/25    REFERRING PROVIDER: Sulma Mayberry MD     WOUND- Full Thickness Wound   LOCATION: Right dorsal third MTH     HISTORY: Patient initially noted a white dot to her right dorsal foot around October 2023.  It was not until she scratched the area and ulcer was noted around December 2024.  She denies any lesion formation.  Since then it has progressively worsened.  She followed up with her PCP who started her on triamcinolone.  She noticed some improvement.  She was referred to dermatology for further treatment and care.  She underwent punch biopsy on 1/7/25 of left medial calf and right dorsal foot showed consistent with morphea with sclerotic dermis and thinned epidermis.  negative for malignancy.  She continued with the Vaseline dressing.  Around 2/24/2025 she noticed worsening pain, swelling, purulence and erythema.  She was started on Keflex for 1 week followed by prednisone for 12 days which she completed.  She has since started timolol applying it twice a day.  She denies that there was ever a raised lesion but always rather an ulceration.  Current dressing treatment consisting of timolol twice daily followed by Vaseline and a Band-Aid.  She is performing soaks of her feet with water and vinegar.  She has not had significant improvement and was referred to [facility] wound care clinic for further treatment and care.  Ulcer has caused her to have difficulty walking because of the pain she is walking on lateral aspect of her foot.      Pertinent Medical History: Dyslipidemia, hypertension, thyroidectomy, prediabetes    TOBACCO USE:  no , no alcohol use, no drug use   Works at [employer].  Standing on feet 8 hours a day still toed shoes.  Patient's problem list, allergies, and current medications reviewed and updated in Epic    Interval History:  3/17/2025: Initial wound evaluation,  initial provider Clinic visit with Aditi MCKEON, MAHENDRA, HALLEY, DELMA.  Pt denies fevers, chills, nausea, vomiting.  Accompanied by daughter to appointment.  X-ray third toe ordered rule out osteomyelitis.  Tendon exposed.  Hold timolol at this time.  Initiate Puracyn gel, Hydrofera Blue.  Consider snap VAC if no pathergy noted.      4/15/2025 : Clinic visit with LINDA Epperson, MAHENDRA, THUAN, DELMA.   Patient states she is feeling well.  Tolerating SNAP without any difficulty.  Her wound is showing some improvement, new granulation buds around the edges, area has decreased.          REVIEW OF SYSTEMS:   Unchanged from previous clinic visit on 3/17/2025, except as documented in interval history above    PHYSICAL EXAMINATION:   [facility] 06/19/2017     Physical Exam  Cardiovascular:      Pulses: Normal pulses.      Comments: +2 DP, +2 PT to right foot easily palpable  Pulmonary:      Effort: Pulmonary effort is normal.   Musculoskeletal:         General: Swelling and tenderness present.      Right lower leg: Edema present.      Left lower leg: Edema present.      Comments: Hemosiderin staining to right dorsal forefoot, faint hemosiderin staining to bilateral medial calf   Skin:     Comments: Right dorsal third MTH: Full-thickness.  Wound area has decreased.  New granulation tissue noted near edges.  Minimal to moderate serosanguineous drainage, no odor.  No periwound erythema or induration.   Neurological:      General: No focal deficit present.      Mental Status: She is alert.         WOUND ASSESSMENT  Wound 03/17/25 Full Thickness Wound Foot Dorsal Right (Active)   Wound Image    04/15/25 1000   Site Assessment Red;Yellow;Other (Comment) 04/15/25 1000   Periwound Assessment Hyperpigmented 04/15/25 1000   Margins Defined edges 04/15/25 1000   Closure Secondary intention 04/01/25 1500   Drainage Amount Small 04/15/25 1000   Drainage Description Serosanguineous 04/15/25 1000   Treatments Cleansed;Topical  Lidocaine;Provider debridement 04/15/25 1000   Wound Cleansing Normal Saline Irrigation 04/15/25 1000   Periwound Protectant No-sting Skin Prep;Paste Ring 04/15/25 1000   Dressing Status Old drainage 04/01/25 1500   Dressing Changed Changed 04/01/25 1500   Dressing Cleansing/Solutions Not Applicable 04/15/25 1000   Dressing Options Adaptic;Wound Vac;Tubigrip 04/15/25 1000   Dressing Change/Treatment Frequency Weekly, and As Needed 04/15/25 1000   Wound Team Following Weekly 04/15/25 1000   Non-staged Wound Description Full thickness 04/15/25 1000   Wound Length (cm) 0.8 cm 04/15/25 1000   Wound Width (cm) 0.6 cm 04/15/25 1000   Wound Depth (cm) 0.2 cm 04/15/25 1000   Wound Surface Area (cm^2) 0.48 cm^2 04/15/25 1000   Wound Volume (cm^3) 0.096 cm^3 04/15/25 1000   Post-Procedure Length (cm) 1 cm 04/15/25 1000   Post-Procedure Width (cm) 0.7 cm 04/15/25 1000   Post-Procedure Depth (cm) 0.2 cm 04/15/25 1000   Post-Procedure Surface Area (cm^2) 0.7 cm^2 04/15/25 1000   Post-Procedure Volume (cm^3) 0.14 cm^3 04/15/25 1000   Wound Healing % -7 04/15/25 1000   Wound Bed Slough (%) 100 % 03/17/25 0800   Tunneling (cm) 0 cm 04/15/25 1000   Undermining (cm) 0 cm 04/15/25 1000   Undermining of Wound, 1st Location From 10 o'clock;To 2 o'clock 03/17/25 0800   Wound Odor None 04/15/25 1000   Pulses Right;2+;DP 04/15/25 1000   Exposed Structures Tendon 04/15/25 1000   Number of days: 29       PROCEDURE: Right dorsal third MTH  -2% viscous lidocaine applied topically to wound bed for approximately 5 minutes prior to debridement  -Curette used to debride wound bed and open wound edges.  Excisional debridement was performed to remove devitalized tissue until healthy, bleeding tissue was visualized.   Entire surface of wound, 0.7 cm² debrided.  Tissue debrided into the muscle/fascia layer  -Bleeding controlled with manual pressure.    -Wound care completed by wound RN, refer to flowsheet  -Patient tolerated the procedure well,  without c/o pain or discomfort.       Pertinent Labs and Diagnostics:    Labs:     A1c:   Lab Results   Component Value Date/Time    HBA1C 5.8 (H) 12/23/2024 09:57 AM          IMAGING:   3/19/2025-x-ray of right third toe  IMPRESSION:     No cortical bone destruction or periosteal reaction.          VASCULAR STUDIES: None    LAST  WOUND CULTURE:  DATE :   Lab Results   Component Value Date/Time    CULTRSULT Light growth usual skin arelis. (A) 02/24/2025 03:50 PM    CULTRSULT Staphylococcus lugdunensis  Light growth   (A) 02/24/2025 03:50 PM    CULTRSULT Corynebacterium amycolatum  Heavy growth   (A) 02/24/2025 03:50 PM    CULTRSULT (A) 02/24/2025 03:50 PM     Anaerobic Gram positive cocci  Moderate growth  Peptoniphilus species  Organism identified by [employer] research use only library.           ASSESSMENT AND PLAN:     1. 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  Biopsy performed by dermatology, nonmalignant pathology.  - Wound area has decreased, evidence new cannulation.  Tendon still exposed.  - Excisional debridement performed today.  Medically necessary to promote wound healing.  - Patient to return to clinic weekly for assessment, debridement, and SNAP placement  - SNAP troubleshooting reviewed with patient  - Continue timolol hold  - X-ray completed on 3/19, no evidence of OM      Wound care: SNAP, Tubigrip    2.  Pain associated with wound    4/15/2025: Per patient less painful, though debridement is still uncomfortable   -2% viscous lidocaine applied topically to wound bed for approximately 5 minutes prior to debridement  -Patient tolerated procedure today with minimal complaints of discomfort.   - Denies any autoimmune disorders.  Possible concern for pyoderma gangrenosum.  Continue to monitor for worsening signs and symptoms/pathergy        PATIENT EDUCATION  - Importance of adequate nutrition for wound healing  -Advised to  go to ER for any increased redness, swelling, drainage, or odor, or if patient develops fever, chills, nausea or vomiting.         Please note that this note may have been created using voice recognition software. I have worked with technical experts from [facility] to optimize the interface.  I have made every reasonable attempt to correct obvious errors, but there may be errors of grammar and possibly content that I did not discover before finalizing the note.    N

## 2025-04-15 NOTE — PATIENT INSTRUCTIONS
-Keep your wound dressing clean, dry, and intact. Only change dressing if it's over saturated, soiled or falls off.     -Change your dressing if it becomes soiled, soaked, or falls off.    -Wound vac may not have any drainage in tube or cannister & it will still be working.   Change cannister if it does become full by pressing tab on side of machine to remove canister and snap on new one. Full canister can be thrown in the trash. If cannister fills with bright red blood - go to ER. Dressing will be changed every MWF at the wound clini.  If you are having issues with your wound VAC, please consider patching leaks, changing the canister, or calling [phone] for troubleshooting. If the wound VAC has been off or un-operational for over 2 hours, call wound care center to inform them and remove all dressings including black foam and replace with normal saline damp gauze.     -Should you experience any significant changes in your wound(s), such as infection (redness, swelling, localized heat, increased pain, fever > 101 F, chills) or have any questions regarding your home care instructions, please contact the wound center at [phone]. If after hours, contact your primary care physician or go to the hospital emergency room.     If you are admitted to any hospital, you will need a new referral to come back to the wound clinic and any scheduled appointments that you already have, may be cancelled.

## 2025-04-15 NOTE — LETTER
April 15, 2025        RE: Ines Soliz    To whom it may concern,     Ines is being seen in our clinic for a foot wound.  In order for this wound to heal, she must continue offloading is much as possible, and therefore must take more time off from work.  I estimate her return to work will be May 23, 2025.  Please feel free to contact me for any questions or concerns.          Selin Dumas APRN, FNP-BC, CWOCN, CFCN       Subjective:       Patient ID: Nicole Cardoso is a 38 y.o. female.    Chief Complaint:  Breast Mass, STD CHECK, and Vaginal Discharge      History of Present Illness  Vaginal Discharge  The patient's primary symptoms include vaginal discharge.     Vaginal Discharge and Irritation  Patient presents for vaginal discharge check. Sexual history reviewed with the patient. STD exposure: denies knowledge of risky exposure.  Previous history of STD:  none. Current symptoms include none.  Contraception: Ortho-Evra patches weekly.    GYN & OB History  Patient's last menstrual period was 2024 (approximate).   Date of Last Pap: 2024    OB History    Para Term  AB Living   4 2 2   2 2   SAB IAB Ectopic Multiple Live Births   2       2      # Outcome Date GA Lbr Raul/2nd Weight Sex Type Anes PTL Lv   4 Term      CS-LTranv   DENISSE   3 Term      CS-LTranv   DENISSE   2 SAB            1 SAB                Review of Systems  Review of Systems   Constitutional: Negative.    HENT: Negative.     Eyes: Negative.    Respiratory: Negative.     Cardiovascular: Negative.    Gastrointestinal: Negative.    Genitourinary:  Positive for vaginal discharge.   Musculoskeletal: Negative.    Integumentary:  Negative.   Neurological: Negative.    Hematological: Negative.    Psychiatric/Behavioral: Negative.     All other systems reviewed and are negative.  Breast: negative.            Objective:      Physical Exam:   Constitutional: She is oriented to person, place, and time. She appears well-developed and well-nourished.    HENT:   Head: Normocephalic and atraumatic.   Nose: Nose normal.    Eyes: Pupils are equal, round, and reactive to light. Conjunctivae and EOM are normal.     Cardiovascular:  Normal rate and regular rhythm.             Pulmonary/Chest: Effort normal.        Abdominal: Soft. She exhibits no distension. There is no abdominal tenderness. Hernia confirmed negative in the right inguinal area and confirmed  negative in the left inguinal area.     Genitourinary:    Inguinal canal, vagina, uterus, right adnexa, left adnexa and rectum normal.      Pelvic exam was performed with patient supine.     Labial bartholins normal.There is no rash, tenderness, lesion or injury on the right labia. There is no rash, tenderness, lesion or injury on the left labia. Cervix is normal. Right adnexum displays no mass, no tenderness and no fullness. Left adnexum displays no mass, no tenderness and no fullness. No erythema, vaginal discharge, rectocele or cystocele in the vagina. Vagina was moist.          Musculoskeletal: Normal range of motion and moves all extremeties.      Lymphadenopathy: No inguinal adenopathy noted on the right or left side.    Neurological: She is alert and oriented to person, place, and time.    Skin: Skin is warm and dry. She is not diaphoretic.    Psychiatric: She has a normal mood and affect. Her behavior is normal. Judgment and thought content normal.             Assessment:        1. Screen for STD (sexually transmitted disease)               Plan:   Continue annual well woman exam.    Diagnosis and orders this visit:  Screen for STD (sexually transmitted disease)  -     C. trachomatis/N. gonorrhoeae by AMP DNA  -     HIV 1/2 Ag/Ab (4th Gen); Future; Expected date: 01/08/2025  -     Hepatitis Panel, Acute; Future; Expected date: 01/08/2025  -     Treponema Pallidium Antibodies IgG, IgM; Future; Expected date: 01/08/2025      Kiersten Antonio NP

## 2025-04-15 NOTE — PROGRESS NOTES
Snap vac removed with 1 blue foam removed.      Snap vac re-applied using paste ring and 1 piece of blue foam.  Vac started with no leaks detected.  Tensogrip D with hole cut to run tubing to outside. Strap placed to calf.

## 2025-04-22 ENCOUNTER — NON-PROVIDER VISIT (OUTPATIENT)
Dept: WOUND CARE | Facility: MEDICAL CENTER | Age: 55
End: 2025-04-22
Attending: NURSE PRACTITIONER
Payer: COMMERCIAL

## 2025-04-22 PROCEDURE — 97607 NEG PRS WND THR NDME<=50SQCM: CPT

## 2025-04-22 NOTE — PROGRESS NOTES
Snap vac removed by CNA with 1 blue foam removed, no retained foam noted.      Snap vac re-applied using paste ring and 1 piece of blue foam.  NPWT resumed with no leaks detected.  Tensogrip D with hole cut to run tubing to outside. Strap placed to calf.    Tendon noted to be fraying today, requested for provider visit, next appt, to assess for any possible to change in POC. Updated check out sheet for vac 30 appts.

## 2025-04-22 NOTE — PATIENT INSTRUCTIONS
-Keep your wound dressing clean, dry, and intact. Only change dressing if it's over saturated, soiled or falls off.     -Wound vac may not have any drainage in tube or cannister & it will still be working.   Change cannister if it does become full by pressing tab on side of machine to remove canister and snap on new one. Full canister can be thrown in the trash. If cannister fills with bright red blood - go to ER. Dressing will be changed every MWF at the wound clini.  If you are having issues with your wound VAC, please consider patching leaks, changing the canister, or calling [phone] for troubleshooting. If the wound VAC has been off or un-operational for over 2 hours, call wound care center to inform them and remove all dressings including black foam and replace with normal saline damp gauze.     -Should you experience any significant changes in your wound(s), such as infection (redness, swelling, localized heat, increased pain, fever > 101 F, chills) or have any questions regarding your home care instructions, please contact the wound center at [phone]. If after hours, contact your primary care physician or go to the hospital emergency room.

## 2025-04-23 RX ORDER — TIMOLOL MALEATE 5 MG/ML
SOLUTION/ DROPS OPHTHALMIC
Qty: 10 ML | Refills: 1 | Status: SHIPPED | OUTPATIENT
Start: 2025-04-23

## 2025-04-29 ENCOUNTER — OFFICE VISIT (OUTPATIENT)
Dept: WOUND CARE | Facility: MEDICAL CENTER | Age: 55
End: 2025-04-29
Attending: NURSE PRACTITIONER
Payer: COMMERCIAL

## 2025-04-29 DIAGNOSIS — L97.513 ULCER OF RIGHT FOOT WITH NECROSIS OF MUSCLE (HCC): ICD-10-CM

## 2025-04-29 DIAGNOSIS — R52 PAIN ASSOCIATED WITH WOUND: ICD-10-CM

## 2025-04-29 DIAGNOSIS — T14.8XXA PAIN ASSOCIATED WITH WOUND: ICD-10-CM

## 2025-04-29 PROCEDURE — 11042 DBRDMT SUBQ TIS 1ST 20SQCM/<: CPT

## 2025-04-29 PROCEDURE — 11043 DBRDMT MUSC&/FSCA 1ST 20/<: CPT

## 2025-04-29 PROCEDURE — 99213 OFFICE O/P EST LOW 20 MIN: CPT

## 2025-04-29 PROCEDURE — 11043 DBRDMT MUSC&/FSCA 1ST 20/<: CPT | Performed by: NURSE PRACTITIONER

## 2025-04-29 NOTE — PROGRESS NOTES
Provider Encounter- Full Thickness wound    HISTORY OF PRESENT ILLNESS  Wound History:    START OF CARE IN CLINIC: 3/17/25    REFERRING PROVIDER: Sulma Mayberry MD     WOUND- Full Thickness Wound   LOCATION: Right dorsal third MTH     HISTORY: Patient initially noted a white dot to her right dorsal foot around October 2023.  It was not until she scratched the area and ulcer was noted around December 2024.  She denies any lesion formation.  Since then it has progressively worsened.  She followed up with her PCP who started her on triamcinolone.  She noticed some improvement.  She was referred to dermatology for further treatment and care.  She underwent punch biopsy on 1/7/25 of left medial calf and right dorsal foot showed consistent with morphea with sclerotic dermis and thinned epidermis.  negative for malignancy.  She continued with the Vaseline dressing.  Around 2/24/2025 she noticed worsening pain, swelling, purulence and erythema.  She was started on Keflex for 1 week followed by prednisone for 12 days which she completed.  She has since started timolol applying it twice a day.  She denies that there was ever a raised lesion but always rather an ulceration.  Current dressing treatment consisting of timolol twice daily followed by Vaseline and a Band-Aid.  She is performing soaks of her feet with water and vinegar.  She has not had significant improvement and was referred to [facility] wound care clinic for further treatment and care.  Ulcer has caused her to have difficulty walking because of the pain she is walking on lateral aspect of her foot.      Pertinent Medical History: Dyslipidemia, hypertension, thyroidectomy, prediabetes    TOBACCO USE:  no , no alcohol use, no drug use   Works at [employer].  Standing on feet 8 hours a day still toed shoes.  Patient's problem list, allergies, and current medications reviewed and updated in Epic    Interval History:  3/17/2025: Initial wound evaluation,  initial provider Clinic visit with MAHENDRA Armijo, DELMA ROWLEY.  Pt denies fevers, chills, nausea, vomiting.  Accompanied by daughter to appointment.  X-ray third toe ordered rule out osteomyelitis.  Tendon exposed.  Hold timolol at this time.  Initiate Puracyn gel, Hydrofera Blue.  Consider snap VAC if no pathergy noted.      4/15/2025 : Clinic visit with LINDA Epperson FNP-BC, DELMA LAROSE.   Patient states she is feeling well.  Tolerating SNAP without any difficulty.  Her wound is showing some improvement, new granulation buds around the edges, area has decreased.    4/29/2025: Clinic visit with MAHENDRA Armijo, DELMA ROWLEY.  Pt denies fevers, chills, nausea, vomiting.  Patient reports she has a $300 co-pay each visit and her insurance will stop end of July 2025.  Tendon remains exposed, area decreased slightly.  Add LPS rounds to discuss surgical options.  Authorization for cellular tissue product requested.        REVIEW OF SYSTEMS:   Unchanged from previous clinic visit on 4/15/2025, except as documented in interval history above    PHYSICAL EXAMINATION:   LMP 06/19/2017     Physical Exam  Cardiovascular:      Pulses: Normal pulses.      Comments: +2 DP, +2 PT to right foot easily palpable  Pulmonary:      Effort: Pulmonary effort is normal.   Musculoskeletal:         General: Swelling and tenderness present.      Right lower leg: Edema present.      Left lower leg: Edema present.      Comments: Hemosiderin staining to right dorsal forefoot, faint hemosiderin staining to bilateral medial calf   Skin:     Comments: Right dorsal third MTH: Full-thickness.  Wound area has decreased.  New granulation tissue noted near edges.  Tendon remains exposed, frayed.  Remains moist.  Minimal serosanguineous drainage, no odor.  No periwound erythema or induration.  Maceration to periwound   Neurological:      General: No focal deficit present.      Mental Status: She is alert.         WOUND  ASSESSMENT  Wound 03/17/25 Full Thickness Wound Foot Dorsal Right (Active)   Wound Image    04/29/25 1400   Site Assessment Red;Yellow;Epithelialization 04/29/25 1400   Periwound Assessment Scar tissue 04/29/25 1400   Margins Unattached edges 04/29/25 1400   Closure None 04/29/25 1400   Drainage Amount Small 04/29/25 1400   Drainage Description Serosanguineous 04/29/25 1400   Treatments Topical Lidocaine;Site care;Provider debridement 04/29/25 1400   Wound Cleansing Hypochlorus Acid 04/29/25 1400   Periwound Protectant No-sting Skin Prep;Paste Ring 04/29/25 1400   Dressing Status Old drainage 04/01/25 1500   Dressing Changed Changed 04/29/25 1400   Dressing Cleansing/Solutions Not Applicable 04/29/25 1400   Dressing Options Collagen Dressing;Adaptic;Wound Vac;Tubigrip 04/29/25 1400   Dressing Change/Treatment Frequency Weekly, and As Needed 04/29/25 1400   Wound Team Following Weekly 04/29/25 1400   Non-staged Wound Description Full thickness 04/29/25 1400   Wound Length (cm) 1.3 cm 04/29/25 1400   Wound Width (cm) 1 cm 04/29/25 1400   Wound Depth (cm) 0.2 cm 04/29/25 1400   Wound Surface Area (cm^2) 1.3 cm^2 04/29/25 1400   Wound Volume (cm^3) 0.26 cm^3 04/29/25 1400   Post-Procedure Length (cm) 1.3 cm 04/29/25 1400   Post-Procedure Width (cm) 0.5 cm 04/29/25 1400   Post-Procedure Depth (cm) 0.2 cm 04/29/25 1400   Post-Procedure Surface Area (cm^2) 0.65 cm^2 04/29/25 1400   Post-Procedure Volume (cm^3) 0.13 cm^3 04/29/25 1400   Wound Healing % -189 04/29/25 1400   Wound Bed Slough (%) 100 % 03/17/25 0800   Tunneling (cm) 0 cm 04/29/25 1400   Undermining (cm) 0 cm 04/29/25 1400   Undermining of Wound, 1st Location From 10 o'clock;To 2 o'clock 03/17/25 0800   Wound Odor None 04/29/25 1400   Pulses Left;DP;2+ 04/22/25 1400   Exposed Structures Tendon 04/29/25 1400   Number of days: 43       PROCEDURE: Right dorsal third MTH  -2% viscous lidocaine applied topically to wound bed for approximately 5 minutes prior to  debridement  -Curette used to debride wound bed and open wound edges.  Excisional debridement was performed to remove devitalized tissue until healthy, bleeding tissue was visualized.   Entire surface of wound, 0.65 cm² debrided.  Tissue debrided into the muscle/fascia layer  -Bleeding controlled with manual pressure.    -Wound care completed by wound RN, refer to flowsheet  -Patient tolerated the procedure well, without c/o pain or discomfort.       Pertinent Labs and Diagnostics:    Labs:     A1c:   Lab Results   Component Value Date/Time    HBA1C 5.8 (H) 12/23/2024 09:57 AM          IMAGING:   3/19/2025-x-ray of right third toe  IMPRESSION:     No cortical bone destruction or periosteal reaction.          VASCULAR STUDIES: None    LAST  WOUND CULTURE:  DATE :   Lab Results   Component Value Date/Time    CULTRSULT Light growth usual skin arelis. (A) 02/24/2025 03:50 PM    CULTRSULT Staphylococcus lugdunensis  Light growth   (A) 02/24/2025 03:50 PM    CULTRSULT Corynebacterium amycolatum  Heavy growth   (A) 02/24/2025 03:50 PM    CULTRSULT (A) 02/24/2025 03:50 PM     Anaerobic Gram positive cocci  Moderate growth  Peptoniphilus species  Organism identified by [employer] research use only library.           ASSESSMENT AND PLAN:     1. 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  Biopsy performed by dermatology, nonmalignant pathology.  - Wound area has decreased slightly.  Tendon remains exposed, functional.  Beginning to fray, remains moist.,   - Excisional debridement performed today.  Medically necessary to promote wound healing.  - Patient to return to clinic weekly for assessment, debridement, and SNAP placement  - SNAP troubleshooting reviewed with patient  - Continue timolol hold  - X-ray completed on 3/19, no evidence of OM  -Patient co-pay $300 each visit.  Reports her insurance will stop end of July 2025.  Wound has not made  significant progress.  Tendon remains exposed.  Added to LPS rounds 5/9/2025 to discuss surgical options, possible I&D with closure versus flap procedure  - Patient instructed to leave snap VAC in place until 5/6/2025.  She is to remove device and apply collagen and adhesive foam dressing until her follow-up appointment for LPS on 5/9/2025.  - Prior authorization for cellular tissue product EpiFix/epi cord requested.  Patient has no evidence of infection.  Wound has been present for several months with tendon exposure.  Wound is on the dorsal aspect of her foot.  Wearing shoe wear that does not interfere with the ulcer.    Wound care: Collagen, SNAP, Tubigrip    2.  Pain associated with wound    4/29/2025: Per patient less painful, though debridement is still uncomfortable   -2% viscous lidocaine applied topically to wound bed for approximately 5 minutes prior to debridement  -Patient tolerated procedure today with minimal complaints of discomfort.   - Denies any autoimmune disorders.  Possible concern for pyoderma gangrenosum.  Patient continues to tolerate debridement and ulcer has made progress with debridement.  Continue to monitor for worsening signs and symptoms/pathergy        PATIENT EDUCATION  - Importance of adequate nutrition for wound healing  -Advised to go to ER for any increased redness, swelling, drainage, or odor, or if patient develops fever, chills, nausea or vomiting.     My total time spent caring for the patient on the day of the encounter was 20 minutes, reviewing history, assessment, counseling and education, and coordination of care.  This does not include time spent on separately billable procedures/tests.      Please note that this note may have been created using voice recognition software. I have worked with technical experts from [facility]"[employer] [facility] to optimize the interface.  I have made every reasonable attempt to correct obvious errors, but there may be errors of grammar and possibly  content that I did not discover before finalizing the note.    N

## 2025-04-29 NOTE — PATIENT INSTRUCTIONS
"The following information is a summary of the education provided in the clinic today. This is not an exhaustive list of the education provided during your appointment.       DRESSING CHANGES    -Keep your wound dressing clean, dry, and intact. Change your dressing every Week at the clinic AND/OR if it's, soiled, leaks, gets wet, or falls off.      -You may not shower with the dressing(s) off. Please do not take baths, or swim in the ocean, lakes, rivers, pools, or hot tubs.      -Wounds do not need to \"air out\" or \"breathe\". Gently dry your wound before placing a new dressing.     -After you get out of the shower, wash the wound a second time (with soap and water, wound cleanser, or saline). Gently dry the wound before you place a new dressing.       -If you need to change your dressings at home, you should wash your wound. Use normal saline, wound cleanser, hypochlorous acid, or unscented soap and water. Do not use hydrogen peroxide or rubbing alcohol to clean your wounds. Hydrogen peroxide and rubbing alcohol will damage new cells and tissue. Do not use betadine or iodine unless told to by your wound care team.    -If you do not have home health, the clinic will give you with leftover supplies from your appointment. We do not give out extra dressing supplies. We will order you supplies through a DME company. Your insurance may or may not pay for all these supplies. The company will reach out to you if insurance does not cover supplies. These supplies will be sent to your home within a few days. If you do have home health, they will provide wound care supplies.     -The dressings we use may change as your wound changes.         COMPRESSION   -Compression helps reduce swelling and helps wounds heal quicker. It is still important to elevate your feet several times daily to reduce swelling, even when you are wearing compression. It is important to wear compression every day, to help your wound heal, and to prevent new " wounds from developing.    and  -Today, a compression sock was applied. If you have pain, extreme swelling, new numbness or tingling in your feet, or a change in the color or temperature of your feet, please remove the garments. You should wear your compression garments every day. You can take them off at night, but they should be put back on every morning before you begin walking around.         GENERAL HEALTH ADVICE   -High blood sugar, like with diabetes, can delay or reverse wound healing by impacting your immune system. It also increases the chances of infection. Wounds heal best when your blood sugar is consistently less than 140 mg/dL. It is important to check your blood sugar regularly.    and  -Nutrition is important for wound healing. Unless told otherwise by your doctor, eat more protein and consider supplementing with a multi-vitamin, zinc, and vitamin C.           CLINIC INFORMATION  -The clinic's hours are Monday-Friday, 7:30 AM to 5:00 PM. We are closed most holidays and on weekends. If you leave us a message, please allow 24 hours for someone to return your call. If you have concerns or are having a medical emergency, call 911 or go to the hospital emergency room.     -You might not see the same nurse or provider every visit.     -If you notice any large changes in your wound(s), or signs of infection (redness, swelling, localized heat, increased pain, fever > 101 F, chills, nausea/vomiting) or have any questions about your home care instructions, please call the wound center at [phone]. If it's after hours, contact your primary care physician or go to the hospital emergency room. If you are admitted to any hospital, you will need a new referral to come back to the wound clinic. Any wound care appointments that you already have may be cancelled.    -If you are 5 or more minutes late for an appointment, we reserve the right to cancel and reschedule that appointment. For example, if your  appointment is at 1:00 PM, and you arrive at 1:06 PM, you are more than five minutes late and might not be seen. If you are consistently late or not coming to your appointments (typically 3 late cancellations and/or no shows), we reserve the right to cancel your future appointments or discharge you from the clinic. It is then your responsibility to obtain a new referral if wound care is still needed.    ydrogen peroxide and rubbing alcohol will damage new cells and tissue. Do not use betadine or iodine unless told to by your wound care team.

## 2025-04-29 NOTE — PROGRESS NOTES
SNAP vac removed by CNA. No retained foam noted. SNAP vac reapplied using 1 small pc of foam. Suction achieved and tubi stocking reapplied. Gave pt left over rajinder and bandage should SNAP vac need to be removed.     Pt to be placed on LPS round per APRN, pending biologic approval. See provider note.

## 2025-05-01 ENCOUNTER — DOCUMENTATION (OUTPATIENT)
Dept: WOUND CARE | Facility: MEDICAL CENTER | Age: 55
End: 2025-05-01
Payer: COMMERCIAL

## 2025-05-01 NOTE — PROGRESS NOTES
Prior authorization requested for Epicord and/or Epifix to be applied to right dorsal foot wound submitted via [employer] portal.

## 2025-05-06 ENCOUNTER — APPOINTMENT (OUTPATIENT)
Dept: WOUND CARE | Facility: MEDICAL CENTER | Age: 55
End: 2025-05-06
Attending: NURSE PRACTITIONER
Payer: COMMERCIAL

## 2025-05-06 ENCOUNTER — DOCUMENTATION (OUTPATIENT)
Dept: WOUND CARE | Facility: MEDICAL CENTER | Age: 55
End: 2025-05-06
Payer: COMMERCIAL

## 2025-05-06 NOTE — PROGRESS NOTES
LIMB PRESERVATION SERVICE ROUNDS CHECK LIST    ROUNDS DATE: 5/9/2025      LOCATION OF WOUND: Right Dorsal 3rd Metatarsal Head  WOUND START DATE: October 2023    ISSUES / Reason for LPS:   Per Aditi MCKEON note 4/29/2025:  Patient co-pay $300 each visit.  Reports her insurance will stop end of July 2025.  Wound has not made significant progress.  Tendon remains exposed.  Added to LPS rounds 5/9/2025 to discuss surgical options, possible I&D with closure versus flap procedure     Surgical Recommendation:  Undetermined    OFFLOADING  Offloading Device: None  Orthotist Involved? No           TOBACCO USE:       Pertinent Labs and Diagnostics:    LABS  A1c:   Lab Results   Component Value Date/Time    HBA1C 5.8 (H) 12/23/2024 09:57 AM        LPS blood sugar: ________________      LAST  WOUND CULTURE:    Lab Results   Component Value Date/Time    CULTRSULT Light growth usual skin arelis. (A) 02/24/2025 03:50 PM    CULTRSULT Staphylococcus lugdunensis  Light growth   (A) 02/24/2025 03:50 PM    CULTRSULT Corynebacterium amycolatum  Heavy growth   (A) 02/24/2025 03:50 PM    CULTRSULT (A) 02/24/2025 03:50 PM     Anaerobic Gram positive cocci  Moderate growth  Peptoniphilus species  Organism identified by [employer] research use only library.           On Antibiotics? No     VASCULAR STUDIES:   No results found.  MARGARITA N/A      IMAGING:   Foot/Toes Imaging, Past Year DX-TOE(S) 2+ RIGHT  Result Date: 3/19/2025  Narrative 3/19/2025 12:49 PM HISTORY/REASON FOR EXAM: . Soft tissue swelling and ulceration. TECHNIQUE/EXAM DESCRIPTION AND NUMBER OF VIEWS:  3 views of the RIGHT toes. COMPARISON: None FINDINGS: No acute fracture identified. The MTP joints appear to be preserved. No cortical bone destruction or periosteal reaction second through fourth metatarsals. No soft tissue calcifications.     Impression No cortical bone destruction or periosteal reaction.

## 2025-05-09 ENCOUNTER — NON-PROVIDER VISIT (OUTPATIENT)
Dept: WOUND CARE | Facility: MEDICAL CENTER | Age: 55
End: 2025-05-09
Attending: NURSE PRACTITIONER
Payer: COMMERCIAL

## 2025-05-09 DIAGNOSIS — R52 PAIN ASSOCIATED WITH WOUND: ICD-10-CM

## 2025-05-09 DIAGNOSIS — T14.8XXA PAIN ASSOCIATED WITH WOUND: ICD-10-CM

## 2025-05-09 DIAGNOSIS — L97.513 ULCER OF RIGHT FOOT WITH NECROSIS OF MUSCLE (HCC): ICD-10-CM

## 2025-05-09 PROCEDURE — 99214 OFFICE O/P EST MOD 30 MIN: CPT | Performed by: NURSE PRACTITIONER

## 2025-05-09 PROCEDURE — 99214 OFFICE O/P EST MOD 30 MIN: CPT

## 2025-05-09 NOTE — PROGRESS NOTES
LIMB PRESERVATION SERVICE ROUNDS    Referral to LPS Rounds from: Wound clinic     WOUND HISTORY: Patient initially noted a white dot to her right dorsal foot around October 2023.  It was not until she scratched the area and ulcer was noted around December 2024.  She denies any lesion formation.  Since then it has progressively worsened.  She followed up with her PCP who started her on triamcinolone.  She noticed some improvement.  She was referred to dermatology for further treatment and care.  She underwent punch biopsy on 1/7/25 of left medial calf and right dorsal foot showed consistent with morphea with sclerotic dermis and thinned epidermis.  negative for malignancy.  She continued with the Vaseline dressing.  Around 2/24/2025 she noticed worsening pain, swelling, purulence and erythema.  She was started on Keflex for 1 week followed by prednisone for 12 days which she completed.  She has since started timolol applying it twice a day.  She denies that there was ever a raised lesion but always rather an ulceration.  Current dressing treatment consisting of timolol twice daily followed by Vaseline and a Band-Aid.  She is performing soaks of her feet with water and vinegar.  She has not had significant improvement and was referred to [facility] wound care clinic for further treatment and care.  Ulcer has caused her to have difficulty walking because of the pain she is walking on lateral aspect of her foot.    Wound has not progressed significantly after a month and a half of treatment in the clinic, tendon has been exposed.  Patient was referred to [facility] for further evaluation.  Possible I&D with closure, versus flap procedure.          TOBACCO USE:   She has never smoked or use smokeless tobacco      LPS Rounds Interval notes:  5/9/2025: [facility] Rounds with Dr. Condon.  Patient's wound actually measures significantly smaller today, 0.4 cm² today, decreased from 1.3 cm² on 4/29.  Patient removed SNAP 2 days ago.  Given  current progress of wound, no surgery recommended.  However, Dr. Condon recommended that she be sent to [facility] if wound stalls or deteriorates.               Pertinent Labs and Diagnostics:    Labs:     A1c:   Lab Results   Component Value Date/Time    HBA1C 5.8 (H) 12/23/2024 09:57 AM          IMAGING: Foot/Toes Imaging, Past Year DX-TOE(S) 2+ RIGHT  Result Date: 3/19/2025  Narrative 3/19/2025 12:49 PM HISTORY/REASON FOR EXAM: . Soft tissue swelling and ulceration. TECHNIQUE/EXAM DESCRIPTION AND NUMBER OF VIEWS:  3 views of the RIGHT toes. COMPARISON: None FINDINGS: No acute fracture identified. The MTP joints appear to be preserved. No cortical bone destruction or periosteal reaction second through fourth metatarsals. No soft tissue calcifications.     Impression No cortical bone destruction or periosteal reaction.      VASCULAR STUDIES: No results found.    LAST  WOUND CULTURE:  DATE :        Lab Results   Component Value Date/Time    CULTRSULT Light growth usual skin arelis. (A) 02/24/2025 03:50 PM    CULTRSULT Staphylococcus lugdunensis  Light growth   (A) 02/24/2025 03:50 PM    CULTRSULT Corynebacterium amycolatum  Heavy growth   (A) 02/24/2025 03:50 PM    CULTRSULT (A) 02/24/2025 03:50 PM     Anaerobic Gram positive cocci  Moderate growth  Peptoniphilus species  Organism identified by [employer] research use only library.                           OBJECTIVE FINDINGS:      Pulses: DP and PT pulses easily palpated, +2     Wound(s):    Wound 03/17/25 Full Thickness Wound Foot Dorsal Right (Active)   Wound Image    04/29/25 1400   Site Assessment Red;Yellow 05/09/25 0839   Periwound Assessment Dry;Scar tissue 05/09/25 0839   Margins Unattached edges 05/09/25 0839   Closure None 04/29/25 1400   Drainage Amount Small 05/09/25 0839   Drainage Description Serosanguineous 05/09/25 0839   Treatments Cleansed;Site care 05/09/25 0839   Wound Cleansing Hypochlorus Acid 05/09/25 0839   Periwound Protectant No-sting Skin Prep  05/09/25 0839   Dressing Status Old drainage 04/01/25 1500   Dressing Changed Changed 04/29/25 1400   Dressing Cleansing/Solutions Not Applicable 05/09/25 0839   Dressing Options Honey Gel;Nonadhesive Foam;Hypafix Tape;Tubigrip 05/09/25 0839   Dressing Change/Treatment Frequency Weekly, and As Needed 05/09/25 0839   Wound Team Following Weekly 04/29/25 1400   Non-staged Wound Description Full thickness 05/09/25 0839   Wound Length (cm) 1 cm 05/09/25 0839   Wound Width (cm) 0.4 cm 05/09/25 0839   Wound Depth (cm) 0.2 cm 05/09/25 0839   Wound Surface Area (cm^2) 0.4 cm^2 05/09/25 0839   Wound Volume (cm^3) 0.08 cm^3 05/09/25 0839   Post-Procedure Length (cm) 1.3 cm 04/29/25 1400   Post-Procedure Width (cm) 0.5 cm 04/29/25 1400   Post-Procedure Depth (cm) 0.2 cm 04/29/25 1400   Post-Procedure Surface Area (cm^2) 0.65 cm^2 04/29/25 1400   Post-Procedure Volume (cm^3) 0.13 cm^3 04/29/25 1400   Wound Healing % 11 05/09/25 0839   Wound Bed Slough (%) 100 % 03/17/25 0800   Tunneling (cm) 0 cm 05/09/25 0839   Undermining (cm) 0 cm 05/09/25 0839   Undermining of Wound, 1st Location From 10 o'clock;To 2 o'clock 03/17/25 0800   Wound Odor None 05/09/25 0839   Pulses Left;DP;2+ 04/22/25 1400   Exposed Structures Other (Comments) 05/09/25 0839   Number of days: 53                                       PROCEDURE     Wound completed by RN, refer to note and flow sheet        PLAN:         Wound Care: Continue wound care  Imaging/Labs: No further imaging  Offloading: Avoid pressure over wound  Antibiotics: No further antibiotics  Surgery: No surgery.  However, if wound fails to progress or deteriorates, patient to be referred to Dr. Condon at [facility].        LPS Follow up: As needed      30 minutes was spent on preparation for visit, examination, counseling and coordination of care regarding the above.        Please note that this dictation was created using voice recognition software. I have  worked with technical experts from [facility] to optimize the interface.  I have made every reasonable attempt to correct obvious errors, but there may be errors of grammar and possibly content that I did not discover before finalizing the note.

## 2025-05-09 NOTE — PROGRESS NOTES
SNAP not placed today. Patient wound measurements were increasing with SNAP vac dressing. Patient has been approved for biologic, anticipate placement at next clinic visit.   Wound appears to be improving with Medihoney dressing. Instructed patient on dressing change procedure in case she has to change bandage prior to her appointment on Tuesday.

## 2025-05-09 NOTE — PATIENT INSTRUCTIONS
-Keep dressings clean and dry. Change dressings every 3-4 days, and if they become over saturated, soiled or fall off.     -On the days you are not changing your dressings, avoid getting the dressings wet. It is not harmful to get your wound wet when you are washing your wound before applying a new dressing.    -If you need to change your dressings at home: Wash your wound with normal saline, wound cleanser, or unscented soap and water prior to applying your new dressings. Please avoid cleansing with hydrogen peroxide or rubbing alcohol. Hydrogen peroxide and rubbing alcohol are toxic to new tissue and skin cells.    -Avoid prolonged standing or sitting without elevating your legs.    -Remove your compression garments if you have severe pain, severe swelling, numbness, color change, or temperature change in your toes. If you need to remove your compression garments, do so by unrolling them. Do not cut the compression garments off, this is to prevent cutting yourself on accident.    -Should you experience any significant changes in your wound(s), such as signs of infection (increasing redness, swelling, localized heat, increased pain, fever > 101 F, chills) or have any questions regarding your home care instructions, please contact the wound center at [phone]. If after hours, contact your primary care physician or go to the hospital emergency room.     -If you are admitted to any hospital, you will need a new referral to come back to the wound clinic and any scheduled appointments that you already have, may be cancelled.     -If you are 5 or more minutes late for an appointment, we reserve the right to cancel and reschedule that appointment. Additionally, if you are habitually late or not showing (3 late cancellations and/or no shows), we reserve the right to cancel your remaining appointments and it will be your responsibility to obtain a new referral if services are still needed.

## 2025-05-13 ENCOUNTER — OFFICE VISIT (OUTPATIENT)
Dept: WOUND CARE | Facility: MEDICAL CENTER | Age: 55
End: 2025-05-13
Attending: NURSE PRACTITIONER
Payer: COMMERCIAL

## 2025-05-13 DIAGNOSIS — T14.8XXA PAIN ASSOCIATED WITH WOUND: ICD-10-CM

## 2025-05-13 DIAGNOSIS — R52 PAIN ASSOCIATED WITH WOUND: ICD-10-CM

## 2025-05-13 DIAGNOSIS — L97.513 ULCER OF RIGHT FOOT WITH NECROSIS OF MUSCLE (HCC): ICD-10-CM

## 2025-05-13 PROCEDURE — 15275 SKIN SUB GRAFT FACE/NK/HF/G: CPT | Performed by: NURSE PRACTITIONER

## 2025-05-13 PROCEDURE — 15275 SKIN SUB GRAFT FACE/NK/HF/G: CPT

## 2025-05-13 NOTE — PROGRESS NOTES
Provider Encounter- Full Thickness wound    HISTORY OF PRESENT ILLNESS  Wound History:    START OF CARE IN CLINIC: 3/17/25    REFERRING PROVIDER: Sulma Mayberry MD     WOUND- Full Thickness Wound   LOCATION: Right dorsal third MTH     HISTORY: Patient initially noted a white dot to her right dorsal foot around October 2023.  It was not until she scratched the area and ulcer was noted around December 2024.  She denies any lesion formation.  Since then it has progressively worsened.  She followed up with her PCP who started her on triamcinolone.  She noticed some improvement.  She was referred to dermatology for further treatment and care.  She underwent punch biopsy on 1/7/25 of left medial calf and right dorsal foot showed consistent with morphea with sclerotic dermis and thinned epidermis.  negative for malignancy.  She continued with the Vaseline dressing.  Around 2/24/2025 she noticed worsening pain, swelling, purulence and erythema.  She was started on Keflex for 1 week followed by prednisone for 12 days which she completed.  She has since started timolol applying it twice a day.  She denies that there was ever a raised lesion but always rather an ulceration.  Current dressing treatment consisting of timolol twice daily followed by Vaseline and a Band-Aid.  She is performing soaks of her feet with water and vinegar.  She has not had significant improvement and was referred to [facility] wound care clinic for further treatment and care.  Ulcer has caused her to have difficulty walking because of the pain she is walking on lateral aspect of her foot.      Pertinent Medical History: Dyslipidemia, hypertension, thyroidectomy, prediabetes    TOBACCO USE:  no , no alcohol use, no drug use   Works at [employer].  Standing on feet 8 hours a day still toed shoes.  Patient's problem list, allergies, and current medications reviewed and updated in Epic    Interval History:  3/17/2025: Initial wound evaluation,  initial provider Clinic visit with MAHENDRA Armijo, DELMA ROWLEY.  Pt denies fevers, chills, nausea, vomiting.  Accompanied by daughter to appointment.  X-ray third toe ordered rule out osteomyelitis.  Tendon exposed.  Hold timolol at this time.  Initiate Puracyn gel, Hydrofera Blue.  Consider snap VAC if no pathergy noted.      4/15/2025 : Clinic visit with LINDA Epperson FNP-BC, DELMA LAROSE.   Patient states she is feeling well.  Tolerating SNAP without any difficulty.  Her wound is showing some improvement, new granulation buds around the edges, area has decreased.    4/29/2025: Clinic visit with MAHENDRA Armijo CWON, CFCN.  Pt denies fevers, chills, nausea, vomiting.  Patient reports she has a $300 co-pay each visit and her insurance will stop end of July 2025.  Tendon remains exposed, area decreased slightly.  Add LPS rounds to discuss surgical options.  Authorization for cellular tissue product requested.    5/13/25: Clinic visit with MAHENDRA Armijo, DELMA ROLWEY.  Pt denies fevers, chills, nausea, vomiting.  Ulcer crusted.  Postdebridement tendon remains exposed does track proximally.  Appears to be new granulation.  Followed up at LPS rounds on 5/9/2025.  No plans for surgery as ulcer had improved.  Approved for EpiFix/epi cord.  First application applied today.      REVIEW OF SYSTEMS:   Unchanged from previous clinic visit on 4/29/2025, except as documented in interval history above    PHYSICAL EXAMINATION:   [facility] 06/19/2017     Physical Exam  Cardiovascular:      Pulses: Normal pulses.      Comments: +2 DP, +2 PT to right foot easily palpable  Pulmonary:      Effort: Pulmonary effort is normal.   Musculoskeletal:         General: Swelling and tenderness present.      Right lower leg: Edema present.      Left lower leg: Edema present.      Comments: Hemosiderin staining to right dorsal forefoot, faint hemosiderin staining to bilateral medial calf   Skin:      Comments: Right dorsal third MTH: Full-thickness.  Wound area unchanged.  New granulation tissue noted near edges.  Tendon remains exposed, no longer frayed however there is undermining proximally along tendon approximately 0.5 cm, tendon remains moist.  Minimal serosanguineous drainage, no odor.  No periwound erythema or induration.     Neurological:      General: No focal deficit present.      Mental Status: She is alert.         WOUND ASSESSMENT  Wound 03/17/25 Full Thickness Wound Foot Dorsal Right (Active)   Wound Image    05/13/25 1500   Site Assessment Yellow;Crusted 05/13/25 1500   Periwound Assessment Scar tissue 05/13/25 1500   Margins Unattached edges 05/13/25 1500   Closure None 04/29/25 1400   Drainage Amount Small 05/13/25 1500   Drainage Description Serous;Serosanguineous 05/13/25 1500   Treatments Cleansed;Topical Lidocaine;Provider debridement;Site care 05/13/25 1500   Wound Cleansing Normal Saline Irrigation 05/13/25 1500   Periwound Protectant Skin Protectant Wipes to Periwound 05/13/25 1500   Dressing Status Old drainage 04/01/25 1500   Dressing Changed Changed 04/29/25 1400   Dressing Cleansing/Solutions Normal Saline 05/13/25 1500   Dressing Options Biologic (Skin Substitute);Mepitel One;Steri-Strip;Dry Gauze;Nonadhesive Foam;Hypafix Tape;Tubigrip 05/13/25 1500   Dressing Change/Treatment Frequency Weekly, and As Needed 05/13/25 1500   Wound Team Following Weekly 04/29/25 1400   Non-staged Wound Description Full thickness 05/13/25 1500   Wound Length (cm) 0.7 cm 05/13/25 1500   Wound Width (cm) 0.2 cm 05/13/25 1500   Wound Depth (cm) 0.2 cm 05/13/25 1500   Wound Surface Area (cm^2) 0.14 cm^2 05/13/25 1500   Wound Volume (cm^3) 0.028 cm^3 05/13/25 1500   Post-Procedure Length (cm) 0.9 cm 05/13/25 1500   Post-Procedure Width (cm) 0.5 cm 05/13/25 1500   Post-Procedure Depth (cm) 0.4 cm 05/13/25 1500   Post-Procedure Surface Area (cm^2) 0.45 cm^2 05/13/25 1500   Post-Procedure Volume (cm^3) 0.18  cm^3 05/13/25 1500   Wound Healing % 69 05/13/25 1500   Wound Bed Slough (%) 100 % 03/17/25 0800   Tunneling (cm) 0 cm 05/13/25 1500   Undermining (cm) 0 cm 05/13/25 1500   Undermining of Wound, 1st Location From 10 o'clock;To 2 o'clock 03/17/25 0800   Wound Odor None 05/13/25 1500   Pulses Left;DP;2+ 04/22/25 1400   Exposed Structures Tendon 05/13/25 1500   Number of days: 57       PROCEDURE: Right dorsal third MTH  -2% viscous lidocaine applied topically to wound bed for approximately 5 minutes prior to debridement  -Curette used to debride wound bed and open wound edges.  Excisional debridement was performed to remove devitalized tissue until healthy, bleeding tissue was visualized.   Entire surface of wound including undermining, 0.95 cm² debrided.  Tissue debrided into the muscle/fascia layer  -Bleeding controlled with manual pressure.    -Wound care completed by wound RN, refer to flowsheet  -Patient tolerated the procedure well, without c/o pain or discomfort.       Cellular tissue product log    First application-5/13/2025:  Epicord 1 x 2 cm; reorder number [id number]; [id number]; expires: 2029-09-01        Pertinent Labs and Diagnostics:    Labs:     A1c:   Lab Results   Component Value Date/Time    HBA1C 5.8 (H) 12/23/2024 09:57 AM          IMAGING:   3/19/2025-x-ray of right third toe  IMPRESSION:     No cortical bone destruction or periosteal reaction.          VASCULAR STUDIES: None    LAST  WOUND CULTURE:  DATE :   Lab Results   Component Value Date/Time    CULTRSULT Light growth usual skin arelis. (A) 02/24/2025 03:50 PM    CULTRSULT Staphylococcus lugdunensis  Light growth   (A) 02/24/2025 03:50 PM    CULTRSULT Corynebacterium amycolatum  Heavy growth   (A) 02/24/2025 03:50 PM    CULTRSULT (A) 02/24/2025 03:50 PM     Anaerobic Gram positive cocci  Moderate growth  Peptoniphilus species  Organism identified by MALDI-TOF research use only library.           ASSESSMENT AND PLAN:     1. 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  Biopsy performed by dermatology, nonmalignant pathology.  - Wound area unchanged.  Tendon remains exposed, functional and moist.  There is some undermining proximally.  Slight tissue growth over tendon.  - Excisional debridement performed today.  Medically necessary to promote wound healing.  - Patient to return to clinic weekly for assessment, debridement, cellular tissue product placement  - SNAP VAC discontinued at LPS rounds secondary size of wound  - Continue timolol hold  - X-ray completed on 3/19, no evidence of OM  -Patient co-pay $300 each visit.  Reports her insurance will stop end of July 2025.  Wound has not made significant progress.  Tendon remains exposed.  Added to LPS rounds 5/9/2025 to discuss surgical options, possible I&D with closure versus flap procedure.  As wound improved, no plans for surgery.  If wound stalls, patient to follow-up with Dr. Condon at his office.    -Approved for 12 units only of EpiFix/epi cord.  - First application and 2 units used today.      Patient has no evidence of infection.  Wound has been present for several months with tendon exposure.  Wound is on the dorsal aspect of her foot.  Wearing shoe that does not interfere with the ulcer.    Wound care: Biologic (Skin Substitute);Mepitel One;Steri-Strip;Dry Gauze;Nonadhesive Foam;Hypafix Tape;Tubigrip      2.  Pain associated with wound    5/13/2025: Per patient less painful, though debridement is still uncomfortable   -2% viscous lidocaine applied topically to wound bed for approximately 5 minutes prior to debridement  -Patient tolerated procedure today with minimal complaints of discomfort.   - Denies any autoimmune disorders.  Possible concern for pyoderma gangrenosum.  Patient continues to tolerate debridement and ulcer has made progress with debridement.  Continue to monitor for worsening signs and  symptoms/pathergy        PATIENT EDUCATION  - Importance of adequate nutrition for wound healing  -Advised to go to ER for any increased redness, swelling, drainage, or odor, or if patient develops fever, chills, nausea or vomiting.           Please note that this note may have been created using voice recognition software. I have worked with technical experts from [facility] to optimize the interface.  I have made every reasonable attempt to correct obvious errors, but there may be errors of grammar and possibly content that I did not discover before finalizing the note.    N

## 2025-05-13 NOTE — PATIENT INSTRUCTIONS
"The following information is a summary of the education provided in the clinic today. This is not an exhaustive list of the education provided during your appointment.       DRESSING CHANGES    -Keep your wound dressing clean, dry, and intact. Change your dressing every 7 days AND/OR if it's, soiled, leaks, gets wet, or falls off.      -You may not shower with the dressing(s) on. Please do not take baths, or swim in the ocean, lakes, rivers, pools, or hot tubs.      -Wounds do not need to \"air out\" or \"breathe\". Gently dry your wound before placing a new dressing.     -After you get out of the shower, wash the wound a second time (with soap and water, wound cleanser, or saline). Gently dry the wound before you place a new dressing.       -If you need to change your dressings at home, you should wash your wound. Use normal saline, wound cleanser, hypochlorous acid, or unscented soap and water. Do not use hydrogen peroxide or rubbing alcohol to clean your wounds. Hydrogen peroxide and rubbing alcohol will damage new cells and tissue. Do not use betadine or iodine unless told to by your wound care team.    -Do not soak your wounds in epsom salt baths. This can worsen your wound(s) or delay wound healing. It can also lead to infection or maceration (tissue is too wet).     -If you do not have home health, the clinic will give you with leftover supplies from your appointment. We do not give out extra dressing supplies. We will order you supplies through a [employer] company. Your insurance may or may not pay for all these supplies. The company will reach out to you if insurance does not cover supplies. These supplies will be sent to your home within a few days. If you do have home health, they will provide wound care supplies.     -The dressings we use may change as your wound changes.       SKIN SUBSTITUTE  - Epicord skin substitute was applied today. Do not change the dressing for one week, unless it falls off, gets dirty, or " leaks through, as discussed during your appointment.       CLINIC INFORMATION  -The clinic's hours are Monday-Friday, 7:30 AM to 5:00 PM. We are closed most holidays and on weekends. If you leave us a message, please allow 24 hours for someone to return your call. If you have concerns or are having a medical emergency, call 911 or go to the hospital emergency room.     -You might not see the same nurse or provider every visit.     -If you notice any large changes in your wound(s), or signs of infection (redness, swelling, localized heat, increased pain, fever > 101 F, chills, nausea/vomiting) or have any questions about your home care instructions, please call the wound center at [phone]. If it's after hours, contact your primary care physician or go to the hospital emergency room. If you are admitted to any hospital, you will need a new referral to come back to the wound clinic. Any wound care appointments that you already have may be cancelled.    -If you are 5 or more minutes late for an appointment, we reserve the right to cancel and reschedule that appointment. For example, if your appointment is at 1:00 PM, and you arrive at 1:06 PM, you are more than five minutes late and might not be seen. If you are consistently late or not coming to your appointments (typically 3 late cancellations and/or no shows), we reserve the right to cancel your future appointments or discharge you from the clinic. It is then your responsibility to obtain a new referral if wound care is still needed.

## 2025-05-13 NOTE — LETTER
[facility]  WOUND CARE CENTER  [address] [place] [id number]  [phone]     May 13, 2025    Patient: Ines Soilz   YOB: 1970   Date of Visit: 5/13/2025       To Whom It May Concern:    Ines Soliz was seen and treated in our department on 5/13/2025.     Please excuse the patient from work for 4 weeks, secondary to non-healing foot wound .    Sincerely,     LINDA Tenorio

## 2025-05-19 RX ORDER — TIMOLOL MALEATE 5 MG/ML
SOLUTION/ DROPS OPHTHALMIC
Qty: 30 ML | Refills: 1 | Status: SHIPPED | OUTPATIENT
Start: 2025-05-19

## 2025-05-20 ENCOUNTER — APPOINTMENT (OUTPATIENT)
Dept: WOUND CARE | Facility: MEDICAL CENTER | Age: 55
End: 2025-05-20
Attending: NURSE PRACTITIONER
Payer: COMMERCIAL

## 2025-05-27 ENCOUNTER — OFFICE VISIT (OUTPATIENT)
Dept: WOUND CARE | Facility: MEDICAL CENTER | Age: 55
End: 2025-05-27
Attending: NURSE PRACTITIONER
Payer: COMMERCIAL

## 2025-05-27 DIAGNOSIS — T14.8XXA PAIN ASSOCIATED WITH WOUND: ICD-10-CM

## 2025-05-27 DIAGNOSIS — R52 PAIN ASSOCIATED WITH WOUND: ICD-10-CM

## 2025-05-27 DIAGNOSIS — L97.513 ULCER OF RIGHT FOOT WITH NECROSIS OF MUSCLE (HCC): Primary | ICD-10-CM

## 2025-05-27 PROCEDURE — 15275 SKIN SUB GRAFT FACE/NK/HF/G: CPT

## 2025-05-27 PROCEDURE — 15275 SKIN SUB GRAFT FACE/NK/HF/G: CPT | Performed by: STUDENT IN AN ORGANIZED HEALTH CARE EDUCATION/TRAINING PROGRAM

## 2025-05-27 PROCEDURE — 99213 OFFICE O/P EST LOW 20 MIN: CPT

## 2025-05-27 PROCEDURE — 99214 OFFICE O/P EST MOD 30 MIN: CPT

## 2025-05-27 NOTE — PROGRESS NOTES
Provider Encounter- Full Thickness wound    HISTORY OF PRESENT ILLNESS  Wound History:    START OF CARE IN CLINIC: 3/17/25    REFERRING PROVIDER: Sulma Mayberry MD     WOUND- Full Thickness Wound   LOCATION: Right dorsal third MTH     HISTORY: Patient initially noted a white dot to her right dorsal foot around October 2023.  It was not until she scratched the area and ulcer was noted around December 2024.  She denies any lesion formation.  Since then it has progressively worsened.  She followed up with her PCP who started her on triamcinolone.  She noticed some improvement.  She was referred to dermatology for further treatment and care.  She underwent punch biopsy on 1/7/25 of left medial calf and right dorsal foot showed consistent with morphea with sclerotic dermis and thinned epidermis.  negative for malignancy.  She continued with the Vaseline dressing.  Around 2/24/2025 she noticed worsening pain, swelling, purulence and erythema.  She was started on Keflex for 1 week followed by prednisone for 12 days which she completed.  She has since started timolol applying it twice a day.  She denies that there was ever a raised lesion but always rather an ulceration.  Current dressing treatment consisting of timolol twice daily followed by Vaseline and a Band-Aid.  She is performing soaks of her feet with water and vinegar.  She has not had significant improvement and was referred to [facility] wound care clinic for further treatment and care.  Ulcer has caused her to have difficulty walking because of the pain she is walking on lateral aspect of her foot.      Pertinent Medical History: Dyslipidemia, hypertension, thyroidectomy, prediabetes    TOBACCO USE:  no , no alcohol use, no drug use   Works at [employer].  Standing on feet 8 hours a day still toed shoes.  Patient's problem list, allergies, and current medications reviewed and updated in Epic    Interval History:  3/17/2025: Initial wound evaluation,  initial provider Clinic visit with MAHENDRA Armijo, DELMA ROWLEY.  Pt denies fevers, chills, nausea, vomiting.  Accompanied by daughter to appointment.  X-ray third toe ordered rule out osteomyelitis.  Tendon exposed.  Hold timolol at this time.  Initiate Puracyn gel, Hydrofera Blue.  Consider snap VAC if no pathergy noted.      4/15/2025 : Clinic visit with LINDA Epperson FNP-BC, DELMA LAROSE.   Patient states she is feeling well.  Tolerating SNAP without any difficulty.  Her wound is showing some improvement, new granulation buds around the edges, area has decreased.    4/29/2025: Clinic visit with MAHENDRA Armijo, DELMA ROWLEY.  Pt denies fevers, chills, nausea, vomiting.  Patient reports she has a $300 co-pay each visit and her insurance will stop end of July 2025.  Tendon remains exposed, area decreased slightly.  Add LPS rounds to discuss surgical options.  Authorization for cellular tissue product requested.    5/13/25: Clinic visit with MAHENDRA Armijo, DELMA ROWLEY.  Pt denies fevers, chills, nausea, vomiting.  Ulcer crusted.  Postdebridement tendon remains exposed does track proximally.  Appears to be new granulation.  Followed up at LPS rounds on 5/9/2025.  No plans for surgery as ulcer had improved.  Approved for EpiFix/epi cord.  First application applied today.    5/27/2025: Clinic visit with Wilberto Higginbotham MD. Patient reports doing ok, denies any acute issues. Reports pain slowly improving. Due to pain, unable to work and brought [facility] paperwork to fill out. Wound smaller, second application of cellular tissue product.    REVIEW OF SYSTEMS:   Unchanged from previous clinic visit on 5/13/2025, except as documented in interval history above    PHYSICAL EXAMINATION:   [facility] 06/19/2017     Physical Exam  Cardiovascular:      Pulses: Normal pulses.      Comments: +2 DP, +2 PT to right foot easily palpable  Pulmonary:      Effort: Pulmonary effort is normal.    Musculoskeletal:         General: Swelling and tenderness present.      Right lower leg: Edema present.      Left lower leg: Edema present.      Comments: Hemosiderin staining to right dorsal forefoot, faint hemosiderin staining to bilateral medial calf   Skin:     Comments: Right dorsal third MTH: Wound smaller, improved tissue quality. Appears to have tendon exposed, viable and less exposed.  Minimal serosanguineous drainage, no odor.  No periwound erythema or induration.     Neurological:      General: No focal deficit present.      Mental Status: She is alert.         WOUND ASSESSMENT  Wound 03/17/25 Full Thickness Wound Foot Dorsal Right (Active)   Wound Image    05/27/25 1531   Site Assessment Pink;Red;Yellow 05/27/25 1531   Periwound Assessment Scar tissue 05/27/25 1531   Margins Unattached edges 05/27/25 1531   Closure None 04/29/25 1400   Drainage Amount Small 05/27/25 1531   Drainage Description Serosanguineous 05/27/25 1531   Treatments Cleansed;Topical Lidocaine;Provider debridement;Site care 05/27/25 1531   ** Retired** Wound Cleansing Normal Saline Irrigation 05/13/25 1500   Wound Cleansing Normal Saline Irrigation 05/27/25 1531   Periwound Protectant No-sting Skin Prep;Skin Moisturizer 05/27/25 1531   Dressing Status Old drainage 04/01/25 1500   Dressing Changed Changed 04/29/25 1400   Dressing Cleansing/Solutions Normal Saline 05/27/25 1531   Dressing Options Biologic (Skin Substitute);Mepitel One;Steri-Strip;Nonadhesive Foam;Hypafix Tape;Elastic tubular bandage 05/27/25 1531   Dressing Change/Treatment Frequency Weekly, and As Needed 05/27/25 1531   Wound Team Following Weekly 04/29/25 1400   Non-staged Wound Description Full thickness 05/27/25 1531   Wound Length (cm) 0.4 cm 05/27/25 1531   Wound Width (cm) 0.3 cm 05/27/25 1531   Wound Depth (cm) 0.3 cm 05/27/25 1531   Wound Surface Area (cm^2) 0.09 cm^2 05/27/25 1531   Wound Volume (cm^3) 0.019 cm^3 05/27/25 1531   Post-Procedure Length (cm) 0.5  cm 05/27/25 1531   Post-Procedure Width (cm) 0.4 cm 05/27/25 1531   Post-Procedure Depth (cm) 0.3 cm 05/27/25 1531   Post-Procedure Surface Area (cm^2) 0.16 cm^2 05/27/25 1531   Post-Procedure Volume (cm^3) 0.031 cm^3 05/27/25 1531   Wound Healing % 79 05/27/25 1531   Wound Bed Slough (%) 100 % 03/17/25 0800   Tunneling (cm) 0 cm 05/27/25 1531   Undermining (cm) 0 cm 05/27/25 1531   Undermining of Wound, 1st Location From 10 o'clock;To 2 o'clock 03/17/25 0800   Wound Odor None 05/27/25 1531   Pulses Left;DP;2+ 04/22/25 1400   Exposed Structures Tendon 05/27/25 1531   Number of days: 71       PROCEDURE: Excisional debridement of right dorsal foot wound with application of cellular tissue product.  -2% viscous lidocaine applied topically to wound bed for approximately 5 minutes prior to debridement  -Curette used to debride wound bed.  Excisional debridement was performed to remove devitalized tissue until healthy, bleeding tissue was visualized.   Entire surface of wound,0.16 cm2 debrided.  Tissue debrided at deepest point to muscle / fascia layer.    -Bleeding controlled with manual pressure.  - Wound irrigated with saline  - EpiCord was rehydrated and trimmed to fit wound bed.  -Wound care completed by wound RN, refer to flowsheet  -Patient tolerated the procedure well, without c/o pain or discomfort.        Cellular tissue product log    First application-5/13/2025:  Epicord 1 x 2 cm; reorder number [id number]; [id number]; expires: 2029-09-01  Second application-5/27/2025:  Epicord 1 x 2 cm; reorder number [id number]; [id number]; expires: 2029-09-01        Pertinent Labs and Diagnostics:    Labs:     A1c:   Lab Results   Component Value Date/Time    HBA1C 5.8 (H) 12/23/2024 09:57 AM          IMAGING:   3/19/2025-x-ray of right third toe  IMPRESSION:     No cortical bone destruction or periosteal reaction.          VASCULAR STUDIES: None    LAST  WOUND CULTURE:  DATE :   Lab Results   Component Value  Date/Time    CULTRSULT Light growth usual skin arelis. (A) 02/24/2025 03:50 PM    CULTRSULT Staphylococcus lugdunensis  Light growth   (A) 02/24/2025 03:50 PM    CULTRSULT Corynebacterium amycolatum  Heavy growth   (A) 02/24/2025 03:50 PM    CULTRSULT (A) 02/24/2025 03:50 PM     Anaerobic Gram positive cocci  Moderate growth  Peptoniphilus species  Organism identified by MALDI-TOF research use only library.           ASSESSMENT AND PLAN:     1. 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  Biopsy performed by dermatology, nonmalignant pathology.  - Wound measuring smaller, tendon appears still exposed.  - Excisional debridement performed today.  Medically necessary to promote wound healing.  - SNAP VAC discontinued at LPS rounds secondary size of wound  - Continue timolol hold  - X-ray completed on 3/19, no evidence of OM  -Patient co-pay $300 each visit.  Reports her insurance will stop end of July 2025.  Wound has not made significant progress.  Tendon remains exposed.  Added to LPS rounds 5/9/2025 to discuss surgical options, possible I&D with closure versus flap procedure.  As wound improved, no plans for surgery.  If wound stalls, patient to follow-up with Dr. Condon at his office.  - Approved for 12 units only of EpiFix/epi cord. Second application today, has had 4 units total.  - Patient has no evidence of infection.  Wound has been present for several months with tendon exposure.  Wound is on the dorsal aspect of her foot.  Wearing shoe that does not interfere with the ulcer.    Wound care: Biologic (Skin Substitute);Mepitel One;Steri-Strip;Dry Gauze;Nonadhesive Foam;Hypafix Tape;Tubigrip    2.  Pain associated with wound    5/27/2025: Pain slowly improving   -2% viscous lidocaine applied topically to wound bed for approximately 5 minutes prior to debridement  - Patient tolerated procedure today with minimal complaints of  discomfort.   - Denies any autoimmune disorders.  Possible concern for pyoderma gangrenosum.  Patient continues to tolerate debridement and ulcer has made progress with debridement.  Continue to monitor for worsening signs and symptoms/pathergy      PATIENT EDUCATION  - Importance of adequate nutrition for wound healing  -Advised to go to ER for any increased redness, swelling, drainage, or odor, or if patient develops fever, chills, nausea or vomiting.       My total time spent caring for the patient on the day of the encounter was 20 minutes, reviewing history, assessment, counseling and education, and coordination of care including completing [place] paperwork.  This does not include time spent on separately billable procedures/tests.      Please note that this note may have been created using voice recognition software. I have worked with technical experts from [facility] to optimize the interface.  I have made every reasonable attempt to correct obvious errors, but there may be errors of grammar and possibly content that I did not discover before finalizing the note.    N

## 2025-05-27 NOTE — PATIENT INSTRUCTIONS
"The following information is a summary of the education provided in the clinic today. This is not an exhaustive list of the education provided during your appointment.       DRESSING CHANGES    Keep your wound dressing clean, dry, and intact. Change your outer dressing every 3-4 days AND/OR if the dressing becomes, soiled, leaks, gets wet, or falls off.      You may not shower with the dressing(s) off. Please do not take baths, or swim in the ocean, lakes, rivers, pools, or hot tubs.      Wounds do not need to \"air out\" or \"breathe\". Gently dry your wound before placing a new dressing.     After you get out of the shower, wash the wound a second time (with soap and water, wound cleanser, or saline). Gently dry the wound before you place a new dressing.       If you need to change your dressings at home, you should wash your wound. Use normal saline, wound cleanser, hypochlorous acid, or unscented soap and water. Do not use hydrogen peroxide or rubbing alcohol to clean your wounds. Hydrogen peroxide and rubbing alcohol will damage new cells and tissue. Do not use betadine or iodine unless told to by your wound care team.    Do not soak your wounds in epsom salt baths. This can worsen your wound(s) or delay wound healing. It can also lead to infection or maceration (tissue is too wet).     If you do not have home health, the clinic will give you with leftover supplies from your appointment. We do not give out extra dressing supplies. We will order you supplies through a [employer] company. Your insurance may or may not pay for all these supplies. The company will reach out to you if insurance does not cover supplies. These supplies will be sent to your home within a few days. If you do have home health, they will provide wound care supplies.     The dressings we use may change as your wound changes.       SKIN SUBSTITUTE  - Epicord skin substitute was applied today. Do not change the dressing for one week, unless it falls " off, gets dirty, or leaks through, as discussed during your appointment.       COMPRESSION   -Compression helps reduce swelling and helps wounds heal quicker. It is still important to elevate your feet several times daily to reduce swelling, even when you are wearing compression. It is important to wear compression every day, to help your wound heal, and to prevent new wounds from developing.      -Today, a compression sock was applied. If you have pain, extreme swelling, new numbness or tingling in your feet, or a change in the color or temperature of your feet, please remove the garments. You should wear your compression garments every day. You can take them off at night, but they should be put back on every morning before you begin walking around.         CLINIC INFORMATION  The clinic's hours are Monday-Friday, 7:30 AM to 5:00 PM. We are closed most holidays and on weekends. If you leave us a message, please allow 24 hours for someone to return your call. If you have concerns or are having a medical emergency, call 911 or go to the hospital emergency room.     You might not see the same nurse or provider every visit.     If you notice any large changes in your wound(s), or signs of infection (redness, swelling, localized heat, increased pain, fever > 101 F, chills, nausea/vomiting) or have any questions about your home care instructions, please call the wound center at [phone]. If it's after hours, contact your primary care physician or go to the hospital emergency room. If you are admitted to any hospital, you will need a new referral to come back to the wound clinic. Any wound care appointments that you already have may be cancelled.    If you are 5 or more minutes late for an appointment, we reserve the right to cancel and reschedule that appointment. For example, if your appointment is at 1:00 PM, and you arrive at 1:06 PM, you are more than five minutes late and might not be seen. If you are  consistently late or not coming to your appointments (typically 3 late cancellations and/or no shows), we reserve the right to cancel your future appointments or discharge you from the clinic. It is then your responsibility to obtain a new referral if wound care is still needed.

## 2025-05-28 NOTE — PROGRESS NOTES
ANDRES paperwork completed and faxed to [employer] at [phone] and scanned into Epic system. Phone call to patient to inform her this was completed and she can  the paperwork at her earliest convenience from the .

## 2025-06-03 ENCOUNTER — APPOINTMENT (OUTPATIENT)
Dept: WOUND CARE | Facility: MEDICAL CENTER | Age: 55
End: 2025-06-03
Attending: NURSE PRACTITIONER
Payer: COMMERCIAL

## 2025-06-10 ENCOUNTER — OFFICE VISIT (OUTPATIENT)
Dept: WOUND CARE | Facility: MEDICAL CENTER | Age: 55
End: 2025-06-10
Attending: NURSE PRACTITIONER
Payer: COMMERCIAL

## 2025-06-10 DIAGNOSIS — T14.8XXA PAIN ASSOCIATED WITH WOUND: ICD-10-CM

## 2025-06-10 DIAGNOSIS — L97.513 ULCER OF RIGHT FOOT WITH NECROSIS OF MUSCLE (HCC): Primary | ICD-10-CM

## 2025-06-10 DIAGNOSIS — R52 PAIN ASSOCIATED WITH WOUND: ICD-10-CM

## 2025-06-10 PROCEDURE — 15275 SKIN SUB GRAFT FACE/NK/HF/G: CPT

## 2025-06-10 PROCEDURE — 15275 SKIN SUB GRAFT FACE/NK/HF/G: CPT | Performed by: STUDENT IN AN ORGANIZED HEALTH CARE EDUCATION/TRAINING PROGRAM

## 2025-06-10 NOTE — PATIENT INSTRUCTIONS
Your healthcare provider applied Epifix skin substitute today. Do not change the mepitel and steri-strip dressings for two weeks. Keep the outer dressings clean, dry and covered while bathing.You may change the outer dressing if soiled, saturated, or dislodged.     Should you experience any significant changes in your wound, such as signs of infection (increasing redness, swelling, localized heat, increased pain, fever > 101 F, chills) or have any questions regarding your home care instructions, please contact the wound center at [phone]. If after hours, contact your primary care physician or go to the hospital emergency room.

## 2025-06-10 NOTE — PROGRESS NOTES
Provider Encounter- Full Thickness wound    HISTORY OF PRESENT ILLNESS  Wound History:    START OF CARE IN CLINIC: 3/17/25    REFERRING PROVIDER: Sulma Mayberry MD     WOUND- Full Thickness Wound   LOCATION: Right dorsal third MTH     HISTORY: Patient initially noted a white dot to her right dorsal foot around October 2023.  It was not until she scratched the area and ulcer was noted around December 2024.  She denies any lesion formation.  Since then it has progressively worsened.  She followed up with her PCP who started her on triamcinolone.  She noticed some improvement.  She was referred to dermatology for further treatment and care.  She underwent punch biopsy on 1/7/25 of left medial calf and right dorsal foot showed consistent with morphea with sclerotic dermis and thinned epidermis.  negative for malignancy.  She continued with the Vaseline dressing.  Around 2/24/2025 she noticed worsening pain, swelling, purulence and erythema.  She was started on Keflex for 1 week followed by prednisone for 12 days which she completed.  She has since started timolol applying it twice a day.  She denies that there was ever a raised lesion but always rather an ulceration.  Current dressing treatment consisting of timolol twice daily followed by Vaseline and a Band-Aid.  She is performing soaks of her feet with water and vinegar.  She has not had significant improvement and was referred to [facility] wound care clinic for further treatment and care.  Ulcer has caused her to have difficulty walking because of the pain she is walking on lateral aspect of her foot.      Pertinent Medical History: Dyslipidemia, hypertension, thyroidectomy, prediabetes    TOBACCO USE:  no , no alcohol use, no drug use   Works at [employer].  Standing on feet 8 hours a day still toed shoes.  Patient's problem list, allergies, and current medications reviewed and updated in Epic    Interval History:  3/17/2025: Initial wound evaluation,  initial provider Clinic visit with MAHENDRA Armijo, DELMA ROWLEY.  Pt denies fevers, chills, nausea, vomiting.  Accompanied by daughter to appointment.  X-ray third toe ordered rule out osteomyelitis.  Tendon exposed.  Hold timolol at this time.  Initiate Puracyn gel, Hydrofera Blue.  Consider snap VAC if no pathergy noted.      4/15/2025 : Clinic visit with LINDA Epperson FNP-BC, DELMA LAROSE.   Patient states she is feeling well.  Tolerating SNAP without any difficulty.  Her wound is showing some improvement, new granulation buds around the edges, area has decreased.    4/29/2025: Clinic visit with MAHENDRA Armijo CWON, CFCN.  Pt denies fevers, chills, nausea, vomiting.  Patient reports she has a $300 co-pay each visit and her insurance will stop end of July 2025.  Tendon remains exposed, area decreased slightly.  Add LPS rounds to discuss surgical options.  Authorization for cellular tissue product requested.    5/13/25: Clinic visit with MAHENDRA Armijo, DELMA ROWLEY.  Pt denies fevers, chills, nausea, vomiting.  Ulcer crusted.  Postdebridement tendon remains exposed does track proximally.  Appears to be new granulation.  Followed up at LPS rounds on 5/9/2025.  No plans for surgery as ulcer had improved.  Approved for EpiFix/epi cord.  First application applied today.    5/27/2025: Clinic visit with Wilberto Higginbotham MD. Patient reports doing ok, denies any acute issues. Reports pain slowly improving. Due to pain, unable to work and brought [facility] paperwork to fill out. Wound smaller, second application of cellular tissue product.    6/10/2025: Clinic visit with Wilberto Higginbotham MD. Patient reports doing well, denies any signs or symptoms of infection. Wound is smaller. She is pleased with progress of wounds. Apply third treatment of cellular tissue product.    REVIEW OF SYSTEMS:   Unchanged from previous clinic visit on 5/27/2025, except as documented in interval  history above    PHYSICAL EXAMINATION:   [facility] 06/19/2017     Physical Exam  Cardiovascular:      Pulses: Normal pulses.      Comments: +2 DP, +2 PT to right foot easily palpable  Pulmonary:      Effort: Pulmonary effort is normal.   Musculoskeletal:      Right lower leg: Edema present.      Left lower leg: Edema present.      Comments: Hemosiderin staining to right dorsal forefoot, faint hemosiderin staining to bilateral medial calf   Skin:     Comments: Right dorsal third MTH: Wound smaller, increased granulation tissue at base. Hard scab covering wound bed. No evidence of infection.   Neurological:      General: No focal deficit present.      Mental Status: She is alert.         WOUND ASSESSMENT  Wound 03/17/25 Full Thickness Wound Foot Dorsal Right (Active)   Wound Image    06/10/25 1545   Site Assessment Other (Comment) 06/10/25 1545   Periwound Assessment Scar tissue 06/10/25 1545   Margins Unattached edges 06/10/25 1545   Closure None 04/29/25 1400   Drainage Amount None 06/10/25 1545   Drainage Description Serosanguineous 05/27/25 1531   Treatments Cleansed;Topical Lidocaine;Provider debridement 06/10/25 1545   ** Retired** Wound Cleansing Normal Saline Irrigation 05/13/25 1500   Wound Cleansing Foam Cleanser/Washcloth;Normal Saline Irrigation 06/10/25 1545   Periwound Protectant No-sting Skin Prep 06/10/25 1545   Dressing Status Old drainage 04/01/25 1500   Dressing Changed Changed 06/10/25 1545   Dressing Cleansing/Solutions Normal Saline 06/10/25 1545   Dressing Options Biologic (Skin Substitute);Mepitel One;Steri-Strip;Nonadhesive Foam;Hypafix Tape;Elastic tubular bandage 06/10/25 1545   Dressing Change/Treatment Frequency Weekly, and As Needed 05/27/25 1531   Wound Team Following Weekly 04/29/25 1400   Non-staged Wound Description Full thickness 06/10/25 1545   Wound Length (cm) 0.4 cm 05/27/25 1531   Wound Width (cm) 0.3 cm 05/27/25 1531   Wound Depth (cm) 0.3 cm 05/27/25 1531   Wound Surface Area (cm^2)  0.09 cm^2 05/27/25 1531   Wound Volume (cm^3) 0.019 cm^3 05/27/25 1531   Post-Procedure Length (cm) 0.5 cm 06/10/25 1545   Post-Procedure Width (cm) 0.3 cm 06/10/25 1545   Post-Procedure Depth (cm) 0.2 cm 06/10/25 1545   Post-Procedure Surface Area (cm^2) 0.12 cm^2 06/10/25 1545   Post-Procedure Volume (cm^3) 0.016 cm^3 06/10/25 1545   Wound Healing % 79 05/27/25 1531   Wound Bed Slough (%) 100 % 03/17/25 0800   Tunneling (cm) 0 cm 06/10/25 1545   Undermining (cm) 0 cm 06/10/25 1545   Undermining of Wound, 1st Location From 10 o'clock;To 2 o'clock 03/17/25 0800   Wound Odor None 06/10/25 1545   Pulses DP;2+ 06/10/25 1545   Exposed Structures None 06/10/25 1545   Number of days: 85       PROCEDURE: Excisional debridement of right dorsal foot wound with application of cellular tissue product.  -2% viscous lidocaine applied topically to wound bed for approximately 5 minutes prior to debridement  -Curette used to debride wound bed.  Excisional debridement was performed to remove devitalized tissue until healthy, bleeding tissue was visualized.   Entire surface of wound, 0.12 cm2 debrided. Tissue debrided at deepest into subcutaneous tissue.  -Bleeding controlled with manual pressure.  - Wound irrigated with saline  - EpiFix was rehydrated and trimmed to fit wound bed.  -Wound care completed by wound RN, refer to flowsheet  -Patient tolerated the procedure well, without c/o pain or discomfort.        Cellular tissue product log    First application-5/13/2025:  Epicord 1 x 2 cm; reorder number [id number]; [id number]; expires: 2029-09-01  Second application-5/27/2025:  Epicord 1 x 2 cm; reorder number [id number]; [id number]; expires: 2029-09-01  Third application - 6/10/2025  EpiFfix 14mm disc; PRODUCT # [id number] ; expires: 2029-12-01          Pertinent Labs and Diagnostics:    Labs:     A1c:   Lab Results   Component Value Date/Time    HBA1C 5.8 (H) 12/23/2024 09:57 AM          IMAGING:   3/19/2025-x-ray  of right third toe  IMPRESSION:     No cortical bone destruction or periosteal reaction.          VASCULAR STUDIES: None    LAST  WOUND CULTURE:  DATE :   Lab Results   Component Value Date/Time    CULTRSULT Light growth usual skin arelis. (A) 02/24/2025 03:50 PM    CULTRSULT Staphylococcus lugdunensis  Light growth   (A) 02/24/2025 03:50 PM    CULTRSULT Corynebacterium amycolatum  Heavy growth   (A) 02/24/2025 03:50 PM    CULTRSULT (A) 02/24/2025 03:50 PM     Anaerobic Gram positive cocci  Moderate growth  Peptoniphilus species  Organism identified by MALDI-TOF research use only library.           ASSESSMENT AND PLAN:     1. 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  Biopsy performed by dermatology, nonmalignant pathology.  - Wound measuring smaller with improved tissue quality.  - Excisional debridement performed today.  Medically necessary to promote wound healing.  - SNAP VAC discontinued at LPS rounds secondary size of wound  - Continue timolol hold  - X-ray completed on 3/19, no evidence of OM  - Patient co-pay $300 each visit.  Reports her insurance will stop end of July 2025.    - If wound stalls, patient to follow-up with Dr. Condon at his office per [facility] recommendation.  - Approved for 12 units only of EpiFix/epi cord. Third application today, has had 6 units total.  - Patient has no evidence of infection.  Wound has been present for several months with tendon exposure.  Wound is on the dorsal aspect of her foot.  Wearing shoe that does not interfere with the ulcer.    Wound care: Biologic (Skin Substitute);Mepitel One;Steri-Strip;Dry Gauze;Nonadhesive Foam;Hypafix Tape;Tubigrip    2.  Pain associated with wound    6/10/2025: Pain has improved.   -2% viscous lidocaine applied topically to wound bed for approximately 5 minutes prior to debridement      PATIENT EDUCATION  - Importance of adequate nutrition for wound  healing  -Advised to go to ER for any increased redness, swelling, drainage, or odor, or if patient develops fever, chills, nausea or vomiting.     Please note that this note may have been created using voice recognition software. I have worked with technical experts from [facility] [employer] to optimize the interface.  I have made every reasonable attempt to correct obvious errors, but there may be errors of grammar and possibly content that I did not discover before finalizing the note.    N

## 2025-06-18 ENCOUNTER — OFFICE VISIT (OUTPATIENT)
Dept: MEDICAL GROUP | Facility: MEDICAL CENTER | Age: 55
End: 2025-06-18
Payer: COMMERCIAL

## 2025-06-18 VITALS
DIASTOLIC BLOOD PRESSURE: 80 MMHG | RESPIRATION RATE: 16 BRPM | BODY MASS INDEX: 28.17 KG/M2 | WEIGHT: 159 LBS | OXYGEN SATURATION: 98 % | TEMPERATURE: 97.8 F | HEART RATE: 68 BPM | SYSTOLIC BLOOD PRESSURE: 122 MMHG

## 2025-06-18 DIAGNOSIS — I10 ESSENTIAL HYPERTENSION: ICD-10-CM

## 2025-06-18 DIAGNOSIS — R79.1 HIGH PROTEIN C ACTIVITY LEVEL: ICD-10-CM

## 2025-06-18 DIAGNOSIS — R73.02 IMPAIRED GLUCOSE TOLERANCE: ICD-10-CM

## 2025-06-18 DIAGNOSIS — M79.3 LIPODERMATOSCLEROSIS: ICD-10-CM

## 2025-06-18 DIAGNOSIS — E89.0 HISTORY OF THYROIDECTOMY, SUBTOTAL: ICD-10-CM

## 2025-06-18 DIAGNOSIS — R73.03 PREDIABETES: ICD-10-CM

## 2025-06-18 DIAGNOSIS — Z86.2 HISTORY OF IRON DEFICIENCY ANEMIA: ICD-10-CM

## 2025-06-18 DIAGNOSIS — E78.5 DYSLIPIDEMIA: ICD-10-CM

## 2025-06-18 PROCEDURE — 3074F SYST BP LT 130 MM HG: CPT | Performed by: FAMILY MEDICINE

## 2025-06-18 PROCEDURE — 3079F DIAST BP 80-89 MM HG: CPT | Performed by: FAMILY MEDICINE

## 2025-06-18 PROCEDURE — 99214 OFFICE O/P EST MOD 30 MIN: CPT | Performed by: FAMILY MEDICINE

## 2025-06-18 RX ORDER — HYDROXYCHLOROQUINE SULFATE 200 MG/1
200 TABLET, FILM COATED ORAL DAILY
Qty: 90 TABLET | Refills: 3 | Status: SHIPPED | OUTPATIENT
Start: 2025-06-18 | End: 2025-06-26 | Stop reason: SDUPTHER

## 2025-06-18 ASSESSMENT — FIBROSIS 4 INDEX: FIB4 SCORE: 0.99

## 2025-06-18 ASSESSMENT — PATIENT HEALTH QUESTIONNAIRE - PHQ9: CLINICAL INTERPRETATION OF PHQ2 SCORE: 0

## 2025-06-18 NOTE — PROGRESS NOTES
Chief Complaint   Patient presents with    Prediabetes    Hypertension    Dyslipidemia       Subjective:     HPI:   Ines Soliz presents today with the followin. Essential hypertension  HTN - Chronic condition stable. Currently taking all meds as directed.   She is not taking baby aspirin daily.  She is on Trental  She is occasionally monitoring BP at home.  She feels the amlodipine has been very helpful.  Blood pressure here today is very good  Denies symptoms low BP: light-headed, tunnel-vision, unusual fatigue.   Denies symptoms high BP:pounding headache, visual changes, palpitations, flushed face.   Denies medicine side effects: unusual fatigue, slow heartbeat, foot/leg swelling, cough.  Follow-up lab orders discussed and placed.    2. Dyslipidemia  Patient denies chest pain, chest pressure, palpitations or exertional shortness of breath. Patient denies muscle aches or muscle weakness from the red yeast rice OTC medication. Patient is a never smoker. Patient takes 81 mg aspirin daily. Patient has no history of myocardial infarction, stroke or PVD.  Lab order discussed and placed.    3. Impaired glucose tolerance/Prediabetes  Patient continues to have mild fasting glucose elevation and A1c elevation.  Denies increased thirst or nocturia.  Is having a struggle to keep active as she is trying to heal an ulcer on her foot.  Follow-up lab orders discussed and placed.    4. History of thyroidectomy, subtotal  Patient has history of subtotal thyroidectomy.  She has not needed thyroid supplementation.  However, she needs a recheck.  Patient does have some increased fatigue but thinks that may just be adjusting to her medications.    5. History of iron deficiency anemia  Patient does have history of iron deficiency anemia.  Follow-up lab order discussed and placed.    6. Lipodermatosclerosis  Diagnosed with biopsy, see abnormal lab results also.  She is now on hydroxychloroquine.  I have rewritten that  so she can get 90 days at a time.  She is following up with wound care.  Will start at new facility in September/October hopefully.  She will need to see ophthalmology due to the retinal risk of hydroxychloroquine once she has been on the medication 3 to 6 months.    7. High protein C activity level  She is on Trental for this issue and for her occlusive disease.        Patient Active Problem List    Diagnosis Date Noted    High protein C activity level 06/18/2025    Lipodermatosclerosis 03/27/2025    Impaired glucose tolerance 01/02/2024    Prediabetes 01/02/2024    Dyslipidemia 01/02/2024    Status post partial hysterectomy with remaining cervical stump 09/22/2017    Essential hypertension 04/01/2012    History of iron deficiency anemia 12/05/2011    History of thyroidectomy, subtotal     Family history of ischemic heart disease        Current medicines (including changes today)  Current Medications[1]    Allergies[2]    ROS: As per HPI       Objective:     /80 (BP Location: Right arm, Patient Position: Sitting, BP Cuff Size: Adult)   Pulse 68   Temp 36.6 °C (97.8 °F) (Temporal)   Resp 16   Wt 72.1 kg (159 lb)   SpO2 98%  Body mass index is 28.17 kg/m².    Physical Exam:  Constitutional: Well-developed and well-nourished. Not diaphoretic. No distress. Lucid and fluent.  Skin: Skin is warm and dry. Has dark thick skin both loower legs and feet.  She also has continued ulceration and slow healing wound right foot and stage I ulcer left lower leg medially.  Head: Atraumatic without lesions.  Eyes: Conjunctivae and extraocular motions are normal. Pupils are equal, round, and reactive to light. No scleral icterus.   Ears:  External ears unremarkable.   Neck: Supple, trachea midline. No thyromegaly present. No cervical or supraclavicular lymphadenopathy. No JVD or carotid bruits appreciated  Cardiovascular: Regular rate and rhythm.  Normal S1, S2 without murmur appreciated.  Chest: Effort normal. Clear to  auscultation throughout. No adventitious sounds.   Abdomen: Soft, non tender, and without distention. Active bowel sounds in all four quadrants. No rebound, guarding, masses or hepatosplenomegaly.  Extremities: No cyanosis, clubbing, erythema, nor edema.  Patient has pigment changes and thickening of the skin both lower extremities.  She has pigment changes of the feet also.  There is an ulcer on the dorsum of her right foot.  This has improved.  Neurological: Alert and oriented x 3. No tremor.  Psychiatric:  Behavior, mood, and affect are appropriate.       Assessment and Plan:     54 y.o. female with the following issues:    1. Essential hypertension  Comp Metabolic Panel    CBC WITHOUT DIFFERENTIAL    Lipid Profile      2. Dyslipidemia  Comp Metabolic Panel    CBC WITHOUT DIFFERENTIAL    TSH    Lipid Profile      3. Impaired glucose tolerance  Comp Metabolic Panel    HEMOGLOBIN A1C      4. Prediabetes  Comp Metabolic Panel    HEMOGLOBIN A1C      5. History of thyroidectomy, subtotal  TSH      6. History of iron deficiency anemia  Comp Metabolic Panel    CBC WITHOUT DIFFERENTIAL      7. Lipodermatosclerosis  hydroxychloroquine (PLAQUENIL) 200 MG Tab      8. High protein C activity level          Pap smear due 11/2025.  Mammogram due 9/2025.      Followup: Return in about 4 months (around 10/18/2025), or if symptoms worsen or fail to improve.         [1]   Current Outpatient Medications   Medication Sig Dispense Refill    hydroxychloroquine (PLAQUENIL) 200 MG Tab Take 1 Tablet by mouth every day. 90 Tablet 3    [facility] ASPIRIN ADULT LOW DOSE 81 MG Chew Tab chewable tablet CHEW 1 TABLET BY MOUTH EVERY DAY 90 Tablet 2    pentoxifylline CR (TRENTAL) 400 MG CR tablet Take 1 Tablet by mouth 3 times a day with meals. 90 Tablet 3    amLODIPine (NORVASC) 5 MG Tab Take 1 Tablet by mouth every day. 90 Tablet 3    ferrous sulfate 325 (65 Fe) MG tablet Take  by mouth every day.      Red Yeast Rice Extract (RED YEAST RICE PO) Take   by mouth.       No current facility-administered medications for this visit.   [2] No Known Allergies

## 2025-06-19 ENCOUNTER — TELEPHONE (OUTPATIENT)
Dept: WOUND CARE | Facility: MEDICAL CENTER | Age: 55
End: 2025-06-19
Payer: COMMERCIAL

## 2025-06-19 NOTE — TELEPHONE ENCOUNTER
New [facility] paperwork completed and faxed to [email or web address] at [phone]. Phone call to patient to inform her that paperwork is available for  at the .

## 2025-06-24 ENCOUNTER — OFFICE VISIT (OUTPATIENT)
Dept: WOUND CARE | Facility: MEDICAL CENTER | Age: 55
End: 2025-06-24
Attending: NURSE PRACTITIONER
Payer: COMMERCIAL

## 2025-06-24 DIAGNOSIS — T14.8XXA PAIN ASSOCIATED WITH WOUND: ICD-10-CM

## 2025-06-24 DIAGNOSIS — R52 PAIN ASSOCIATED WITH WOUND: ICD-10-CM

## 2025-06-24 DIAGNOSIS — L97.513 ULCER OF RIGHT FOOT WITH NECROSIS OF MUSCLE (HCC): Primary | ICD-10-CM

## 2025-06-24 PROCEDURE — 11042 DBRDMT SUBQ TIS 1ST 20SQCM/<: CPT | Performed by: STUDENT IN AN ORGANIZED HEALTH CARE EDUCATION/TRAINING PROGRAM

## 2025-06-24 PROCEDURE — 99213 OFFICE O/P EST LOW 20 MIN: CPT

## 2025-06-24 PROCEDURE — 11042 DBRDMT SUBQ TIS 1ST 20SQCM/<: CPT

## 2025-06-24 NOTE — PROGRESS NOTES
Wound is close to resolved.  Pt to come back in 2 weeks unless wound totally healed then will call and cancel appt.      Pt instructed to gently wash foot with soap and water prior to applying new dressing.  Ok to shower.

## 2025-06-24 NOTE — PROGRESS NOTES
Findings are most consistent with an ileus. We have discussed doing a liquids only diet. Use the nausea medication and of the cramping medication for symptom control. If you develop vomiting and stop passing gas return to the emergency department for further evaluation.     Work-up today also seems to show a urinary tract infection so I would recommend completing the full 10-day course of antibiotics unless you are to return to the emergency department due to worsening symptoms Provider Encounter- Full Thickness wound    HISTORY OF PRESENT ILLNESS  Wound History:    START OF CARE IN CLINIC: 3/17/25    REFERRING PROVIDER: Sulma Mayberry MD     WOUND- Full Thickness Wound   LOCATION: Right dorsal third MTH     HISTORY: Patient initially noted a white dot to her right dorsal foot around October 2023.  It was not until she scratched the area and ulcer was noted around December 2024.  She denies any lesion formation.  Since then it has progressively worsened.  She followed up with her PCP who started her on triamcinolone.  She noticed some improvement.  She was referred to dermatology for further treatment and care.  She underwent punch biopsy on 1/7/25 of left medial calf and right dorsal foot showed consistent with morphea with sclerotic dermis and thinned epidermis.  negative for malignancy.  She continued with the Vaseline dressing.  Around 2/24/2025 she noticed worsening pain, swelling, purulence and erythema.  She was started on Keflex for 1 week followed by prednisone for 12 days which she completed.  She has since started timolol applying it twice a day.  She denies that there was ever a raised lesion but always rather an ulceration.  Current dressing treatment consisting of timolol twice daily followed by Vaseline and a Band-Aid.  She is performing soaks of her feet with water and vinegar.  She has not had significant improvement and was referred to [facility] wound care clinic for further treatment and care.  Ulcer has caused her to have difficulty walking because of the pain she is walking on lateral aspect of her foot.      Pertinent Medical History: Dyslipidemia, hypertension, thyroidectomy, prediabetes    TOBACCO USE:  no , no alcohol use, no drug use   Works at [employer].  Standing on feet 8 hours a day still toed shoes.  Patient's problem list, allergies, and current medications reviewed and updated in Epic    Interval History:  3/17/2025: Initial wound evaluation,  initial provider Clinic visit with MAHENDRA Armijo, DELMA ROWLEY.  Pt denies fevers, chills, nausea, vomiting.  Accompanied by daughter to appointment.  X-ray third toe ordered rule out osteomyelitis.  Tendon exposed.  Hold timolol at this time.  Initiate Puracyn gel, Hydrofera Blue.  Consider snap VAC if no pathergy noted.      4/15/2025 : Clinic visit with LINDA Epperson FNP-BC, DELMA LAROSE.   Patient states she is feeling well.  Tolerating SNAP without any difficulty.  Her wound is showing some improvement, new granulation buds around the edges, area has decreased.    4/29/2025: Clinic visit with MAHENDRA Armijo CWON, CFCN.  Pt denies fevers, chills, nausea, vomiting.  Patient reports she has a $300 co-pay each visit and her insurance will stop end of July 2025.  Tendon remains exposed, area decreased slightly.  Add LPS rounds to discuss surgical options.  Authorization for cellular tissue product requested.    5/13/25: Clinic visit with MAHENDRA Armijo, DELMA ROWLEY.  Pt denies fevers, chills, nausea, vomiting.  Ulcer crusted.  Postdebridement tendon remains exposed does track proximally.  Appears to be new granulation.  Followed up at LPS rounds on 5/9/2025.  No plans for surgery as ulcer had improved.  Approved for EpiFix/epi cord.  First application applied today.    5/27/2025: Clinic visit with Wilberto Higginbotham MD. Patient reports doing ok, denies any acute issues. Reports pain slowly improving. Due to pain, unable to work and brought [facility] paperwork to fill out. Wound smaller, second application of cellular tissue product.    6/10/2025: Clinic visit with Wilberto Higginbotham MD. Patient reports doing well, denies any signs or symptoms of infection. Wound is smaller. She is pleased with progress of wounds. Apply third treatment of cellular tissue product.    6/24/2025: Clinic visit with Wilberto Higginbotham MD. Patient reports doing well, denies any fevers or chills. No  pain. Wound is near resolution.    REVIEW OF SYSTEMS:   Unchanged from previous clinic visit on 6/10/2025, except as documented in interval history above    PHYSICAL EXAMINATION:   [facility] 06/19/2017     Physical Exam  Cardiovascular:      Pulses: Normal pulses.      Comments: +2 DP, +2 PT to right foot easily palpable  Pulmonary:      Effort: Pulmonary effort is normal.   Musculoskeletal:      Right lower leg: Edema present.      Left lower leg: Edema present.      Comments: Hemosiderin staining to right dorsal forefoot, faint hemosiderin staining to bilateral medial calf   Skin:     Comments: Right dorsal third MTH: Wound has improved and is near resolution under crusting and scab. No evidence of infection.   Neurological:      General: No focal deficit present.      Mental Status: She is alert.         WOUND ASSESSMENT  Wound 03/17/25 Full Thickness Wound Foot Dorsal Right (Active)   Wound Image    06/24/25 1500   Site Assessment Brown;Crusted 06/24/25 1500   Periwound Assessment Scar tissue 06/24/25 1500   Margins Unattached edges 06/24/25 1500   Closure None 04/29/25 1400   Drainage Amount Scant 06/24/25 1500   Drainage Description Serosanguineous 05/27/25 1531   Treatments Cleansed;Topical Lidocaine;Provider debridement 06/24/25 1500   ** Retired** Wound Cleansing Normal Saline Irrigation 05/13/25 1500   Wound Cleansing Hypochlorus Acid 06/24/25 1500   Periwound Protectant Skin Protectant Wipes to Periwound 06/24/25 1500   Dressing Status Old drainage 04/01/25 1500   Dressing Changed Changed 06/10/25 1545   Dressing Cleansing/Solutions Hypochlorous acid gel 06/24/25 1500   Dressing Options Hydrofera Blue Ready;Hypafix Tape;Elastic tubular bandage 06/24/25 1500   Dressing Change/Treatment Frequency Other (Comments) 06/24/25 1500   Wound Team Following Weekly 04/29/25 1400   Non-staged Wound Description Partial thickness 06/24/25 1500   Wound Length (cm) 0.4 cm 05/27/25 1531   Wound Width (cm) 0.3 cm 05/27/25 1531    Wound Depth (cm) 0.3 cm 05/27/25 1531   Wound Surface Area (cm^2) 0.09 cm^2 05/27/25 1531   Wound Volume (cm^3) 0.019 cm^3 05/27/25 1531   Post-Procedure Length (cm) 0.1 cm 06/24/25 1500   Post-Procedure Width (cm) 0.1 cm 06/24/25 1500   Post-Procedure Depth (cm) 0 cm 06/24/25 1500   Post-Procedure Surface Area (cm^2) 0.01 cm^2 06/24/25 1500   Post-Procedure Volume (cm^3) 0 cm^3 06/24/25 1500   Wound Healing % 79 05/27/25 1531   Wound Bed Slough (%) 100 % 03/17/25 0800   Tunneling (cm) 0 cm 06/24/25 1500   Undermining (cm) 0 cm 06/24/25 1500   Undermining of Wound, 1st Location From 10 o'clock;To 2 o'clock 03/17/25 0800   Wound Odor None 06/24/25 1500   Pulses DP;2+ 06/10/25 1545   Exposed Structures None 06/24/25 1500   Number of days: 99       PROCEDURE: Excisional debridement of right dorsal foot wound  -2% viscous lidocaine applied topically to wound bed for approximately 5 minutes prior to debridement  -Curette used to debride wound bed.  Excisional debridement was performed to remove devitalized tissue until healthy, bleeding tissue was visualized.   Entire surface of wound, 0.01 cm2 debrided.  Tissue debrided into the subcutaneous layer.    -Bleeding controlled with manual pressure.    -Wound care completed by wound RN, refer to flowsheet  -Patient tolerated the procedure well, without c/o pain or discomfort.    Cellular tissue product log    First application-5/13/2025:  Epicord 1 x 2 cm; reorder number [id number]; [id number]; expires: 2029-09-01  Second application-5/27/2025:  Epicord 1 x 2 cm; reorder number [id number]; [id number]; expires: 2029-09-01  Third application - 6/10/2025  EpiFfix 14mm disc; PRODUCT # [id number] ; expires: 2029-12-01      Pertinent Labs and Diagnostics:    Labs:     A1c:   Lab Results   Component Value Date/Time    HBA1C 5.8 (H) 12/23/2024 09:57 AM          IMAGING:   3/19/2025-x-ray of right third toe  IMPRESSION:     No cortical bone destruction or periosteal  reaction.          VASCULAR STUDIES: None    LAST  WOUND CULTURE:  DATE :   Lab Results   Component Value Date/Time    CULTRSULT Light growth usual skin arelis. (A) 02/24/2025 03:50 PM    CULTRSULT Staphylococcus lugdunensis  Light growth   (A) 02/24/2025 03:50 PM    CULTRSULT Corynebacterium amycolatum  Heavy growth   (A) 02/24/2025 03:50 PM    CULTRSULT (A) 02/24/2025 03:50 PM     Anaerobic Gram positive cocci  Moderate growth  Peptoniphilus species  Organism identified by MALDI-TOF research use only library.           ASSESSMENT AND PLAN:     1. 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  Biopsy performed by dermatology, nonmalignant pathology.  - Wound measuring smaller, near resolution.  - Excisional debridement performed today.  Medically necessary to promote wound healing.  - X-ray completed on 3/19, no evidence of OM  - If wound stalls, patient to follow-up with Dr. Condon at his office per LPS recommendation.  - Approved for 12 units only of EpiFix/epi cord. Has had 3 applications of product with 6 units total.  - Hold CTP today as wound is near resolution.  - Return to clinic in 2 weeks, however if wound resolves she may cancel appointment  - Recommend she purchase and wear compression socks.    Wound care: Puracyn gel, hydrofera blue ready, tape    2.  Pain associated with wound    6/24/2025: Pain has improved.   -2% viscous lidocaine applied topically to wound bed for approximately 5 minutes prior to debridement      PATIENT EDUCATION  - Importance of adequate nutrition for wound healing  -Advised to go to ER for any increased redness, swelling, drainage, or odor, or if patient develops fever, chills, nausea or vomiting.     Please note that this note may have been created using voice recognition software. I have worked with technical experts from [employer] to optimize the interface.  I have made every reasonable attempt  to correct obvious errors, but there may be errors of grammar and possibly content that I did not discover before finalizing the note.    N

## 2025-06-24 NOTE — PATIENT INSTRUCTIONS
-Keep your wound dressing clean, dry, and intact. Only change dressing if it's over saturated, soiled or falls off.     -Change your dressing if it becomes soiled, soaked, or falls off.    - Resolved wound be fragile for a few days, bathe and dry area gently, only ever regains a maximum of 80% of the tensile strength of the surrounding skin, remodeling of scar can continue for 6 months to a year. Contact PCP for a referral back her if any problems with area opening and draining again.    -Should you experience any significant changes in your wound(s), such as infection (redness, swelling, localized heat, increased pain, fever > 101 F, chills) or have any questions regarding your home care instructions, please contact the wound center at [phone]. If after hours, contact your primary care physician or go to the hospital emergency room.     If you are admitted to any hospital, you will need a new referral to come back to the wound clinic and any scheduled appointments that you already have, may be cancelled.

## 2025-06-26 ENCOUNTER — OFFICE VISIT (OUTPATIENT)
Dept: DERMATOLOGY | Facility: IMAGING CENTER | Age: 55
End: 2025-06-26
Payer: COMMERCIAL

## 2025-06-26 DIAGNOSIS — M79.3 LIPODERMATOSCLEROSIS: Primary | ICD-10-CM

## 2025-06-26 PROCEDURE — 99214 OFFICE O/P EST MOD 30 MIN: CPT | Performed by: STUDENT IN AN ORGANIZED HEALTH CARE EDUCATION/TRAINING PROGRAM

## 2025-06-26 RX ORDER — HYDROXYCHLOROQUINE SULFATE 200 MG/1
200 TABLET, FILM COATED ORAL DAILY
Qty: 90 TABLET | Refills: 1 | Status: SHIPPED | OUTPATIENT
Start: 2025-06-26

## 2025-06-26 RX ORDER — PENTOXIFYLLINE 400 MG/1
400 TABLET, EXTENDED RELEASE ORAL
Qty: 180 TABLET | Refills: 2 | Status: SHIPPED | OUTPATIENT
Start: 2025-06-26

## 2025-06-26 RX ORDER — ASPIRIN 81 MG/1
81 TABLET, CHEWABLE ORAL
Qty: 90 TABLET | Refills: 1 | Status: SHIPPED | OUTPATIENT
Start: 2025-06-26

## 2025-06-26 RX ORDER — CLOBETASOL PROPIONATE 0.5 MG/G
OINTMENT TOPICAL
Qty: 60 G | Refills: 1 | Status: SHIPPED | OUTPATIENT
Start: 2025-06-26

## 2025-06-26 NOTE — PROGRESS NOTES
"[facility] Note    Chief Complaint   Patient presents with    Follow-Up     Patient reports the spot on her leg is  and itches.        HPI:      Ines Soliz is a 54 y.o. female here for follow up of lipodermatosclerosis, complicated by dorsal foot full thickness ulceration/infection associated with prolonged healing, for which she is following with wound care.     Since last visit 2 months ago, she has continued on hydroxychloroquine 200 mg daily, trental 400 mg three times daily, as well as aspirin 81 mg daily. Also on calcium channel blocker (amlodipine) per PCP. She has continued to follow with wound care at [facility].     Today reports improvement in ulcer on foot, it has now fully closed. She has been discharged from wound care. She is tolerating medications well, no side effects.  Is having itching around old ulcer site on right foot. On left leg is getting tender and itchy - Is using topical clobetasol as needed.         History:     Established here 1/07, Reported history of skin changes which started years ago on medial ankles, top of right foot with hyperpigmentation, bound down plaques.  Slowly progressing over time.  Sometimes itchy. Previous treatments include topical triamcinolone ointment, didn't help much. Oral meds at the time include amlodipine only. Otherwise healthy.     -  Punch biopsy x2 of left shin and right dorsal foot showed \"consistent with morphea\" with sclerotic dermis and thinned epidermis. (Biopsies were shallow)    1/14/25: ILK 10 mg/cc to plaques on bilateral shins, right dorsal foot. Overall areas relatively inactive.     2/24/25: developed acute worsening ulceration on right dorsal foot with full thickness to tendon and surrounding violaceous undermined borders and significant edema and erythema of surrounding skin on dorsal right foot. Concern for SSTI as well as inflammatory cause like pyoderma gangrenosum, vs vasculitis or other vasculopathy.  " "She was treated with 14 day course of keflex with improvement in edema and erythema, then started on prednisone taper 12d prednisone taper 1 mg/kg started 3/03 with continued improvement.     3/27/25: started on \"CHAP\" therapy - ASA, pentoxifylline, hydroxychloroquine in case of component of vasculopathy contributing to non healing wound. https://pubmed.ncbi.nlm.nih.gov/81789284/        ROS: no fevers/chills. Other pertinent positives and negatives as above.     Meds/PMH/PSH/FamHx/Allergies: reviewed relevant to my specialty in the chart.        OBJECTIVE    Exam:    A focused skin exam was performed including the affected areas of the bilateral ankles and feet. Notable findings on exam today listed below and/or in assessment/plan.    - Bound down hyperpigmented plaques on bilateral medial ankles, left ankle with 2 subQ erythematous nodules   - dorsal foot with re-epithelialized skin   Labs:      Latest Reference Range & Units 02/25/25 11:35   INR 0.87 - 1.13  1.03   PT 12.0 - 14.6 sec 13.5   APTT 24.7 - 36.0 sec 34.1   Heparin Xa (LMWH) U/mL <0.1   Dilute Jose Luis Viper Venom Ag 28.0 - 48.0 sec 46.5   Lupus Anticoagulant  Not Present   Protein C Functional 83 - 168 % 186 (H)   Protein S-Functional 57 - 131 % 107   Antithrombin III, Functional 76 - 128 % 132 (H)   Anti-Cardiolipin Ab Iga <=11 APL <10   Anti-Cardiolipin Ab Igm <=12 MPL <10   Anti-Cariolipin Ab Igg <=14 GPL <10   Beta 2 Microglobulin 0.8 - 2.4 mg/L 1.7   C3 Complement 90 - 180 mg/dL 200 (H)   Complement C4 10 - 40 mg/dL 43 (H)   (H): Data is abnormally high      Foot xray 3/19/25: No cortical bone destruction or periosteal reaction.     ASSESSMENT, & PLAN      Full thickness ulceration on right dorsal foot, wonder about underlying livedoid vasculopathy/vasculitis vs PG vs other inflammatory cause - now has re-epithelialized     Lipodermatosclerosis     Hypercoagulable work up previously unremarkable (cryoglobulin, anti phospholipid syndrome Abs, " mildly elevated protein C with normal protein S/ antithrombin, PT/PTT). FRED/RF negative.     - appreciate wound care assistance, continue follow up, wound vac therapy   - continue trental 400 mg three times daily with meals. Discussed side effects. No history of bleeding disorders in herself or family.   - continue hydroxychloroquine 200 mg daily (<5mg/kg/daily). Discussed side effects. She has ophthalmologist (wears glasses), recommend at next visit to inform she is on this medication. She stated understanding.   - continue daily baby aspirin 81 mg   - compression, elevation of leg as possible       Follow up: as needed - she will call to schedule pending insurance, she notes her current job may be ending in August and will call to schedule once regains insurance or for worsening skin rashes         Sulma Mayberry MD   [facility]

## 2025-07-08 ENCOUNTER — APPOINTMENT (OUTPATIENT)
Dept: WOUND CARE | Facility: MEDICAL CENTER | Age: 55
End: 2025-07-08
Attending: NURSE PRACTITIONER
Payer: COMMERCIAL

## 2025-08-13 ENCOUNTER — HOSPITAL ENCOUNTER (OUTPATIENT)
Dept: LAB | Facility: MEDICAL CENTER | Age: 55
End: 2025-08-13
Attending: FAMILY MEDICINE
Payer: COMMERCIAL

## 2025-08-13 DIAGNOSIS — I10 ESSENTIAL HYPERTENSION: ICD-10-CM

## 2025-08-13 DIAGNOSIS — E89.0 HISTORY OF THYROIDECTOMY, SUBTOTAL: ICD-10-CM

## 2025-08-13 DIAGNOSIS — E78.5 DYSLIPIDEMIA: ICD-10-CM

## 2025-08-13 DIAGNOSIS — R73.02 IMPAIRED GLUCOSE TOLERANCE: ICD-10-CM

## 2025-08-13 DIAGNOSIS — Z86.2 HISTORY OF IRON DEFICIENCY ANEMIA: ICD-10-CM

## 2025-08-13 DIAGNOSIS — R73.03 PREDIABETES: ICD-10-CM

## 2025-08-13 LAB
ALBUMIN SERPL BCP-MCNC: 4.6 G/DL (ref 3.2–4.9)
ALBUMIN/GLOB SERPL: 1.5 G/DL
ALP SERPL-CCNC: 73 U/L (ref 30–99)
ALT SERPL-CCNC: 38 U/L (ref 2–50)
ANION GAP SERPL CALC-SCNC: 12 MMOL/L (ref 7–16)
AST SERPL-CCNC: 30 U/L (ref 12–45)
BILIRUB SERPL-MCNC: 0.4 MG/DL (ref 0.1–1.5)
BUN SERPL-MCNC: 11 MG/DL (ref 8–22)
CALCIUM ALBUM COR SERPL-MCNC: 8.9 MG/DL (ref 8.5–10.5)
CALCIUM SERPL-MCNC: 9.4 MG/DL (ref 8.5–10.5)
CHLORIDE SERPL-SCNC: 105 MMOL/L (ref 96–112)
CHOLEST SERPL-MCNC: 193 MG/DL (ref 100–199)
CO2 SERPL-SCNC: 25 MMOL/L (ref 20–33)
CREAT SERPL-MCNC: 0.67 MG/DL (ref 0.5–1.4)
ERYTHROCYTE [DISTWIDTH] IN BLOOD BY AUTOMATED COUNT: 38.2 FL (ref 35.9–50)
EST. AVERAGE GLUCOSE BLD GHB EST-MCNC: 117 MG/DL
GFR SERPLBLD CREATININE-BSD FMLA CKD-EPI: 103 ML/MIN/1.73 M 2
GLOBULIN SER CALC-MCNC: 3 G/DL (ref 1.9–3.5)
GLUCOSE SERPL-MCNC: 106 MG/DL (ref 65–99)
HBA1C MFR BLD: 5.7 % (ref 4–5.6)
HCT VFR BLD AUTO: 46.8 % (ref 37–47)
HDLC SERPL-MCNC: 51 MG/DL
HGB BLD-MCNC: 15.8 G/DL (ref 12–16)
LDLC SERPL CALC-MCNC: 114 MG/DL
MCH RBC QN AUTO: 28.9 PG (ref 27–33)
MCHC RBC AUTO-ENTMCNC: 33.8 G/DL (ref 32.2–35.5)
MCV RBC AUTO: 85.6 FL (ref 81.4–97.8)
PLATELET # BLD AUTO: 290 K/UL (ref 164–446)
PMV BLD AUTO: 10.6 FL (ref 9–12.9)
POTASSIUM SERPL-SCNC: 4.1 MMOL/L (ref 3.6–5.5)
PROT SERPL-MCNC: 7.6 G/DL (ref 6–8.2)
RBC # BLD AUTO: 5.47 M/UL (ref 4.2–5.4)
SODIUM SERPL-SCNC: 142 MMOL/L (ref 135–145)
TRIGL SERPL-MCNC: 141 MG/DL (ref 0–149)
TSH SERPL-ACNC: 2.93 UIU/ML (ref 0.38–5.33)
WBC # BLD AUTO: 5.2 K/UL (ref 4.8–10.8)

## 2025-08-13 PROCEDURE — 83036 HEMOGLOBIN GLYCOSYLATED A1C: CPT

## 2025-08-13 PROCEDURE — 85027 COMPLETE CBC AUTOMATED: CPT

## 2025-08-13 PROCEDURE — 80061 LIPID PANEL: CPT

## 2025-08-13 PROCEDURE — 80053 COMPREHEN METABOLIC PANEL: CPT

## 2025-08-13 PROCEDURE — 36415 COLL VENOUS BLD VENIPUNCTURE: CPT

## 2025-08-13 PROCEDURE — 84443 ASSAY THYROID STIM HORMONE: CPT

## (undated) DEVICE — SET LEADWIRE 5 LEAD BEDSIDE DISPOSABLE ECG (1SET OF 5/EA)

## (undated) DEVICE — GLOVE BIOGEL SZ 6.5 SURGICAL PF LTX (50PR/BX 4BX/CA)

## (undated) DEVICE — CANISTER SUCTION 3000ML MECHANICAL FILTER AUTO SHUTOFF MEDI-VAC NONSTERILE LF DISP  (40EA/CA)

## (undated) DEVICE — LACTATED RINGERS INJ 1000 ML - (14EA/CA 60CA/PF)

## (undated) DEVICE — KIT ANESTHESIA W/CIRCUIT & 3/LT BAG W/FILTER (20EA/CA)

## (undated) DEVICE — SUTURE 0 VICRYL PLUS CT-1 - 36 INCH (36/BX)

## (undated) DEVICE — GOWN SURGEONS X-LARGE - DISP. (30/CA)

## (undated) DEVICE — PAD SANITARY 11IN MAXI IND WRAPPED  (12EA/PK 24PK/CA)

## (undated) DEVICE — GOWN WARMING STANDARD FLEX - (30/CA)

## (undated) DEVICE — SLEEVE, VASO, THIGH, MED

## (undated) DEVICE — BOVIE BLADE COATED &INSULATED - 25/PK

## (undated) DEVICE — SUTURE GENERAL

## (undated) DEVICE — SUTURE 1 VICRYL PLUS CT-1 - 18 INCH (12/BX)

## (undated) DEVICE — PACK MAJOR BASIN - (2EA/CA)

## (undated) DEVICE — NEPTUNE 4 PORT MANIFOLD - (20/PK)

## (undated) DEVICE — CATHETER IV 20 GA X 1-1/4 ---SURG.& SDS ONLY--- (50EA/BX)

## (undated) DEVICE — SPONGE GAUZE STER 4X4 8-PL - (2/PK 50PK/BX 12BX/CS)

## (undated) DEVICE — WATER IRRIGATION STERILE 1000ML (12EA/CA)

## (undated) DEVICE — SUTURE 2-0 VICRYL PLUS CT-1 36 (36PK/BX)"

## (undated) DEVICE — LEAD SET 6 DISP. EKG NIHON KOHDEN

## (undated) DEVICE — ELECTRODE 850 FOAM ADHESIVE - HYDROGEL RADIOTRNSPRNT (50/PK)

## (undated) DEVICE — PAD LAP STERILE 18 X 18 - (5/PK 40PK/CA)

## (undated) DEVICE — ELECTRODE DUAL RETURN W/ CORD - (50/PK)

## (undated) DEVICE — TUBE CONNECTING SUCTION - CLEAR PLASTIC STERILE 72 IN (50EA/CA)

## (undated) DEVICE — DRESSING NON-ADHERING 8 X 3 - (50/BX)

## (undated) DEVICE — SPONGE RADIOPAQUE CTN X-LG - STERILE (50PK/CA) MADE TO ORDER ITEM AND HAS A 4-6 WEEK LEAD TIME

## (undated) DEVICE — CANISTER SUCTION RIGID RED 1500CC (40EA/CA)

## (undated) DEVICE — KIT  I.V. START (100EA/CA)

## (undated) DEVICE — DRESSING TRANSPARENT FILM TEGADERM 4 X 4.75" (50EA/BX)"

## (undated) DEVICE — MASK ANESTHESIA ADULT  - (100/CA)

## (undated) DEVICE — SUTURE 0 COATED VICRYL 6-18IN - (12PK/BX)

## (undated) DEVICE — HEMOSTAT ARISTA PWD 3 GRAM - (5/CA)

## (undated) DEVICE — HEAD HOLDER JUNIOR/ADULT

## (undated) DEVICE — SODIUM CHL IRRIGATION 0.9% 1000ML (12EA/CA)

## (undated) DEVICE — TRAY BLADDER CARE W/ 16 FR FOLEY CATHETER STATLOCK  (10/CA)

## (undated) DEVICE — GOWN SURGEONS LARGE - (32/CA)

## (undated) DEVICE — SENSOR SPO2 NEO LNCS ADHESIVE (20/BX) SEE USER NOTES

## (undated) DEVICE — TUBING CLEARLINK DUO-VENT - C-FLO (48EA/CA)

## (undated) DEVICE — TUBE E-T HI-LO CUFF 7.0MM (10EA/PK)

## (undated) DEVICE — PROTECTOR ULNA NERVE - (36PR/CA)

## (undated) DEVICE — SUCTION INSTRUMENT YANKAUER BULBOUS TIP W/O VENT (50EA/CA)

## (undated) DEVICE — GLOVE BIOGEL INDICATOR SZ 6 SURGICAL PF LTX -(50/BX)

## (undated) DEVICE — TRAY SRGPRP PVP IOD WT PRP - (20/CA)